# Patient Record
Sex: MALE | Race: WHITE | Employment: OTHER | URBAN - METROPOLITAN AREA
[De-identification: names, ages, dates, MRNs, and addresses within clinical notes are randomized per-mention and may not be internally consistent; named-entity substitution may affect disease eponyms.]

---

## 2017-02-16 ENCOUNTER — OFFICE VISIT (OUTPATIENT)
Dept: INTERNAL MEDICINE CLINIC | Age: 72
End: 2017-02-16

## 2017-02-16 VITALS
DIASTOLIC BLOOD PRESSURE: 70 MMHG | SYSTOLIC BLOOD PRESSURE: 124 MMHG | OXYGEN SATURATION: 98 % | HEART RATE: 70 BPM | BODY MASS INDEX: 42.45 KG/M2 | WEIGHT: 286.6 LBS | HEIGHT: 69 IN | TEMPERATURE: 97.7 F | RESPIRATION RATE: 16 BRPM

## 2017-02-16 DIAGNOSIS — H26.9 CATARACT: Primary | ICD-10-CM

## 2017-02-16 DIAGNOSIS — I48.0 PAROXYSMAL ATRIAL FIBRILLATION (HCC): ICD-10-CM

## 2017-02-16 DIAGNOSIS — Z01.818 PRE-OP EXAM: ICD-10-CM

## 2017-02-16 DIAGNOSIS — I10 ESSENTIAL HYPERTENSION, BENIGN: ICD-10-CM

## 2017-02-16 PROCEDURE — 99214 OFFICE O/P EST MOD 30 MIN: CPT | Performed by: NURSE PRACTITIONER

## 2017-02-16 ASSESSMENT — ENCOUNTER SYMPTOMS
ABDOMINAL PAIN: 0
NAUSEA: 0
BLOOD IN STOOL: 0
CONSTIPATION: 0
WHEEZING: 0
VOMITING: 0
COUGH: 0
DIARRHEA: 1
SHORTNESS OF BREATH: 0

## 2017-03-09 ENCOUNTER — TELEPHONE (OUTPATIENT)
Dept: INTERNAL MEDICINE CLINIC | Age: 72
End: 2017-03-09

## 2017-03-09 RX ORDER — AZITHROMYCIN 250 MG/1
TABLET, FILM COATED ORAL
Qty: 1 PACKET | Refills: 0 | OUTPATIENT
Start: 2017-03-09 | End: 2017-03-19

## 2017-03-09 RX ORDER — PROMETHAZINE HYDROCHLORIDE AND CODEINE PHOSPHATE 6.25; 1 MG/5ML; MG/5ML
5 SYRUP ORAL EVERY 4 HOURS PRN
Qty: 120 ML | Refills: 1 | OUTPATIENT
Start: 2017-03-09 | End: 2017-03-16

## 2017-03-15 ASSESSMENT — ENCOUNTER SYMPTOMS
SHORTNESS OF BREATH: 0
ABDOMINAL PAIN: 0

## 2017-03-21 ENCOUNTER — OFFICE VISIT (OUTPATIENT)
Dept: INTERNAL MEDICINE CLINIC | Age: 72
End: 2017-03-21

## 2017-03-21 VITALS
DIASTOLIC BLOOD PRESSURE: 70 MMHG | WEIGHT: 284 LBS | RESPIRATION RATE: 16 BRPM | HEIGHT: 69 IN | HEART RATE: 76 BPM | SYSTOLIC BLOOD PRESSURE: 110 MMHG | BODY MASS INDEX: 42.06 KG/M2

## 2017-03-21 DIAGNOSIS — N40.0 BENIGN NON-NODULAR PROSTATIC HYPERPLASIA WITHOUT LOWER URINARY TRACT SYMPTOMS: ICD-10-CM

## 2017-03-21 DIAGNOSIS — I48.0 PAROXYSMAL ATRIAL FIBRILLATION (HCC): ICD-10-CM

## 2017-03-21 DIAGNOSIS — I10 ESSENTIAL HYPERTENSION, BENIGN: Primary | ICD-10-CM

## 2017-03-21 DIAGNOSIS — E78.00 PURE HYPERCHOLESTEROLEMIA: ICD-10-CM

## 2017-03-21 DIAGNOSIS — Z23 NEED FOR PNEUMOCOCCAL VACCINATION: ICD-10-CM

## 2017-03-21 PROCEDURE — G0009 ADMIN PNEUMOCOCCAL VACCINE: HCPCS | Performed by: INTERNAL MEDICINE

## 2017-03-21 PROCEDURE — 99214 OFFICE O/P EST MOD 30 MIN: CPT | Performed by: INTERNAL MEDICINE

## 2017-03-21 PROCEDURE — 90670 PCV13 VACCINE IM: CPT | Performed by: INTERNAL MEDICINE

## 2017-06-16 ASSESSMENT — ENCOUNTER SYMPTOMS
ABDOMINAL PAIN: 0
SHORTNESS OF BREATH: 0

## 2017-06-19 ENCOUNTER — OFFICE VISIT (OUTPATIENT)
Dept: INTERNAL MEDICINE CLINIC | Age: 72
End: 2017-06-19

## 2017-06-19 VITALS
WEIGHT: 287 LBS | BODY MASS INDEX: 42.51 KG/M2 | HEART RATE: 84 BPM | SYSTOLIC BLOOD PRESSURE: 122 MMHG | RESPIRATION RATE: 16 BRPM | HEIGHT: 69 IN | DIASTOLIC BLOOD PRESSURE: 60 MMHG

## 2017-06-19 DIAGNOSIS — M79.609 PAIN IN EXTREMITY, UNSPECIFIED EXTREMITY: Primary | ICD-10-CM

## 2017-06-19 DIAGNOSIS — M12.9 ARTHROPATHY: ICD-10-CM

## 2017-06-19 DIAGNOSIS — I10 ESSENTIAL HYPERTENSION, BENIGN: ICD-10-CM

## 2017-06-19 PROCEDURE — 99214 OFFICE O/P EST MOD 30 MIN: CPT | Performed by: INTERNAL MEDICINE

## 2017-06-19 RX ORDER — TADALAFIL 5 MG/1
5 TABLET ORAL PRN
Qty: 30 TABLET | Refills: 0 | COMMUNITY
Start: 2017-06-19 | End: 2019-10-21

## 2017-06-19 RX ORDER — HYDROCODONE BITARTRATE AND ACETAMINOPHEN 5; 325 MG/1; MG/1
1 TABLET ORAL EVERY 6 HOURS PRN
Qty: 20 TABLET | Refills: 0 | Status: SHIPPED | OUTPATIENT
Start: 2017-06-19 | End: 2017-06-26

## 2017-06-19 RX ORDER — METHYLPREDNISOLONE 4 MG/1
TABLET ORAL
Qty: 1 KIT | Refills: 1 | Status: SHIPPED | OUTPATIENT
Start: 2017-06-19 | End: 2017-06-25

## 2017-06-19 RX ORDER — SILDENAFIL 100 MG/1
100 TABLET, FILM COATED ORAL PRN
Qty: 2 TABLET | Refills: 0 | COMMUNITY
Start: 2017-06-19 | End: 2019-10-29

## 2017-11-14 ENCOUNTER — NURSE ONLY (OUTPATIENT)
Dept: INTERNAL MEDICINE CLINIC | Age: 72
End: 2017-11-14

## 2017-11-14 DIAGNOSIS — Z23 NEED FOR INFLUENZA VACCINATION: Primary | ICD-10-CM

## 2017-11-14 PROCEDURE — 90662 IIV NO PRSV INCREASED AG IM: CPT | Performed by: INTERNAL MEDICINE

## 2017-11-14 PROCEDURE — G0008 ADMIN INFLUENZA VIRUS VAC: HCPCS | Performed by: INTERNAL MEDICINE

## 2017-12-29 ASSESSMENT — ENCOUNTER SYMPTOMS
SHORTNESS OF BREATH: 0
ABDOMINAL PAIN: 0

## 2018-01-05 ENCOUNTER — OFFICE VISIT (OUTPATIENT)
Dept: INTERNAL MEDICINE CLINIC | Age: 73
End: 2018-01-05

## 2018-01-05 VITALS
SYSTOLIC BLOOD PRESSURE: 136 MMHG | HEIGHT: 69 IN | WEIGHT: 296 LBS | BODY MASS INDEX: 43.84 KG/M2 | HEART RATE: 76 BPM | RESPIRATION RATE: 16 BRPM | DIASTOLIC BLOOD PRESSURE: 64 MMHG

## 2018-01-05 DIAGNOSIS — E78.00 PURE HYPERCHOLESTEROLEMIA: ICD-10-CM

## 2018-01-05 DIAGNOSIS — N39.43 BENIGN PROSTATIC HYPERPLASIA WITH POST-VOID DRIBBLING: ICD-10-CM

## 2018-01-05 DIAGNOSIS — R07.2 PRECORDIAL PAIN: ICD-10-CM

## 2018-01-05 DIAGNOSIS — R60.9 EDEMA, UNSPECIFIED TYPE: ICD-10-CM

## 2018-01-05 DIAGNOSIS — I10 ESSENTIAL HYPERTENSION, BENIGN: Primary | ICD-10-CM

## 2018-01-05 DIAGNOSIS — N40.1 BENIGN PROSTATIC HYPERPLASIA WITH POST-VOID DRIBBLING: ICD-10-CM

## 2018-01-05 DIAGNOSIS — I48.0 PAROXYSMAL ATRIAL FIBRILLATION (HCC): ICD-10-CM

## 2018-01-05 DIAGNOSIS — F32.5 MAJOR DEPRESSION, SINGLE EPISODE, IN COMPLETE REMISSION (HCC): ICD-10-CM

## 2018-01-05 DIAGNOSIS — E66.01 MORBID OBESITY WITH BMI OF 40.0-44.9, ADULT (HCC): ICD-10-CM

## 2018-01-05 LAB
A/G RATIO: 1.7 (ref 1.1–2.2)
ALBUMIN SERPL-MCNC: 4.3 G/DL (ref 3.4–5)
ALP BLD-CCNC: 79 U/L (ref 40–129)
ALT SERPL-CCNC: 17 U/L (ref 10–40)
ANION GAP SERPL CALCULATED.3IONS-SCNC: 15 MMOL/L (ref 3–16)
AST SERPL-CCNC: 18 U/L (ref 15–37)
BILIRUB SERPL-MCNC: 0.4 MG/DL (ref 0–1)
BUN BLDV-MCNC: 21 MG/DL (ref 7–20)
CALCIUM SERPL-MCNC: 8.9 MG/DL (ref 8.3–10.6)
CHLORIDE BLD-SCNC: 101 MMOL/L (ref 99–110)
CHOLESTEROL, TOTAL: 218 MG/DL (ref 0–199)
CO2: 25 MMOL/L (ref 21–32)
CREAT SERPL-MCNC: 0.7 MG/DL (ref 0.8–1.3)
GFR AFRICAN AMERICAN: >60
GFR NON-AFRICAN AMERICAN: >60
GLOBULIN: 2.5 G/DL
GLUCOSE BLD-MCNC: 94 MG/DL (ref 70–99)
HDLC SERPL-MCNC: 48 MG/DL (ref 40–60)
LDL CHOLESTEROL CALCULATED: 136 MG/DL
POTASSIUM SERPL-SCNC: 4.2 MMOL/L (ref 3.5–5.1)
PROSTATE SPECIFIC ANTIGEN: 0.3 NG/ML (ref 0–4)
SODIUM BLD-SCNC: 141 MMOL/L (ref 136–145)
TOTAL PROTEIN: 6.8 G/DL (ref 6.4–8.2)
TRIGL SERPL-MCNC: 172 MG/DL (ref 0–150)
VLDLC SERPL CALC-MCNC: 34 MG/DL

## 2018-01-05 PROCEDURE — 99214 OFFICE O/P EST MOD 30 MIN: CPT | Performed by: INTERNAL MEDICINE

## 2018-01-05 PROCEDURE — 93000 ELECTROCARDIOGRAM COMPLETE: CPT | Performed by: INTERNAL MEDICINE

## 2018-01-05 RX ORDER — TAMSULOSIN HYDROCHLORIDE 0.4 MG/1
0.4 CAPSULE ORAL DAILY
Qty: 30 CAPSULE | Refills: 5 | Status: SHIPPED | OUTPATIENT
Start: 2018-01-05 | End: 2018-03-28 | Stop reason: SDUPTHER

## 2018-01-05 ASSESSMENT — ENCOUNTER SYMPTOMS
RESPIRATORY NEGATIVE: 1
GASTROINTESTINAL NEGATIVE: 1
EYES NEGATIVE: 1

## 2018-01-05 NOTE — PROGRESS NOTES
Patient advised
exhibits edema. decr flex-ext rt wrist with assoc pain    Neurological: He is alert and oriented to person, place, and time. He has normal reflexes. Skin: Skin is warm and dry. Psychiatric: He has a normal mood and affect. Assessment:      1. Essential hypertension, benign     2. Pure hypercholesterolemia     3. Edema, unspecified type     4. Precordial pain --ck ekg--doubt cardiac--at rst--lack of assoc sx     5.  Benign prostatic hyperplasia with post-void dribbling --ck psa --add flomax     rto 6mos --ext ov         Plan:

## 2018-03-27 PROBLEM — M79.674 PAIN OF TOE OF RIGHT FOOT: Status: ACTIVE | Noted: 2018-03-27

## 2018-03-27 ASSESSMENT — ENCOUNTER SYMPTOMS
SHORTNESS OF BREATH: 0
ABDOMINAL PAIN: 0

## 2018-03-27 NOTE — PROGRESS NOTES
Subjective:      Patient ID: Xavi Billings is a 67 y.o. male. HPI  1. Essential hypertension, benign --Stable--no new issues      2. Pain of toe of right foot --acute --?? Gout ==gr toe--no trauma has lost toenail--years ago--     3. Pure hypercholesterolemia --Stable--no new issues----lab noted and reviewed      On flomax-- it works --will rf--     Review of Systems   Respiratory: Negative for shortness of breath. Cardiovascular: Negative for chest pain. Gastrointestinal: Negative for abdominal pain. Objective:   Physical Exam   Eyes: Pupils are equal, round, and reactive to light. Neck: Normal range of motion. Cardiovascular: Normal rate and normal heart sounds. Pulmonary/Chest: Effort normal.   Abdominal: Soft. Obese    Musculoskeletal: He exhibits edema. Rt gr toe--tender at tip--part of nail regrown--?? Irritated  MTP jt--min tender --not red    Neurological: He is alert. Skin: Skin is warm. Assessment:      1. Essential hypertension, benign --Continue current therapy      2. Pain of toe of right foot --?? Gout vs nail issue--ck uric acd--rx meaybe--if now--see pod to remove irritting nail  URIC ACID   3.  Pure hypercholesterolemia --Continue current therapy      BPH--rf flomax   rto 4mos --reg visit         Plan:

## 2018-03-28 ENCOUNTER — OFFICE VISIT (OUTPATIENT)
Dept: INTERNAL MEDICINE CLINIC | Age: 73
End: 2018-03-28

## 2018-03-28 VITALS
BODY MASS INDEX: 43.1 KG/M2 | DIASTOLIC BLOOD PRESSURE: 64 MMHG | SYSTOLIC BLOOD PRESSURE: 138 MMHG | RESPIRATION RATE: 16 BRPM | HEIGHT: 69 IN | WEIGHT: 291 LBS | HEART RATE: 88 BPM

## 2018-03-28 DIAGNOSIS — I10 ESSENTIAL HYPERTENSION, BENIGN: Primary | ICD-10-CM

## 2018-03-28 DIAGNOSIS — M79.674 PAIN OF TOE OF RIGHT FOOT: ICD-10-CM

## 2018-03-28 DIAGNOSIS — E78.00 PURE HYPERCHOLESTEROLEMIA: ICD-10-CM

## 2018-03-28 LAB — URIC ACID, SERUM: 8.9 MG/DL (ref 3.5–7.2)

## 2018-03-28 PROCEDURE — 99213 OFFICE O/P EST LOW 20 MIN: CPT | Performed by: INTERNAL MEDICINE

## 2018-03-28 RX ORDER — TAMSULOSIN HYDROCHLORIDE 0.4 MG/1
0.4 CAPSULE ORAL DAILY
Qty: 90 CAPSULE | Refills: 3 | Status: SHIPPED | OUTPATIENT
Start: 2018-03-28 | End: 2018-12-11

## 2018-03-29 DIAGNOSIS — M79.674 PAIN OF TOE OF RIGHT FOOT: Primary | ICD-10-CM

## 2018-03-29 RX ORDER — COLCHICINE 0.6 MG/1
0.6 TABLET ORAL 2 TIMES DAILY PRN
Qty: 30 TABLET | Refills: 0 | Status: SHIPPED | OUTPATIENT
Start: 2018-03-29 | End: 2019-10-21

## 2018-03-29 RX ORDER — ALLOPURINOL 100 MG/1
100 TABLET ORAL DAILY
Qty: 30 TABLET | Refills: 5 | Status: SHIPPED | OUTPATIENT
Start: 2018-03-29 | End: 2019-02-03 | Stop reason: SDUPTHER

## 2018-03-29 NOTE — TELEPHONE ENCOUNTER
When I try to sign RX's it comes up with drug warning between Colchicine and Lovastatin. If okay please sign?

## 2018-07-16 ASSESSMENT — ENCOUNTER SYMPTOMS
SHORTNESS OF BREATH: 0
ABDOMINAL PAIN: 0

## 2018-07-16 NOTE — PROGRESS NOTES
Subjective:      Patient ID: Juan Boudreaux is a 67 y.o. male. HPI   Diagnosis Orders   1. Essential hypertension, benign --Stable--no new issues      2. Pure hypercholesterolemia --Stable--no new issues----lab noted and reviewed      3. Primary localized osteoarthrosis of lower leg, unspecified laterality --Stable--no new issues      No med changes --go to [facility] 3 x a week   Skin cancer---dr pozo   Will ret for labs  Info on shgrx --    Uses flomax--has stopped--recc resume--     Review of Systems   Respiratory: Negative for shortness of breath. Cardiovascular: Negative for chest pain. Gastrointestinal: Negative for abdominal pain. Objective:   Physical Exam   HENT:   Head: Normocephalic. Eyes: Pupils are equal, round, and reactive to light. Neck: Normal range of motion. Cardiovascular: Normal rate and normal heart sounds. Pulmonary/Chest: Effort normal.   Abdominal: Soft. Musculoskeletal: He exhibits edema. decr rom l/s spine    Neurological: He is alert. Skin: Skin is warm. Assessment:       Diagnosis Orders   1. Essential hypertension, benign --Continue current therapy      2. Pure hypercholesterolemia --Continue current therapy      3.  Primary localized osteoarthrosis of lower leg, unspecified laterality --Continue current therapy      Ret for labs--  rto 6mos ext ov          Plan:

## 2018-07-18 ENCOUNTER — OFFICE VISIT (OUTPATIENT)
Dept: INTERNAL MEDICINE CLINIC | Age: 73
End: 2018-07-18

## 2018-07-18 VITALS
OXYGEN SATURATION: 97 % | HEART RATE: 64 BPM | RESPIRATION RATE: 16 BRPM | HEIGHT: 69 IN | WEIGHT: 295 LBS | BODY MASS INDEX: 43.69 KG/M2 | DIASTOLIC BLOOD PRESSURE: 78 MMHG | SYSTOLIC BLOOD PRESSURE: 118 MMHG | TEMPERATURE: 97.5 F

## 2018-07-18 DIAGNOSIS — I10 ESSENTIAL HYPERTENSION, BENIGN: Primary | ICD-10-CM

## 2018-07-18 DIAGNOSIS — E78.00 PURE HYPERCHOLESTEROLEMIA: ICD-10-CM

## 2018-07-18 DIAGNOSIS — M17.10 PRIMARY LOCALIZED OSTEOARTHROSIS OF LOWER LEG, UNSPECIFIED LATERALITY: ICD-10-CM

## 2018-07-18 DIAGNOSIS — N40.0 BENIGN PROSTATIC HYPERPLASIA WITHOUT LOWER URINARY TRACT SYMPTOMS: ICD-10-CM

## 2018-07-18 PROCEDURE — 3288F FALL RISK ASSESSMENT DOCD: CPT | Performed by: INTERNAL MEDICINE

## 2018-07-18 PROCEDURE — 99214 OFFICE O/P EST MOD 30 MIN: CPT | Performed by: INTERNAL MEDICINE

## 2018-08-30 ENCOUNTER — TELEPHONE (OUTPATIENT)
Dept: INTERNAL MEDICINE CLINIC | Age: 73
End: 2018-08-30

## 2018-08-31 DIAGNOSIS — M79.674 PAIN OF TOE OF RIGHT FOOT: ICD-10-CM

## 2018-08-31 LAB — URIC ACID, SERUM: 9.1 MG/DL (ref 3.5–7.2)

## 2018-09-04 ENCOUNTER — TELEPHONE (OUTPATIENT)
Dept: INTERNAL MEDICINE CLINIC | Age: 73
End: 2018-09-04

## 2018-09-04 RX ORDER — ALLOPURINOL 300 MG/1
300 TABLET ORAL DAILY
Qty: 30 TABLET | Refills: 3 | Status: SHIPPED | OUTPATIENT
Start: 2018-09-04 | End: 2018-10-02 | Stop reason: SDUPTHER

## 2019-02-04 RX ORDER — ALLOPURINOL 100 MG/1
TABLET ORAL
Qty: 90 TABLET | Refills: 4 | Status: SHIPPED | OUTPATIENT
Start: 2019-02-04 | End: 2020-02-27

## 2019-07-31 PROBLEM — J06.9 URTI (ACUTE UPPER RESPIRATORY INFECTION): Status: ACTIVE | Noted: 2019-07-31

## 2019-08-19 ENCOUNTER — OFFICE VISIT (OUTPATIENT)
Dept: SLEEP MEDICINE | Age: 74
End: 2019-08-19
Payer: MEDICARE

## 2019-08-19 VITALS
SYSTOLIC BLOOD PRESSURE: 130 MMHG | BODY MASS INDEX: 44.14 KG/M2 | RESPIRATION RATE: 16 BRPM | TEMPERATURE: 97.8 F | DIASTOLIC BLOOD PRESSURE: 84 MMHG | HEART RATE: 64 BPM | WEIGHT: 298 LBS | HEIGHT: 69 IN | OXYGEN SATURATION: 97 %

## 2019-08-19 DIAGNOSIS — I48.0 PAROXYSMAL ATRIAL FIBRILLATION (HCC): ICD-10-CM

## 2019-08-19 DIAGNOSIS — G47.33 OSA TREATED WITH BIPAP: Primary | ICD-10-CM

## 2019-08-19 DIAGNOSIS — Z99.89 DEPENDENCE ON OTHER ENABLING MACHINES AND DEVICES: ICD-10-CM

## 2019-08-19 DIAGNOSIS — I10 ESSENTIAL HYPERTENSION, BENIGN: ICD-10-CM

## 2019-08-19 PROCEDURE — 99203 OFFICE O/P NEW LOW 30 MIN: CPT | Performed by: PSYCHIATRY & NEUROLOGY

## 2019-08-19 ASSESSMENT — SLEEP AND FATIGUE QUESTIONNAIRES
HOW LIKELY ARE YOU TO NOD OFF OR FALL ASLEEP IN A CAR, WHILE STOPPED FOR A FEW MINUTES IN TRAFFIC: 0
NECK CIRCUMFERENCE (INCHES): 19
ESS TOTAL SCORE: 1
HOW LIKELY ARE YOU TO NOD OFF OR FALL ASLEEP WHILE WATCHING TV: 1
HOW LIKELY ARE YOU TO NOD OFF OR FALL ASLEEP WHILE SITTING AND TALKING TO SOMEONE: 0
HOW LIKELY ARE YOU TO NOD OFF OR FALL ASLEEP WHILE SITTING INACTIVE IN A PUBLIC PLACE: 0
HOW LIKELY ARE YOU TO NOD OFF OR FALL ASLEEP WHILE SITTING QUIETLY AFTER LUNCH WITHOUT ALCOHOL: 0
HOW LIKELY ARE YOU TO NOD OFF OR FALL ASLEEP WHILE SITTING AND READING: 0
HOW LIKELY ARE YOU TO NOD OFF OR FALL ASLEEP WHILE LYING DOWN TO REST IN THE AFTERNOON WHEN CIRCUMSTANCES PERMIT: 0
HOW LIKELY ARE YOU TO NOD OFF OR FALL ASLEEP WHEN YOU ARE A PASSENGER IN A CAR FOR AN HOUR WITHOUT A BREAK: 0

## 2019-08-19 ASSESSMENT — ENCOUNTER SYMPTOMS
GASTROINTESTINAL NEGATIVE: 1
CHOKING: 0
EYES NEGATIVE: 1
ALLERGIC/IMMUNOLOGIC NEGATIVE: 1
APNEA: 0

## 2019-08-19 NOTE — PROGRESS NOTES
MD ROSIE England Board Certified in Sleep Medicine  Certified [facility]  Board Certified in Neurology [address]  [address]  [address] [place] [address] [place] [address], [address]           [address]  [address] [facility] [id number] [phone]    Subjective:     Patient ID: Rox Toth is a 75 y.o. male. Chief Complaint   Patient presents with    Sleep Apnea     follow up        HPI:        Rox Toth is a 75 y.o. male referred by Dr Angel Luciano for RENETTA management. Patient  was diagnosed with moderate obstructive sleep apnea about 4 years ago, currently of PAP machine at 17/13 CMWP, last sleep study was 4 years ago, last PAP titration about 4 years ago. Patient is using the PAP machine  in total average of 5;37 hours a night, more than 4 hours a night about 73 % in the last 90 days data . This patient has not gained since last PAP titration. Uses comfort Blue gel mask, but does not seal very well, still has EDS since he could not use the BiPAP longer time. SLEEP SCHEDULE: Goes to bed around 11 PM in theweekdays and 11 PM in the weekends. It usually takes the patient 10-30 minutes to fall asleep. The patient gets up 2-3 per night to go to the bathroom. The Patient finally gets up at 6:30 AM during the weekdays and 6:30 AM in the weekends. patient wakes up with dry mouth. The Patient scored Total score: 1 on [place] Sleepiness Scale ( more than 10 is indicative of daytime sleepiness)and 35 in fatigue scale ( more than 36 is indicativeof daytime fatigue). The patient takes dailynap for 30-60 minutes and usually is not refreshing nap. Previous evaluation and treatment has included- PSG with C PAP titration.  BiPAP titration 11/09/2015 at 17/13 cm, I do not have the PSG report, but in the Arthropathy    Pain in joint, lower leg    Otalgia    Abdominal pain    Hemorrhage of rectum and anus    Essential and other specified forms of tremor    Pain in limb    Edema    Psychosexual dysfunction with inhibited sexual excitement    Pure hypercholesterolemia    Depressive disorder, not elsewhere classified    Obesity    Essential hypertension, benign    Osteoarthritis    Status post total knee replacement    Low back pain    Chest pain    Otalgia    Paroxysmal atrial fibrillation (HCC)    Pain of toe of right foot    URTI (acute upper respiratory infection)       Past Medical History:   Diagnosis Date    Arthritis     CAD (coronary artery disease)     CPAP (continuous positive airway pressure) dependence     High blood pressure     Hyperlipidemia     Sleep apnea        Past Surgical History:   Procedure Laterality Date    COLONOSCOPY  02/12/2014    sm sessile polyp  hemorrhoids--kassi 2019---dr mora     HERNIA REPAIR      JOINT REPLACEMENT Bilateral     knees    KNEE ARTHROSCOPY  10.1994    LUMBAR DISC SURGERY      LUMBAR LAMINECTOMY  10/28/2016    dr dalal==[facility]     TOTAL KNEE ARTHROPLASTY Right 8/23/11    TKR on right knee    TOTAL KNEE ARTHROPLASTY Left 2/21/12    Lt TKR---dr Cherelle Aguillon        Family History   Problem Relation Age of Onset    Heart Disease Other     High Blood Pressure Other        Review of Systems   Constitutional: Positive for fatigue. HENT: Negative. Eyes: Negative. Respiratory: Negative for apnea and choking. Cardiovascular: Negative for leg swelling. Gastrointestinal: Negative. Genitourinary: Positive for frequency (2-3 times). Musculoskeletal: Positive for arthralgias and myalgias. Skin: Negative. Allergic/Immunologic: Negative. Neurological: Negative. Negative for headaches. Hematological: Negative. Psychiatric/Behavioral: Negative. All other systems reviewed and are negative.       Objective:     Vitals:  Weight BMI

## 2019-10-21 PROBLEM — K62.5 RECTAL BLEEDING: Status: ACTIVE | Noted: 2019-10-21

## 2019-10-24 ENCOUNTER — ANESTHESIA EVENT (OUTPATIENT)
Dept: ENDOSCOPY | Age: 74
End: 2019-10-24
Payer: MEDICARE

## 2019-10-25 ENCOUNTER — HOSPITAL ENCOUNTER (OUTPATIENT)
Age: 74
Setting detail: OUTPATIENT SURGERY
Discharge: HOME | End: 2019-10-25
Attending: INTERNAL MEDICINE | Admitting: INTERNAL MEDICINE
Payer: MEDICARE

## 2019-10-25 ENCOUNTER — ANESTHESIA (OUTPATIENT)
Dept: ENDOSCOPY | Age: 74
End: 2019-10-25
Payer: MEDICARE

## 2019-10-25 VITALS
RESPIRATION RATE: 16 BRPM | TEMPERATURE: 97.1 F | HEART RATE: 59 BPM | SYSTOLIC BLOOD PRESSURE: 141 MMHG | OXYGEN SATURATION: 96 % | DIASTOLIC BLOOD PRESSURE: 61 MMHG

## 2019-10-25 VITALS
SYSTOLIC BLOOD PRESSURE: 111 MMHG | RESPIRATION RATE: 14 BRPM | DIASTOLIC BLOOD PRESSURE: 56 MMHG | OXYGEN SATURATION: 97 %

## 2019-10-25 DIAGNOSIS — K62.5 RECTAL BLEEDING: ICD-10-CM

## 2019-10-25 PROCEDURE — 6360000002 HC RX W HCPCS

## 2019-10-25 PROCEDURE — 2580000003 HC RX 258: Performed by: ANESTHESIOLOGY

## 2019-10-25 PROCEDURE — 7100000011 HC PHASE II RECOVERY - ADDTL 15 MIN: Performed by: INTERNAL MEDICINE

## 2019-10-25 PROCEDURE — 88305 TISSUE EXAM BY PATHOLOGIST: CPT

## 2019-10-25 PROCEDURE — 2500000003 HC RX 250 WO HCPCS

## 2019-10-25 PROCEDURE — 3700000000 HC ANESTHESIA ATTENDED CARE: Performed by: INTERNAL MEDICINE

## 2019-10-25 PROCEDURE — 3609010300 HC COLONOSCOPY W/BIOPSY SINGLE/MULTIPLE: Performed by: INTERNAL MEDICINE

## 2019-10-25 PROCEDURE — 3700000001 HC ADD 15 MINUTES (ANESTHESIA): Performed by: INTERNAL MEDICINE

## 2019-10-25 PROCEDURE — 7100000010 HC PHASE II RECOVERY - FIRST 15 MIN: Performed by: INTERNAL MEDICINE

## 2019-10-25 PROCEDURE — 2709999900 HC NON-CHARGEABLE SUPPLY: Performed by: INTERNAL MEDICINE

## 2019-10-25 RX ORDER — ONDANSETRON 2 MG/ML
4 INJECTION INTRAMUSCULAR; INTRAVENOUS
Status: DISCONTINUED | OUTPATIENT
Start: 2019-10-25 | End: 2019-10-25 | Stop reason: HOSPADM

## 2019-10-25 RX ORDER — SODIUM CHLORIDE 0.9 % (FLUSH) 0.9 %
10 SYRINGE (ML) INJECTION PRN
Status: DISCONTINUED | OUTPATIENT
Start: 2019-10-25 | End: 2019-10-25 | Stop reason: HOSPADM

## 2019-10-25 RX ORDER — PROPOFOL 10 MG/ML
INJECTION, EMULSION INTRAVENOUS PRN
Status: DISCONTINUED | OUTPATIENT
Start: 2019-10-25 | End: 2019-10-25 | Stop reason: SDUPTHER

## 2019-10-25 RX ORDER — SODIUM CHLORIDE 9 MG/ML
INJECTION, SOLUTION INTRAVENOUS CONTINUOUS
Status: DISCONTINUED | OUTPATIENT
Start: 2019-10-25 | End: 2019-10-25 | Stop reason: HOSPADM

## 2019-10-25 RX ORDER — LIDOCAINE HYDROCHLORIDE 20 MG/ML
INJECTION, SOLUTION EPIDURAL; INFILTRATION; INTRACAUDAL; PERINEURAL PRN
Status: DISCONTINUED | OUTPATIENT
Start: 2019-10-25 | End: 2019-10-25 | Stop reason: SDUPTHER

## 2019-10-25 RX ORDER — SODIUM CHLORIDE 0.9 % (FLUSH) 0.9 %
10 SYRINGE (ML) INJECTION EVERY 12 HOURS SCHEDULED
Status: DISCONTINUED | OUTPATIENT
Start: 2019-10-25 | End: 2019-10-25 | Stop reason: HOSPADM

## 2019-10-25 RX ADMIN — SODIUM CHLORIDE: 0.9 INJECTION, SOLUTION INTRAVENOUS at 10:29

## 2019-10-25 RX ADMIN — PROPOFOL 30 MG: 10 INJECTION, EMULSION INTRAVENOUS at 10:39

## 2019-10-25 RX ADMIN — PROPOFOL 20 MG: 10 INJECTION, EMULSION INTRAVENOUS at 10:37

## 2019-10-25 RX ADMIN — PROPOFOL 20 MG: 10 INJECTION, EMULSION INTRAVENOUS at 10:43

## 2019-10-25 RX ADMIN — LIDOCAINE HYDROCHLORIDE 60 MG: 20 INJECTION, SOLUTION EPIDURAL; INFILTRATION; INTRACAUDAL; PERINEURAL at 10:35

## 2019-10-25 RX ADMIN — PROPOFOL 80 MG: 10 INJECTION, EMULSION INTRAVENOUS at 10:35

## 2019-10-25 RX ADMIN — PROPOFOL 30 MG: 10 INJECTION, EMULSION INTRAVENOUS at 10:41

## 2019-10-25 RX ADMIN — PROPOFOL 10 MG: 10 INJECTION, EMULSION INTRAVENOUS at 10:47

## 2019-10-25 RX ADMIN — PROPOFOL 30 MG: 10 INJECTION, EMULSION INTRAVENOUS at 10:45

## 2019-10-25 ASSESSMENT — PAIN - FUNCTIONAL ASSESSMENT: PAIN_FUNCTIONAL_ASSESSMENT: 0-10

## 2019-10-25 ASSESSMENT — PAIN SCALES - GENERAL
PAINLEVEL_OUTOF10: 0

## 2019-10-29 ENCOUNTER — PREP FOR PROCEDURE (OUTPATIENT)
Dept: SURGERY | Age: 74
End: 2019-10-29

## 2019-10-29 ENCOUNTER — OFFICE VISIT (OUTPATIENT)
Dept: SURGERY | Age: 74
End: 2019-10-29
Payer: MEDICARE

## 2019-10-29 VITALS
BODY MASS INDEX: 43.55 KG/M2 | HEIGHT: 69 IN | SYSTOLIC BLOOD PRESSURE: 146 MMHG | HEART RATE: 79 BPM | DIASTOLIC BLOOD PRESSURE: 66 MMHG | WEIGHT: 294 LBS

## 2019-10-29 DIAGNOSIS — E66.01 CLASS 3 SEVERE OBESITY DUE TO EXCESS CALORIES WITHOUT SERIOUS COMORBIDITY WITH BODY MASS INDEX (BMI) OF 40.0 TO 44.9 IN ADULT (HCC): ICD-10-CM

## 2019-10-29 DIAGNOSIS — I48.0 PAROXYSMAL ATRIAL FIBRILLATION (HCC): ICD-10-CM

## 2019-10-29 DIAGNOSIS — C18.2 MALIGNANT NEOPLASM OF ASCENDING COLON (HCC): ICD-10-CM

## 2019-10-29 DIAGNOSIS — K62.5 RECTAL BLEEDING: ICD-10-CM

## 2019-10-29 DIAGNOSIS — C18.2 MALIGNANT NEOPLASM OF ASCENDING COLON (HCC): Primary | ICD-10-CM

## 2019-10-29 LAB — CEA: 25.6 NG/ML (ref 0–5)

## 2019-10-29 PROCEDURE — 99205 OFFICE O/P NEW HI 60 MIN: CPT | Performed by: SURGERY

## 2019-10-29 RX ORDER — SODIUM CHLORIDE 0.9 % (FLUSH) 0.9 %
10 SYRINGE (ML) INJECTION PRN
Status: CANCELLED | OUTPATIENT
Start: 2019-10-29

## 2019-10-29 RX ORDER — CIPROFLOXACIN 2 MG/ML
400 INJECTION, SOLUTION INTRAVENOUS
Status: CANCELLED | OUTPATIENT
Start: 2019-10-29 | End: 2019-10-29

## 2019-10-29 RX ORDER — SODIUM CHLORIDE 0.9 % (FLUSH) 0.9 %
10 SYRINGE (ML) INJECTION EVERY 12 HOURS SCHEDULED
Status: CANCELLED | OUTPATIENT
Start: 2019-10-29

## 2019-11-04 ENCOUNTER — HOSPITAL ENCOUNTER (OUTPATIENT)
Dept: CT IMAGING | Age: 74
Discharge: HOME | End: 2019-11-04
Payer: MEDICARE

## 2019-11-04 DIAGNOSIS — C18.2 MALIGNANT NEOPLASM OF ASCENDING COLON (HCC): ICD-10-CM

## 2019-11-04 PROCEDURE — 6360000004 HC RX CONTRAST MEDICATION: Performed by: SURGERY

## 2019-11-04 PROCEDURE — 71260 CT THORAX DX C+: CPT

## 2019-11-04 RX ADMIN — IOHEXOL 50 ML: 240 INJECTION, SOLUTION INTRATHECAL; INTRAVASCULAR; INTRAVENOUS; ORAL at 12:07

## 2019-11-04 RX ADMIN — IOPAMIDOL 80 ML: 755 INJECTION, SOLUTION INTRAVENOUS at 12:08

## 2019-11-06 ENCOUNTER — TELEPHONE (OUTPATIENT)
Dept: SURGERY | Age: 74
End: 2019-11-06

## 2019-11-15 ENCOUNTER — OFFICE VISIT (OUTPATIENT)
Dept: SURGERY | Age: 74
End: 2019-11-15
Payer: MEDICARE

## 2019-11-15 VITALS
DIASTOLIC BLOOD PRESSURE: 62 MMHG | SYSTOLIC BLOOD PRESSURE: 124 MMHG | WEIGHT: 294 LBS | TEMPERATURE: 98.1 F | OXYGEN SATURATION: 97 % | HEART RATE: 71 BPM | BODY MASS INDEX: 43.55 KG/M2 | HEIGHT: 69 IN

## 2019-11-15 DIAGNOSIS — R16.0 LIVER MASS: ICD-10-CM

## 2019-11-15 DIAGNOSIS — C18.2 MALIGNANT NEOPLASM OF ASCENDING COLON (HCC): Primary | ICD-10-CM

## 2019-11-15 PROCEDURE — 99205 OFFICE O/P NEW HI 60 MIN: CPT | Performed by: SURGERY

## 2019-11-22 ENCOUNTER — TELEPHONE (OUTPATIENT)
Dept: SURGERY | Age: 74
End: 2019-11-22

## 2019-12-02 ENCOUNTER — TELEPHONE (OUTPATIENT)
Dept: SURGERY | Age: 74
End: 2019-12-02

## 2019-12-02 DIAGNOSIS — K76.9 LIVER LESION: ICD-10-CM

## 2019-12-02 DIAGNOSIS — C18.2 MALIGNANT NEOPLASM OF ASCENDING COLON (HCC): Primary | ICD-10-CM

## 2019-12-02 RX ORDER — NEOMYCIN SULFATE 500 MG/1
TABLET ORAL
Qty: 6 TABLET | Refills: 0 | Status: ON HOLD | OUTPATIENT
Start: 2019-12-02 | End: 2019-12-15 | Stop reason: HOSPADM

## 2019-12-02 RX ORDER — METRONIDAZOLE 500 MG/1
TABLET ORAL
Qty: 3 TABLET | Refills: 0 | Status: ON HOLD | OUTPATIENT
Start: 2019-12-02 | End: 2019-12-15 | Stop reason: HOSPADM

## 2019-12-03 ENCOUNTER — TELEPHONE (OUTPATIENT)
Dept: SURGERY | Age: 74
End: 2019-12-03

## 2019-12-05 PROBLEM — C18.2 MALIGNANT NEOPLASM OF ASCENDING COLON (HCC): Status: ACTIVE | Noted: 2019-12-05

## 2019-12-06 ENCOUNTER — HOSPITAL ENCOUNTER (OUTPATIENT)
Dept: MRI IMAGING | Age: 74
Discharge: HOME | End: 2019-12-06
Payer: MEDICARE

## 2019-12-06 DIAGNOSIS — C18.2 MALIGNANT NEOPLASM OF ASCENDING COLON (HCC): ICD-10-CM

## 2019-12-06 DIAGNOSIS — K76.9 LIVER LESION: ICD-10-CM

## 2019-12-06 PROCEDURE — A9581 GADOXETATE DISODIUM INJ: HCPCS | Performed by: SURGERY

## 2019-12-06 PROCEDURE — 74183 MRI ABD W/O CNTR FLWD CNTR: CPT

## 2019-12-06 PROCEDURE — 6360000004 HC RX CONTRAST MEDICATION: Performed by: SURGERY

## 2019-12-06 RX ADMIN — GADOXETATE DISODIUM 10 ML: 181.43 INJECTION, SOLUTION INTRAVENOUS at 09:05

## 2019-12-12 ENCOUNTER — HOSPITAL ENCOUNTER (OUTPATIENT)
Dept: PREADMISSION TESTING | Age: 74
Discharge: HOME | DRG: 330 | End: 2019-12-16
Payer: MEDICARE

## 2019-12-12 ENCOUNTER — ANESTHESIA EVENT (OUTPATIENT)
Dept: OPERATING ROOM | Age: 74
DRG: 330 | End: 2019-12-12
Payer: MEDICARE

## 2019-12-12 VITALS
SYSTOLIC BLOOD PRESSURE: 141 MMHG | HEIGHT: 69 IN | WEIGHT: 288 LBS | DIASTOLIC BLOOD PRESSURE: 65 MMHG | HEART RATE: 65 BPM | RESPIRATION RATE: 16 BRPM | BODY MASS INDEX: 42.65 KG/M2 | TEMPERATURE: 97 F | OXYGEN SATURATION: 96 %

## 2019-12-12 LAB
ABO/RH: NORMAL
ANION GAP SERPL CALCULATED.3IONS-SCNC: 13 MMOL/L (ref 3–16)
ANTIBODY SCREEN: NORMAL
ANTIBODY SCREEN: NORMAL
APTT: 26.8 SEC (ref 24.2–36.2)
BUN BLDV-MCNC: 20 MG/DL (ref 7–20)
CALCIUM SERPL-MCNC: 9.2 MG/DL (ref 8.3–10.6)
CHLORIDE BLD-SCNC: 103 MMOL/L (ref 99–110)
CO2: 25 MMOL/L (ref 21–32)
CREAT SERPL-MCNC: 0.8 MG/DL (ref 0.8–1.3)
GFR AFRICAN AMERICAN: >60
GFR NON-AFRICAN AMERICAN: >60
GLUCOSE BLD-MCNC: 95 MG/DL (ref 70–99)
HCT VFR BLD CALC: 37.1 % (ref 40.5–52.5)
HEMOGLOBIN: 11.6 G/DL (ref 13.5–17.5)
INR BLD: 1.09 (ref 0.86–1.14)
MCH RBC QN AUTO: 22 PG (ref 26–34)
MCHC RBC AUTO-ENTMCNC: 31.3 G/DL (ref 31–36)
MCV RBC AUTO: 70.2 FL (ref 80–100)
PDW BLD-RTO: 18.8 % (ref 12.4–15.4)
PLATELET # BLD: 355 K/UL (ref 135–450)
PMV BLD AUTO: 7.3 FL (ref 5–10.5)
POTASSIUM REFLEX MAGNESIUM: 4.2 MMOL/L (ref 3.5–5.1)
PROTHROMBIN TIME: 12.6 SEC (ref 10–13.2)
RBC # BLD: 5.28 M/UL (ref 4.2–5.9)
SODIUM BLD-SCNC: 141 MMOL/L (ref 136–145)
WBC # BLD: 9 K/UL (ref 4–11)

## 2019-12-12 PROCEDURE — 85027 COMPLETE CBC AUTOMATED: CPT

## 2019-12-12 PROCEDURE — 86850 RBC ANTIBODY SCREEN: CPT

## 2019-12-12 PROCEDURE — 86900 BLOOD TYPING SEROLOGIC ABO: CPT

## 2019-12-12 PROCEDURE — 85730 THROMBOPLASTIN TIME PARTIAL: CPT

## 2019-12-12 PROCEDURE — 80048 BASIC METABOLIC PNL TOTAL CA: CPT

## 2019-12-12 PROCEDURE — 85610 PROTHROMBIN TIME: CPT

## 2019-12-12 PROCEDURE — 86901 BLOOD TYPING SEROLOGIC RH(D): CPT

## 2019-12-13 ENCOUNTER — ANESTHESIA (OUTPATIENT)
Dept: OPERATING ROOM | Age: 74
DRG: 330 | End: 2019-12-13
Payer: MEDICARE

## 2019-12-13 ENCOUNTER — HOSPITAL ENCOUNTER (INPATIENT)
Age: 74
LOS: 2 days | Discharge: HOME | DRG: 330 | End: 2019-12-15
Attending: SURGERY | Admitting: SURGERY
Payer: MEDICARE

## 2019-12-13 VITALS — SYSTOLIC BLOOD PRESSURE: 147 MMHG | OXYGEN SATURATION: 97 % | TEMPERATURE: 98.4 F | DIASTOLIC BLOOD PRESSURE: 67 MMHG

## 2019-12-13 DIAGNOSIS — C18.2 CANCER OF ASCENDING COLON (HCC): Primary | ICD-10-CM

## 2019-12-13 PROBLEM — C18.9 COLON CANCER (HCC): Status: ACTIVE | Noted: 2019-12-13

## 2019-12-13 LAB
ABO/RH: NORMAL
ANTIBODY SCREEN: NORMAL

## 2019-12-13 PROCEDURE — 6360000002 HC RX W HCPCS: Performed by: SURGERY

## 2019-12-13 PROCEDURE — 2580000003 HC RX 258: Performed by: ANESTHESIOLOGY

## 2019-12-13 PROCEDURE — 2700000000 HC OXYGEN THERAPY PER DAY

## 2019-12-13 PROCEDURE — 7100000001 HC PACU RECOVERY - ADDTL 15 MIN: Performed by: SURGERY

## 2019-12-13 PROCEDURE — 2720000010 HC SURG SUPPLY STERILE: Performed by: SURGERY

## 2019-12-13 PROCEDURE — 6360000002 HC RX W HCPCS: Performed by: NURSE ANESTHETIST, CERTIFIED REGISTERED

## 2019-12-13 PROCEDURE — S2900 ROBOTIC SURGICAL SYSTEM: HCPCS | Performed by: SURGERY

## 2019-12-13 PROCEDURE — 2500000003 HC RX 250 WO HCPCS: Performed by: SURGERY

## 2019-12-13 PROCEDURE — 86900 BLOOD TYPING SEROLOGIC ABO: CPT

## 2019-12-13 PROCEDURE — 2780000010 HC IMPLANT OTHER: Performed by: SURGERY

## 2019-12-13 PROCEDURE — 0FJ04ZZ INSPECTION OF LIVER, PERCUTANEOUS ENDOSCOPIC APPROACH: ICD-10-PCS | Performed by: SURGERY

## 2019-12-13 PROCEDURE — 2500000003 HC RX 250 WO HCPCS: Performed by: NURSE ANESTHETIST, CERTIFIED REGISTERED

## 2019-12-13 PROCEDURE — 94761 N-INVAS EAR/PLS OXIMETRY MLT: CPT

## 2019-12-13 PROCEDURE — 94150 VITAL CAPACITY TEST: CPT

## 2019-12-13 PROCEDURE — 3700000000 HC ANESTHESIA ATTENDED CARE: Performed by: SURGERY

## 2019-12-13 PROCEDURE — 49320 DIAG LAPARO SEPARATE PROC: CPT | Performed by: SURGERY

## 2019-12-13 PROCEDURE — 44204 LAPARO PARTIAL COLECTOMY: CPT | Performed by: SURGERY

## 2019-12-13 PROCEDURE — 3600000009 HC SURGERY ROBOT BASE: Performed by: SURGERY

## 2019-12-13 PROCEDURE — 2580000003 HC RX 258: Performed by: SURGERY

## 2019-12-13 PROCEDURE — 0DTF4ZZ RESECTION OF RIGHT LARGE INTESTINE, PERCUTANEOUS ENDOSCOPIC APPROACH: ICD-10-PCS | Performed by: SURGERY

## 2019-12-13 PROCEDURE — 88309 TISSUE EXAM BY PATHOLOGIST: CPT

## 2019-12-13 PROCEDURE — 3700000001 HC ADD 15 MINUTES (ANESTHESIA): Performed by: SURGERY

## 2019-12-13 PROCEDURE — 6360000002 HC RX W HCPCS: Performed by: ANESTHESIOLOGY

## 2019-12-13 PROCEDURE — 7100000000 HC PACU RECOVERY - FIRST 15 MIN: Performed by: SURGERY

## 2019-12-13 PROCEDURE — 86850 RBC ANTIBODY SCREEN: CPT

## 2019-12-13 PROCEDURE — 76998 US GUIDE INTRAOP: CPT | Performed by: SURGERY

## 2019-12-13 PROCEDURE — 2580000003 HC RX 258: Performed by: STUDENT IN AN ORGANIZED HEALTH CARE EDUCATION/TRAINING PROGRAM

## 2019-12-13 PROCEDURE — 6370000000 HC RX 637 (ALT 250 FOR IP): Performed by: STUDENT IN AN ORGANIZED HEALTH CARE EDUCATION/TRAINING PROGRAM

## 2019-12-13 PROCEDURE — 2709999900 HC NON-CHARGEABLE SUPPLY: Performed by: SURGERY

## 2019-12-13 PROCEDURE — 1200000000 HC SEMI PRIVATE

## 2019-12-13 PROCEDURE — 86901 BLOOD TYPING SEROLOGIC RH(D): CPT

## 2019-12-13 PROCEDURE — 3600000019 HC SURGERY ROBOT ADDTL 15MIN: Performed by: SURGERY

## 2019-12-13 PROCEDURE — 2580000003 HC RX 258: Performed by: NURSE ANESTHETIST, CERTIFIED REGISTERED

## 2019-12-13 RX ORDER — ONDANSETRON 2 MG/ML
INJECTION INTRAMUSCULAR; INTRAVENOUS PRN
Status: DISCONTINUED | OUTPATIENT
Start: 2019-12-13 | End: 2019-12-13 | Stop reason: SDUPTHER

## 2019-12-13 RX ORDER — SODIUM CHLORIDE 0.9 % (FLUSH) 0.9 %
10 SYRINGE (ML) INJECTION PRN
Status: DISCONTINUED | OUTPATIENT
Start: 2019-12-13 | End: 2019-12-13 | Stop reason: HOSPADM

## 2019-12-13 RX ORDER — IBUPROFEN 400 MG/1
400 TABLET ORAL EVERY 6 HOURS
Status: DISCONTINUED | OUTPATIENT
Start: 2019-12-13 | End: 2019-12-15 | Stop reason: HOSPADM

## 2019-12-13 RX ORDER — GLYCOPYRROLATE 0.2 MG/ML
INJECTION INTRAMUSCULAR; INTRAVENOUS PRN
Status: DISCONTINUED | OUTPATIENT
Start: 2019-12-13 | End: 2019-12-13 | Stop reason: SDUPTHER

## 2019-12-13 RX ORDER — SODIUM CHLORIDE 0.9 % (FLUSH) 0.9 %
10 SYRINGE (ML) INJECTION EVERY 12 HOURS SCHEDULED
Status: DISCONTINUED | OUTPATIENT
Start: 2019-12-13 | End: 2019-12-13 | Stop reason: SDUPTHER

## 2019-12-13 RX ORDER — ONDANSETRON 2 MG/ML
4 INJECTION INTRAMUSCULAR; INTRAVENOUS
Status: DISCONTINUED | OUTPATIENT
Start: 2019-12-13 | End: 2019-12-13 | Stop reason: HOSPADM

## 2019-12-13 RX ORDER — HYDROMORPHONE HCL 110MG/55ML
PATIENT CONTROLLED ANALGESIA SYRINGE INTRAVENOUS PRN
Status: DISCONTINUED | OUTPATIENT
Start: 2019-12-13 | End: 2019-12-13 | Stop reason: SDUPTHER

## 2019-12-13 RX ORDER — FENTANYL CITRATE 50 UG/ML
25 INJECTION, SOLUTION INTRAMUSCULAR; INTRAVENOUS EVERY 5 MIN PRN
Status: COMPLETED | OUTPATIENT
Start: 2019-12-13 | End: 2019-12-13

## 2019-12-13 RX ORDER — PROMETHAZINE HYDROCHLORIDE 25 MG/ML
6.25 INJECTION, SOLUTION INTRAMUSCULAR; INTRAVENOUS
Status: DISCONTINUED | OUTPATIENT
Start: 2019-12-13 | End: 2019-12-13 | Stop reason: HOSPADM

## 2019-12-13 RX ORDER — SUCCINYLCHOLINE/SOD CL,ISO/PF 200MG/10ML
SYRINGE (ML) INTRAVENOUS PRN
Status: DISCONTINUED | OUTPATIENT
Start: 2019-12-13 | End: 2019-12-13 | Stop reason: SDUPTHER

## 2019-12-13 RX ORDER — ONDANSETRON 2 MG/ML
4 INJECTION INTRAMUSCULAR; INTRAVENOUS EVERY 6 HOURS PRN
Status: DISCONTINUED | OUTPATIENT
Start: 2019-12-13 | End: 2019-12-15 | Stop reason: HOSPADM

## 2019-12-13 RX ORDER — LIDOCAINE HYDROCHLORIDE 20 MG/ML
INJECTION, SOLUTION INTRAVENOUS PRN
Status: DISCONTINUED | OUTPATIENT
Start: 2019-12-13 | End: 2019-12-13 | Stop reason: SDUPTHER

## 2019-12-13 RX ORDER — FENTANYL CITRATE 50 UG/ML
INJECTION, SOLUTION INTRAMUSCULAR; INTRAVENOUS PRN
Status: DISCONTINUED | OUTPATIENT
Start: 2019-12-13 | End: 2019-12-13 | Stop reason: SDUPTHER

## 2019-12-13 RX ORDER — OXYCODONE HYDROCHLORIDE 5 MG/1
5 TABLET ORAL EVERY 4 HOURS PRN
Status: DISCONTINUED | OUTPATIENT
Start: 2019-12-13 | End: 2019-12-15 | Stop reason: HOSPADM

## 2019-12-13 RX ORDER — MAGNESIUM HYDROXIDE 1200 MG/15ML
LIQUID ORAL CONTINUOUS PRN
Status: COMPLETED | OUTPATIENT
Start: 2019-12-13 | End: 2019-12-13

## 2019-12-13 RX ORDER — CIPROFLOXACIN 2 MG/ML
400 INJECTION, SOLUTION INTRAVENOUS
Status: COMPLETED | OUTPATIENT
Start: 2019-12-13 | End: 2019-12-13

## 2019-12-13 RX ORDER — SODIUM CHLORIDE 0.9 % (FLUSH) 0.9 %
10 SYRINGE (ML) INJECTION EVERY 12 HOURS SCHEDULED
Status: DISCONTINUED | OUTPATIENT
Start: 2019-12-13 | End: 2019-12-15 | Stop reason: HOSPADM

## 2019-12-13 RX ORDER — EPHEDRINE SULFATE 50 MG/ML
INJECTION, SOLUTION INTRAVENOUS PRN
Status: DISCONTINUED | OUTPATIENT
Start: 2019-12-13 | End: 2019-12-13 | Stop reason: SDUPTHER

## 2019-12-13 RX ORDER — SODIUM CHLORIDE, SODIUM LACTATE, POTASSIUM CHLORIDE, CALCIUM CHLORIDE 600; 310; 30; 20 MG/100ML; MG/100ML; MG/100ML; MG/100ML
INJECTION, SOLUTION INTRAVENOUS CONTINUOUS
Status: DISCONTINUED | OUTPATIENT
Start: 2019-12-13 | End: 2019-12-14

## 2019-12-13 RX ORDER — HYDRALAZINE HYDROCHLORIDE 20 MG/ML
5 INJECTION INTRAMUSCULAR; INTRAVENOUS EVERY 10 MIN PRN
Status: DISCONTINUED | OUTPATIENT
Start: 2019-12-13 | End: 2019-12-13 | Stop reason: HOSPADM

## 2019-12-13 RX ORDER — NEOSTIGMINE METHYLSULFATE 5 MG/5 ML
SYRINGE (ML) INTRAVENOUS PRN
Status: DISCONTINUED | OUTPATIENT
Start: 2019-12-13 | End: 2019-12-13 | Stop reason: SDUPTHER

## 2019-12-13 RX ORDER — OXYCODONE HYDROCHLORIDE AND ACETAMINOPHEN 5; 325 MG/1; MG/1
1 TABLET ORAL
Status: DISCONTINUED | OUTPATIENT
Start: 2019-12-13 | End: 2019-12-13 | Stop reason: HOSPADM

## 2019-12-13 RX ORDER — LABETALOL 20 MG/4 ML (5 MG/ML) INTRAVENOUS SYRINGE
5 EVERY 10 MIN PRN
Status: DISCONTINUED | OUTPATIENT
Start: 2019-12-13 | End: 2019-12-13 | Stop reason: HOSPADM

## 2019-12-13 RX ORDER — ACETAMINOPHEN 500 MG
1000 TABLET ORAL EVERY 6 HOURS
Status: DISCONTINUED | OUTPATIENT
Start: 2019-12-13 | End: 2019-12-15 | Stop reason: HOSPADM

## 2019-12-13 RX ORDER — PROPOFOL 10 MG/ML
INJECTION, EMULSION INTRAVENOUS PRN
Status: DISCONTINUED | OUTPATIENT
Start: 2019-12-13 | End: 2019-12-13 | Stop reason: SDUPTHER

## 2019-12-13 RX ORDER — SODIUM CHLORIDE 0.9 % (FLUSH) 0.9 %
10 SYRINGE (ML) INJECTION PRN
Status: DISCONTINUED | OUTPATIENT
Start: 2019-12-13 | End: 2019-12-15 | Stop reason: HOSPADM

## 2019-12-13 RX ORDER — SODIUM CHLORIDE 9 MG/ML
INJECTION, SOLUTION INTRAVENOUS CONTINUOUS
Status: DISCONTINUED | OUTPATIENT
Start: 2019-12-13 | End: 2019-12-13

## 2019-12-13 RX ORDER — SODIUM CHLORIDE, SODIUM LACTATE, POTASSIUM CHLORIDE, CALCIUM CHLORIDE 600; 310; 30; 20 MG/100ML; MG/100ML; MG/100ML; MG/100ML
INJECTION, SOLUTION INTRAVENOUS CONTINUOUS
Status: DISCONTINUED | OUTPATIENT
Start: 2019-12-13 | End: 2019-12-13

## 2019-12-13 RX ORDER — SODIUM CHLORIDE 0.9 % (FLUSH) 0.9 %
10 SYRINGE (ML) INJECTION EVERY 12 HOURS SCHEDULED
Status: DISCONTINUED | OUTPATIENT
Start: 2019-12-13 | End: 2019-12-13 | Stop reason: HOSPADM

## 2019-12-13 RX ORDER — FENTANYL CITRATE 50 UG/ML
50 INJECTION, SOLUTION INTRAMUSCULAR; INTRAVENOUS EVERY 5 MIN PRN
Status: DISCONTINUED | OUTPATIENT
Start: 2019-12-13 | End: 2019-12-13 | Stop reason: HOSPADM

## 2019-12-13 RX ORDER — SODIUM CHLORIDE 9 MG/ML
INJECTION, SOLUTION INTRAVENOUS CONTINUOUS PRN
Status: DISCONTINUED | OUTPATIENT
Start: 2019-12-13 | End: 2019-12-13 | Stop reason: SDUPTHER

## 2019-12-13 RX ORDER — SODIUM CHLORIDE 0.9 % (FLUSH) 0.9 %
10 SYRINGE (ML) INJECTION PRN
Status: DISCONTINUED | OUTPATIENT
Start: 2019-12-13 | End: 2019-12-13 | Stop reason: SDUPTHER

## 2019-12-13 RX ORDER — ROCURONIUM BROMIDE 10 MG/ML
INJECTION, SOLUTION INTRAVENOUS PRN
Status: DISCONTINUED | OUTPATIENT
Start: 2019-12-13 | End: 2019-12-13 | Stop reason: SDUPTHER

## 2019-12-13 RX ADMIN — FENTANYL CITRATE 25 MCG: 50 INJECTION, SOLUTION INTRAMUSCULAR; INTRAVENOUS at 19:25

## 2019-12-13 RX ADMIN — HYDROMORPHONE HYDROCHLORIDE 0.5 MG: 2 INJECTION, SOLUTION INTRAMUSCULAR; INTRAVENOUS; SUBCUTANEOUS at 17:31

## 2019-12-13 RX ADMIN — METRONIDAZOLE 500 MG: 500 SOLUTION INTRAVENOUS at 14:55

## 2019-12-13 RX ADMIN — CIPROFLOXACIN 400 MG: 2 INJECTION, SOLUTION INTRAVENOUS at 14:55

## 2019-12-13 RX ADMIN — ENOXAPARIN SODIUM 40 MG: 40 INJECTION SUBCUTANEOUS at 11:25

## 2019-12-13 RX ADMIN — ROCURONIUM BROMIDE 10 MG: 10 INJECTION, SOLUTION INTRAVENOUS at 16:30

## 2019-12-13 RX ADMIN — HYDROMORPHONE HYDROCHLORIDE 0.5 MG: 2 INJECTION, SOLUTION INTRAMUSCULAR; INTRAVENOUS; SUBCUTANEOUS at 18:44

## 2019-12-13 RX ADMIN — FENTANYL CITRATE 50 MCG: 50 INJECTION INTRAMUSCULAR; INTRAVENOUS at 14:48

## 2019-12-13 RX ADMIN — ROCURONIUM BROMIDE 5 MG: 10 INJECTION, SOLUTION INTRAVENOUS at 14:51

## 2019-12-13 RX ADMIN — LIDOCAINE HYDROCHLORIDE 100 MG: 20 INJECTION, SOLUTION INTRAVENOUS at 14:51

## 2019-12-13 RX ADMIN — FENTANYL CITRATE 25 MCG: 50 INJECTION, SOLUTION INTRAMUSCULAR; INTRAVENOUS at 19:16

## 2019-12-13 RX ADMIN — Medication 160 MG: at 14:51

## 2019-12-13 RX ADMIN — PROPOFOL 200 MG: 10 INJECTION, EMULSION INTRAVENOUS at 14:51

## 2019-12-13 RX ADMIN — HYDROMORPHONE HYDROCHLORIDE 0.5 MG: 2 INJECTION, SOLUTION INTRAMUSCULAR; INTRAVENOUS; SUBCUTANEOUS at 15:40

## 2019-12-13 RX ADMIN — ACETAMINOPHEN 1000 MG: 500 TABLET ORAL at 21:31

## 2019-12-13 RX ADMIN — FENTANYL CITRATE 25 MCG: 50 INJECTION, SOLUTION INTRAMUSCULAR; INTRAVENOUS at 19:30

## 2019-12-13 RX ADMIN — FENTANYL CITRATE 25 MCG: 50 INJECTION, SOLUTION INTRAMUSCULAR; INTRAVENOUS at 19:50

## 2019-12-13 RX ADMIN — SODIUM CHLORIDE, POTASSIUM CHLORIDE, SODIUM LACTATE AND CALCIUM CHLORIDE 850 ML: 600; 310; 30; 20 INJECTION, SOLUTION INTRAVENOUS at 19:18

## 2019-12-13 RX ADMIN — ROCURONIUM BROMIDE 45 MG: 10 INJECTION, SOLUTION INTRAVENOUS at 15:02

## 2019-12-13 RX ADMIN — GLYCOPYRROLATE 0.8 MG: 0.2 INJECTION INTRAMUSCULAR; INTRAVENOUS at 18:27

## 2019-12-13 RX ADMIN — SODIUM CHLORIDE, SODIUM LACTATE, POTASSIUM CHLORIDE, AND CALCIUM CHLORIDE: 600; 310; 30; 20 INJECTION, SOLUTION INTRAVENOUS at 11:25

## 2019-12-13 RX ADMIN — SODIUM CHLORIDE, SODIUM LACTATE, POTASSIUM CHLORIDE, AND CALCIUM CHLORIDE: 600; 310; 30; 20 INJECTION, SOLUTION INTRAVENOUS at 14:45

## 2019-12-13 RX ADMIN — EPHEDRINE SULFATE 5 MG: 50 INJECTION, SOLUTION INTRAMUSCULAR; INTRAVENOUS; SUBCUTANEOUS at 15:26

## 2019-12-13 RX ADMIN — ROCURONIUM BROMIDE 20 MG: 10 INJECTION, SOLUTION INTRAVENOUS at 17:31

## 2019-12-13 RX ADMIN — FENTANYL CITRATE 50 MCG: 50 INJECTION INTRAMUSCULAR; INTRAVENOUS at 15:16

## 2019-12-13 RX ADMIN — GLYCOPYRROLATE 0.2 MG: 0.2 INJECTION INTRAMUSCULAR; INTRAVENOUS at 15:14

## 2019-12-13 RX ADMIN — ONDANSETRON 4 MG: 2 INJECTION INTRAMUSCULAR; INTRAVENOUS at 14:51

## 2019-12-13 RX ADMIN — SODIUM CHLORIDE, SODIUM LACTATE, POTASSIUM CHLORIDE, AND CALCIUM CHLORIDE: 600; 310; 30; 20 INJECTION, SOLUTION INTRAVENOUS at 18:34

## 2019-12-13 RX ADMIN — Medication 5 MG: at 18:27

## 2019-12-13 RX ADMIN — SODIUM CHLORIDE: 9 INJECTION, SOLUTION INTRAVENOUS at 14:55

## 2019-12-13 ASSESSMENT — PULMONARY FUNCTION TESTS
PIF_VALUE: 32
PIF_VALUE: 32
PIF_VALUE: 24
PIF_VALUE: 32
PIF_VALUE: 32
PIF_VALUE: 23
PIF_VALUE: 31
PIF_VALUE: 32
PIF_VALUE: 32
PIF_VALUE: 18
PIF_VALUE: 32
PIF_VALUE: 26
PIF_VALUE: 26
PIF_VALUE: 32
PIF_VALUE: 23
PIF_VALUE: 29
PIF_VALUE: 23
PIF_VALUE: 32
PIF_VALUE: 25
PIF_VALUE: 32
PIF_VALUE: 26
PIF_VALUE: 9
PIF_VALUE: 26
PIF_VALUE: 32
PIF_VALUE: 32
PIF_VALUE: 26
PIF_VALUE: 32
PIF_VALUE: 23
PIF_VALUE: 32
PIF_VALUE: 25
PIF_VALUE: 1
PIF_VALUE: 1
PIF_VALUE: 32
PIF_VALUE: 9
PIF_VALUE: 32
PIF_VALUE: 26
PIF_VALUE: 3
PIF_VALUE: 32
PIF_VALUE: 32
PIF_VALUE: 26
PIF_VALUE: 32
PIF_VALUE: 0
PIF_VALUE: 23
PIF_VALUE: 32
PIF_VALUE: 24
PIF_VALUE: 32
PIF_VALUE: 27
PIF_VALUE: 32
PIF_VALUE: 32
PIF_VALUE: 26
PIF_VALUE: 1
PIF_VALUE: 32
PIF_VALUE: 32
PIF_VALUE: 30
PIF_VALUE: 32
PIF_VALUE: 23
PIF_VALUE: 25
PIF_VALUE: 32
PIF_VALUE: 10
PIF_VALUE: 32
PIF_VALUE: 0
PIF_VALUE: 32
PIF_VALUE: 32
PIF_VALUE: 18
PIF_VALUE: 1
PIF_VALUE: 32
PIF_VALUE: 25
PIF_VALUE: 25
PIF_VALUE: 1
PIF_VALUE: 32
PIF_VALUE: 23
PIF_VALUE: 32
PIF_VALUE: 27
PIF_VALUE: 32
PIF_VALUE: 26
PIF_VALUE: 32
PIF_VALUE: 32
PIF_VALUE: 26
PIF_VALUE: 23
PIF_VALUE: 32
PIF_VALUE: 32
PIF_VALUE: 26
PIF_VALUE: 23
PIF_VALUE: 32
PIF_VALUE: 7
PIF_VALUE: 32
PIF_VALUE: 26
PIF_VALUE: 26
PIF_VALUE: 32
PIF_VALUE: 26
PIF_VALUE: 26
PIF_VALUE: 32
PIF_VALUE: 23
PIF_VALUE: 32
PIF_VALUE: 30
PIF_VALUE: 32
PIF_VALUE: 32
PIF_VALUE: 26
PIF_VALUE: 29
PIF_VALUE: 32
PIF_VALUE: 26
PIF_VALUE: 32
PIF_VALUE: 25
PIF_VALUE: 32
PIF_VALUE: 30
PIF_VALUE: 29
PIF_VALUE: 23
PIF_VALUE: 0
PIF_VALUE: 32
PIF_VALUE: 26
PIF_VALUE: 31
PIF_VALUE: 31
PIF_VALUE: 26
PIF_VALUE: 26
PIF_VALUE: 32
PIF_VALUE: 23
PIF_VALUE: 32
PIF_VALUE: 23
PIF_VALUE: 26
PIF_VALUE: 23
PIF_VALUE: 8
PIF_VALUE: 26
PIF_VALUE: 32
PIF_VALUE: 32
PIF_VALUE: 19
PIF_VALUE: 7
PIF_VALUE: 26
PIF_VALUE: 32
PIF_VALUE: 26
PIF_VALUE: 26
PIF_VALUE: 23
PIF_VALUE: 34
PIF_VALUE: 32
PIF_VALUE: 31
PIF_VALUE: 26
PIF_VALUE: 27
PIF_VALUE: 35
PIF_VALUE: 32
PIF_VALUE: 18
PIF_VALUE: 32
PIF_VALUE: 23
PIF_VALUE: 28
PIF_VALUE: 26
PIF_VALUE: 32
PIF_VALUE: 26
PIF_VALUE: 27
PIF_VALUE: 19
PIF_VALUE: 1
PIF_VALUE: 26
PIF_VALUE: 28
PIF_VALUE: 32
PIF_VALUE: 32
PIF_VALUE: 25
PIF_VALUE: 23
PIF_VALUE: 32
PIF_VALUE: 28
PIF_VALUE: 32
PIF_VALUE: 31
PIF_VALUE: 32
PIF_VALUE: 32
PIF_VALUE: 23
PIF_VALUE: 26
PIF_VALUE: 9
PIF_VALUE: 26
PIF_VALUE: 31
PIF_VALUE: 32
PIF_VALUE: 26
PIF_VALUE: 32
PIF_VALUE: 32
PIF_VALUE: 23
PIF_VALUE: 23
PIF_VALUE: 32
PIF_VALUE: 26
PIF_VALUE: 32
PIF_VALUE: 29
PIF_VALUE: 18
PIF_VALUE: 26
PIF_VALUE: 14
PIF_VALUE: 26
PIF_VALUE: 32
PIF_VALUE: 1
PIF_VALUE: 32
PIF_VALUE: 25
PIF_VALUE: 32
PIF_VALUE: 29
PIF_VALUE: 26
PIF_VALUE: 26
PIF_VALUE: 30
PIF_VALUE: 32
PIF_VALUE: 26
PIF_VALUE: 26
PIF_VALUE: 32
PIF_VALUE: 23
PIF_VALUE: 23
PIF_VALUE: 32
PIF_VALUE: 32
PIF_VALUE: 23
PIF_VALUE: 32
PIF_VALUE: 25
PIF_VALUE: 32
PIF_VALUE: 30
PIF_VALUE: 15
PIF_VALUE: 32
PIF_VALUE: 35

## 2019-12-13 ASSESSMENT — PAIN SCALES - GENERAL
PAINLEVEL_OUTOF10: 0
PAINLEVEL_OUTOF10: 5
PAINLEVEL_OUTOF10: 5
PAINLEVEL_OUTOF10: 0
PAINLEVEL_OUTOF10: 6
PAINLEVEL_OUTOF10: 4
PAINLEVEL_OUTOF10: 5
PAINLEVEL_OUTOF10: 1
PAINLEVEL_OUTOF10: 5
PAINLEVEL_OUTOF10: 4
PAINLEVEL_OUTOF10: 0
PAINLEVEL_OUTOF10: 6

## 2019-12-13 ASSESSMENT — PAIN DESCRIPTION - FREQUENCY
FREQUENCY: CONTINUOUS

## 2019-12-13 ASSESSMENT — PAIN DESCRIPTION - LOCATION
LOCATION: ABDOMEN

## 2019-12-13 ASSESSMENT — PAIN DESCRIPTION - PAIN TYPE
TYPE: SURGICAL PAIN

## 2019-12-13 ASSESSMENT — PAIN - FUNCTIONAL ASSESSMENT: PAIN_FUNCTIONAL_ASSESSMENT: 0-10

## 2019-12-13 ASSESSMENT — PAIN DESCRIPTION - DESCRIPTORS
DESCRIPTORS: SORE
DESCRIPTORS: ACHING
DESCRIPTORS: SORE

## 2019-12-13 ASSESSMENT — PAIN DESCRIPTION - ORIENTATION: ORIENTATION: MID

## 2019-12-14 LAB
ANION GAP SERPL CALCULATED.3IONS-SCNC: 13 MMOL/L (ref 3–16)
BUN BLDV-MCNC: 20 MG/DL (ref 7–20)
CALCIUM SERPL-MCNC: 8.6 MG/DL (ref 8.3–10.6)
CHLORIDE BLD-SCNC: 104 MMOL/L (ref 99–110)
CO2: 23 MMOL/L (ref 21–32)
CREAT SERPL-MCNC: 0.9 MG/DL (ref 0.8–1.3)
GFR AFRICAN AMERICAN: >60
GFR NON-AFRICAN AMERICAN: >60
GLUCOSE BLD-MCNC: 116 MG/DL (ref 70–99)
HCT VFR BLD CALC: 32.7 % (ref 40.5–52.5)
HEMOGLOBIN: 10.1 G/DL (ref 13.5–17.5)
MAGNESIUM: 2.1 MG/DL (ref 1.8–2.4)
MCH RBC QN AUTO: 21.6 PG (ref 26–34)
MCHC RBC AUTO-ENTMCNC: 31 G/DL (ref 31–36)
MCV RBC AUTO: 69.7 FL (ref 80–100)
PDW BLD-RTO: 19.1 % (ref 12.4–15.4)
PHOSPHORUS: 3.2 MG/DL (ref 2.5–4.9)
PLATELET # BLD: 279 K/UL (ref 135–450)
PMV BLD AUTO: 7.2 FL (ref 5–10.5)
POTASSIUM REFLEX MAGNESIUM: 4.3 MMOL/L (ref 3.5–5.1)
RBC # BLD: 4.69 M/UL (ref 4.2–5.9)
SODIUM BLD-SCNC: 140 MMOL/L (ref 136–145)
WBC # BLD: 8.6 K/UL (ref 4–11)

## 2019-12-14 PROCEDURE — 94761 N-INVAS EAR/PLS OXIMETRY MLT: CPT

## 2019-12-14 PROCEDURE — 84100 ASSAY OF PHOSPHORUS: CPT

## 2019-12-14 PROCEDURE — 94660 CPAP INITIATION&MGMT: CPT

## 2019-12-14 PROCEDURE — 80048 BASIC METABOLIC PNL TOTAL CA: CPT

## 2019-12-14 PROCEDURE — 2700000000 HC OXYGEN THERAPY PER DAY

## 2019-12-14 PROCEDURE — 6370000000 HC RX 637 (ALT 250 FOR IP): Performed by: STUDENT IN AN ORGANIZED HEALTH CARE EDUCATION/TRAINING PROGRAM

## 2019-12-14 PROCEDURE — 83735 ASSAY OF MAGNESIUM: CPT

## 2019-12-14 PROCEDURE — 2580000003 HC RX 258: Performed by: STUDENT IN AN ORGANIZED HEALTH CARE EDUCATION/TRAINING PROGRAM

## 2019-12-14 PROCEDURE — 85027 COMPLETE CBC AUTOMATED: CPT

## 2019-12-14 PROCEDURE — 1200000000 HC SEMI PRIVATE

## 2019-12-14 RX ORDER — FLECAINIDE ACETATE 100 MG/1
100 TABLET ORAL DAILY
Status: DISCONTINUED | OUTPATIENT
Start: 2019-12-14 | End: 2019-12-15 | Stop reason: HOSPADM

## 2019-12-14 RX ORDER — AMLODIPINE BESYLATE 5 MG/1
5 TABLET ORAL DAILY
Status: DISCONTINUED | OUTPATIENT
Start: 2019-12-14 | End: 2019-12-15 | Stop reason: HOSPADM

## 2019-12-14 RX ORDER — FUROSEMIDE 20 MG/1
20 TABLET ORAL 2 TIMES DAILY
Status: DISCONTINUED | OUTPATIENT
Start: 2019-12-14 | End: 2019-12-15 | Stop reason: HOSPADM

## 2019-12-14 RX ADMIN — OXYCODONE HYDROCHLORIDE 5 MG: 5 TABLET ORAL at 11:54

## 2019-12-14 RX ADMIN — ACETAMINOPHEN 1000 MG: 500 TABLET ORAL at 09:21

## 2019-12-14 RX ADMIN — ACETAMINOPHEN 1000 MG: 500 TABLET ORAL at 15:15

## 2019-12-14 RX ADMIN — ACETAMINOPHEN 1000 MG: 500 TABLET ORAL at 03:50

## 2019-12-14 RX ADMIN — IBUPROFEN 400 MG: 400 TABLET ORAL at 02:16

## 2019-12-14 RX ADMIN — AMLODIPINE BESYLATE 5 MG: 5 TABLET ORAL at 15:14

## 2019-12-14 RX ADMIN — FUROSEMIDE 20 MG: 20 TABLET ORAL at 15:15

## 2019-12-14 RX ADMIN — IBUPROFEN 400 MG: 400 TABLET ORAL at 15:15

## 2019-12-14 RX ADMIN — FLECAINIDE ACETATE 100 MG: 100 TABLET ORAL at 15:14

## 2019-12-14 RX ADMIN — ACETAMINOPHEN 1000 MG: 500 TABLET ORAL at 18:39

## 2019-12-14 RX ADMIN — IBUPROFEN 400 MG: 400 TABLET ORAL at 09:21

## 2019-12-14 RX ADMIN — IBUPROFEN 400 MG: 400 TABLET ORAL at 18:39

## 2019-12-14 RX ADMIN — Medication 10 ML: at 09:22

## 2019-12-14 RX ADMIN — OXYCODONE HYDROCHLORIDE 5 MG: 5 TABLET ORAL at 22:12

## 2019-12-14 RX ADMIN — Medication 10 ML: at 22:13

## 2019-12-14 RX ADMIN — SODIUM CHLORIDE, POTASSIUM CHLORIDE, SODIUM LACTATE AND CALCIUM CHLORIDE: 600; 310; 30; 20 INJECTION, SOLUTION INTRAVENOUS at 02:16

## 2019-12-14 ASSESSMENT — PAIN DESCRIPTION - PAIN TYPE
TYPE: SURGICAL PAIN

## 2019-12-14 ASSESSMENT — PAIN DESCRIPTION - LOCATION
LOCATION: ABDOMEN

## 2019-12-14 ASSESSMENT — PAIN SCALES - GENERAL
PAINLEVEL_OUTOF10: 8
PAINLEVEL_OUTOF10: 2
PAINLEVEL_OUTOF10: 5
PAINLEVEL_OUTOF10: 4
PAINLEVEL_OUTOF10: 2
PAINLEVEL_OUTOF10: 4
PAINLEVEL_OUTOF10: 0
PAINLEVEL_OUTOF10: 2
PAINLEVEL_OUTOF10: 2
PAINLEVEL_OUTOF10: 5
PAINLEVEL_OUTOF10: 8
PAINLEVEL_OUTOF10: 4
PAINLEVEL_OUTOF10: 4

## 2019-12-14 ASSESSMENT — PAIN DESCRIPTION - ORIENTATION
ORIENTATION: MID

## 2019-12-14 ASSESSMENT — PAIN DESCRIPTION - FREQUENCY
FREQUENCY: INTERMITTENT

## 2019-12-14 ASSESSMENT — PAIN DESCRIPTION - DESCRIPTORS
DESCRIPTORS: ACHING

## 2019-12-14 ASSESSMENT — PAIN DESCRIPTION - ONSET
ONSET: GRADUAL
ONSET: GRADUAL

## 2019-12-15 VITALS
HEART RATE: 67 BPM | SYSTOLIC BLOOD PRESSURE: 100 MMHG | RESPIRATION RATE: 18 BRPM | HEIGHT: 69 IN | TEMPERATURE: 98.8 F | BODY MASS INDEX: 42.65 KG/M2 | WEIGHT: 288 LBS | DIASTOLIC BLOOD PRESSURE: 49 MMHG | OXYGEN SATURATION: 93 %

## 2019-12-15 LAB
ALBUMIN SERPL-MCNC: 3.2 G/DL (ref 3.4–5)
ANION GAP SERPL CALCULATED.3IONS-SCNC: 12 MMOL/L (ref 3–16)
BASOPHILS ABSOLUTE: 0.1 K/UL (ref 0–0.2)
BASOPHILS RELATIVE PERCENT: 0.6 %
BUN BLDV-MCNC: 15 MG/DL (ref 7–20)
CALCIUM SERPL-MCNC: 8.3 MG/DL (ref 8.3–10.6)
CHLORIDE BLD-SCNC: 106 MMOL/L (ref 99–110)
CO2: 22 MMOL/L (ref 21–32)
CREAT SERPL-MCNC: 0.8 MG/DL (ref 0.8–1.3)
EOSINOPHILS ABSOLUTE: 0.2 K/UL (ref 0–0.6)
EOSINOPHILS RELATIVE PERCENT: 1.8 %
GFR AFRICAN AMERICAN: >60
GFR NON-AFRICAN AMERICAN: >60
GLUCOSE BLD-MCNC: 115 MG/DL (ref 70–99)
HCT VFR BLD CALC: 31.8 % (ref 40.5–52.5)
HEMOGLOBIN: 9.5 G/DL (ref 13.5–17.5)
LYMPHOCYTES ABSOLUTE: 1.4 K/UL (ref 1–5.1)
LYMPHOCYTES RELATIVE PERCENT: 12.3 %
MAGNESIUM: 2.1 MG/DL (ref 1.8–2.4)
MCH RBC QN AUTO: 21.1 PG (ref 26–34)
MCHC RBC AUTO-ENTMCNC: 29.9 G/DL (ref 31–36)
MCV RBC AUTO: 70.7 FL (ref 80–100)
MONOCYTES ABSOLUTE: 1.2 K/UL (ref 0–1.3)
MONOCYTES RELATIVE PERCENT: 10 %
NEUTROPHILS ABSOLUTE: 8.8 K/UL (ref 1.7–7.7)
NEUTROPHILS RELATIVE PERCENT: 75.3 %
PDW BLD-RTO: 18.9 % (ref 12.4–15.4)
PHOSPHORUS: 2.5 MG/DL (ref 2.5–4.9)
PLATELET # BLD: 272 K/UL (ref 135–450)
PMV BLD AUTO: 7.4 FL (ref 5–10.5)
POTASSIUM SERPL-SCNC: 3.8 MMOL/L (ref 3.5–5.1)
RBC # BLD: 4.5 M/UL (ref 4.2–5.9)
SODIUM BLD-SCNC: 140 MMOL/L (ref 136–145)
WBC # BLD: 11.7 K/UL (ref 4–11)

## 2019-12-15 PROCEDURE — 2580000003 HC RX 258: Performed by: STUDENT IN AN ORGANIZED HEALTH CARE EDUCATION/TRAINING PROGRAM

## 2019-12-15 PROCEDURE — 6370000000 HC RX 637 (ALT 250 FOR IP): Performed by: STUDENT IN AN ORGANIZED HEALTH CARE EDUCATION/TRAINING PROGRAM

## 2019-12-15 PROCEDURE — 85025 COMPLETE CBC W/AUTO DIFF WBC: CPT

## 2019-12-15 PROCEDURE — 6370000000 HC RX 637 (ALT 250 FOR IP)

## 2019-12-15 PROCEDURE — 36415 COLL VENOUS BLD VENIPUNCTURE: CPT

## 2019-12-15 PROCEDURE — 83735 ASSAY OF MAGNESIUM: CPT

## 2019-12-15 PROCEDURE — 6360000002 HC RX W HCPCS: Performed by: STUDENT IN AN ORGANIZED HEALTH CARE EDUCATION/TRAINING PROGRAM

## 2019-12-15 PROCEDURE — 80069 RENAL FUNCTION PANEL: CPT

## 2019-12-15 RX ORDER — POTASSIUM CHLORIDE 20 MEQ/1
TABLET, EXTENDED RELEASE ORAL
Status: COMPLETED
Start: 2019-12-15 | End: 2019-12-15

## 2019-12-15 RX ORDER — DOCUSATE SODIUM 100 MG/1
100 CAPSULE, LIQUID FILLED ORAL 2 TIMES DAILY
Qty: 60 CAPSULE | Refills: 0 | Status: ON HOLD | OUTPATIENT
Start: 2019-12-15 | End: 2019-12-24

## 2019-12-15 RX ORDER — OXYCODONE HYDROCHLORIDE 5 MG/1
5 TABLET ORAL EVERY 6 HOURS PRN
Qty: 28 TABLET | Refills: 0 | Status: ON HOLD | OUTPATIENT
Start: 2019-12-15 | End: 2019-12-24 | Stop reason: HOSPADM

## 2019-12-15 RX ORDER — PSEUDOEPHEDRINE HCL 30 MG
100 TABLET ORAL 2 TIMES DAILY
Qty: 60 CAPSULE | Refills: 0 | Status: SHIPPED | OUTPATIENT
Start: 2019-12-15 | End: 2019-12-15 | Stop reason: HOSPADM

## 2019-12-15 RX ORDER — OXYCODONE HYDROCHLORIDE 5 MG/1
5 TABLET ORAL EVERY 6 HOURS PRN
Qty: 28 TABLET | Refills: 0 | Status: SHIPPED | OUTPATIENT
Start: 2019-12-15 | End: 2019-12-15 | Stop reason: HOSPADM

## 2019-12-15 RX ORDER — DOCUSATE SODIUM 100 MG/1
100 CAPSULE, LIQUID FILLED ORAL 2 TIMES DAILY
Status: DISCONTINUED | OUTPATIENT
Start: 2019-12-15 | End: 2019-12-15 | Stop reason: HOSPADM

## 2019-12-15 RX ORDER — POTASSIUM CHLORIDE 20 MEQ/1
20 TABLET, EXTENDED RELEASE ORAL ONCE
Status: COMPLETED | OUTPATIENT
Start: 2019-12-15 | End: 2019-12-15

## 2019-12-15 RX ORDER — DOCUSATE SODIUM 100 MG/1
CAPSULE, LIQUID FILLED ORAL
Status: COMPLETED
Start: 2019-12-15 | End: 2019-12-15

## 2019-12-15 RX ADMIN — AMLODIPINE BESYLATE 5 MG: 5 TABLET ORAL at 08:15

## 2019-12-15 RX ADMIN — IBUPROFEN 400 MG: 400 TABLET ORAL at 13:32

## 2019-12-15 RX ADMIN — ACETAMINOPHEN 1000 MG: 500 TABLET ORAL at 13:32

## 2019-12-15 RX ADMIN — POTASSIUM CHLORIDE: 20 TABLET, EXTENDED RELEASE ORAL at 10:51

## 2019-12-15 RX ADMIN — POTASSIUM CHLORIDE 20 MEQ: 20 TABLET, EXTENDED RELEASE ORAL at 10:53

## 2019-12-15 RX ADMIN — FLECAINIDE ACETATE 100 MG: 100 TABLET ORAL at 08:00

## 2019-12-15 RX ADMIN — Medication 10 ML: at 10:30

## 2019-12-15 RX ADMIN — DOCUSATE SODIUM 100 MG: 100 CAPSULE, LIQUID FILLED ORAL at 10:51

## 2019-12-15 RX ADMIN — DOCUSATE SODIUM: 100 CAPSULE, LIQUID FILLED ORAL at 10:52

## 2019-12-15 RX ADMIN — ENOXAPARIN SODIUM 40 MG: 40 INJECTION SUBCUTANEOUS at 08:01

## 2019-12-15 RX ADMIN — IBUPROFEN 400 MG: 400 TABLET ORAL at 10:53

## 2019-12-15 RX ADMIN — ACETAMINOPHEN 1000 MG: 500 TABLET ORAL at 08:01

## 2019-12-15 RX ADMIN — DIBASIC SODIUM PHOSPHATE, MONOBASIC POTASSIUM PHOSPHATE AND MONOBASIC SODIUM PHOSPHATE 2 TABLET: 852; 155; 130 TABLET ORAL at 10:51

## 2019-12-15 RX ADMIN — FUROSEMIDE 20 MG: 20 TABLET ORAL at 08:01

## 2019-12-15 ASSESSMENT — PAIN DESCRIPTION - PAIN TYPE: TYPE: SURGICAL PAIN

## 2019-12-15 ASSESSMENT — PAIN SCALES - GENERAL
PAINLEVEL_OUTOF10: 5
PAINLEVEL_OUTOF10: 2
PAINLEVEL_OUTOF10: 5
PAINLEVEL_OUTOF10: 1
PAINLEVEL_OUTOF10: 5

## 2019-12-15 ASSESSMENT — PAIN DESCRIPTION - ONSET: ONSET: GRADUAL

## 2019-12-15 ASSESSMENT — PAIN DESCRIPTION - LOCATION: LOCATION: ABDOMEN

## 2019-12-15 ASSESSMENT — PAIN DESCRIPTION - FREQUENCY: FREQUENCY: INTERMITTENT

## 2019-12-15 ASSESSMENT — PAIN DESCRIPTION - ORIENTATION: ORIENTATION: MID

## 2019-12-15 ASSESSMENT — PAIN SCALES - WONG BAKER
WONGBAKER_NUMERICALRESPONSE: 0

## 2019-12-15 ASSESSMENT — PAIN DESCRIPTION - DESCRIPTORS: DESCRIPTORS: ACHING

## 2019-12-16 ENCOUNTER — TELEPHONE (OUTPATIENT)
Dept: PHARMACY | Facility: CLINIC | Age: 74
End: 2019-12-16

## 2019-12-16 DIAGNOSIS — C18.2 CANCER OF ASCENDING COLON (HCC): Primary | ICD-10-CM

## 2019-12-16 PROCEDURE — 1111F DSCHRG MED/CURRENT MED MERGE: CPT | Performed by: PHARMACIST

## 2019-12-18 ENCOUNTER — TELEPHONE (OUTPATIENT)
Dept: SURGERY | Age: 74
End: 2019-12-18

## 2019-12-18 DIAGNOSIS — C18.2 MALIGNANT NEOPLASM OF ASCENDING COLON (HCC): Primary | ICD-10-CM

## 2019-12-18 NOTE — TELEPHONE ENCOUNTER
Patient is returning Dr Charmain Babinski call regarding results of surgery. Please advise. Thank you!

## 2019-12-20 ENCOUNTER — APPOINTMENT (OUTPATIENT)
Dept: GENERAL RADIOLOGY | Age: 74
DRG: 919 | End: 2019-12-20
Payer: MEDICARE

## 2019-12-20 ENCOUNTER — TELEPHONE (OUTPATIENT)
Dept: SURGERY | Age: 74
End: 2019-12-20

## 2019-12-20 ENCOUNTER — HOSPITAL ENCOUNTER (INPATIENT)
Age: 74
LOS: 4 days | Discharge: HOME | DRG: 919 | End: 2019-12-24
Attending: EMERGENCY MEDICINE | Admitting: SURGERY
Payer: MEDICARE

## 2019-12-20 ENCOUNTER — APPOINTMENT (OUTPATIENT)
Dept: CT IMAGING | Age: 74
DRG: 919 | End: 2019-12-20
Payer: MEDICARE

## 2019-12-20 DIAGNOSIS — C18.2 CANCER OF ASCENDING COLON (HCC): ICD-10-CM

## 2019-12-20 DIAGNOSIS — T81.43XA INTRA-ABDOMINAL ABSCESS POST-PROCEDURE: Primary | ICD-10-CM

## 2019-12-20 PROBLEM — Z90.49 S/P PARTIAL COLECTOMY: Status: ACTIVE | Noted: 2019-12-20

## 2019-12-20 PROBLEM — A41.9 SEPSIS (HCC): Status: ACTIVE | Noted: 2019-12-20

## 2019-12-20 LAB
A/G RATIO: 0.9 (ref 1.1–2.2)
ALBUMIN SERPL-MCNC: 3.3 G/DL (ref 3.4–5)
ALP BLD-CCNC: 60 U/L (ref 40–129)
ALT SERPL-CCNC: 9 U/L (ref 10–40)
ANION GAP SERPL CALCULATED.3IONS-SCNC: 16 MMOL/L (ref 3–16)
AST SERPL-CCNC: 14 U/L (ref 15–37)
BASOPHILS ABSOLUTE: 0 K/UL (ref 0–0.2)
BASOPHILS RELATIVE PERCENT: 0.4 %
BILIRUB SERPL-MCNC: 0.4 MG/DL (ref 0–1)
BILIRUBIN URINE: ABNORMAL
BLOOD, URINE: NEGATIVE
BUN BLDV-MCNC: 13 MG/DL (ref 7–20)
CALCIUM SERPL-MCNC: 8.7 MG/DL (ref 8.3–10.6)
CHLORIDE BLD-SCNC: 102 MMOL/L (ref 99–110)
CLARITY: CLEAR
CO2: 23 MMOL/L (ref 21–32)
COLOR: ABNORMAL
CREAT SERPL-MCNC: 0.8 MG/DL (ref 0.8–1.3)
EOSINOPHILS ABSOLUTE: 0.1 K/UL (ref 0–0.6)
EOSINOPHILS RELATIVE PERCENT: 0.5 %
EPITHELIAL CELLS, UA: 3 /HPF (ref 0–5)
GFR AFRICAN AMERICAN: >60
GFR NON-AFRICAN AMERICAN: >60
GLOBULIN: 3.7 G/DL
GLUCOSE BLD-MCNC: 107 MG/DL (ref 70–99)
GLUCOSE URINE: NEGATIVE MG/DL
HCT VFR BLD CALC: 31.3 % (ref 40.5–52.5)
HEMATOLOGY PATH CONSULT: YES
HEMOGLOBIN: 9.9 G/DL (ref 13.5–17.5)
KETONES, URINE: ABNORMAL MG/DL
LACTIC ACID, SEPSIS: 1 MMOL/L (ref 0.4–1.9)
LEUKOCYTE ESTERASE, URINE: ABNORMAL
LIPASE: 43 U/L (ref 13–60)
LYMPHOCYTES ABSOLUTE: 1.4 K/UL (ref 1–5.1)
LYMPHOCYTES RELATIVE PERCENT: 11.2 %
MCH RBC QN AUTO: 21.3 PG (ref 26–34)
MCHC RBC AUTO-ENTMCNC: 31.5 G/DL (ref 31–36)
MCV RBC AUTO: 67.6 FL (ref 80–100)
MICROSCOPIC EXAMINATION: YES
MONOCYTES ABSOLUTE: 0.9 K/UL (ref 0–1.3)
MONOCYTES RELATIVE PERCENT: 7.6 %
MUCUS: ABNORMAL /LPF
NEUTROPHILS ABSOLUTE: 9.7 K/UL (ref 1.7–7.7)
NEUTROPHILS RELATIVE PERCENT: 80.3 %
NITRITE, URINE: NEGATIVE
PDW BLD-RTO: 18.9 % (ref 12.4–15.4)
PH UA: 6.5 (ref 5–8)
PLATELET # BLD: 390 K/UL (ref 135–450)
PMV BLD AUTO: 7.4 FL (ref 5–10.5)
POTASSIUM REFLEX MAGNESIUM: 3.9 MMOL/L (ref 3.5–5.1)
PROTEIN UA: 30 MG/DL
RBC # BLD: 4.63 M/UL (ref 4.2–5.9)
RBC UA: 3 /HPF (ref 0–4)
SODIUM BLD-SCNC: 141 MMOL/L (ref 136–145)
SPECIFIC GRAVITY UA: >1.03 (ref 1–1.03)
TOTAL PROTEIN: 7 G/DL (ref 6.4–8.2)
URINE REFLEX TO CULTURE: YES
URINE TYPE: ABNORMAL
UROBILINOGEN, URINE: 1 E.U./DL
WBC # BLD: 12.1 K/UL (ref 4–11)
WBC UA: 5 /HPF (ref 0–5)

## 2019-12-20 PROCEDURE — 6370000000 HC RX 637 (ALT 250 FOR IP): Performed by: SURGERY

## 2019-12-20 PROCEDURE — 83690 ASSAY OF LIPASE: CPT

## 2019-12-20 PROCEDURE — 96361 HYDRATE IV INFUSION ADD-ON: CPT

## 2019-12-20 PROCEDURE — 2580000003 HC RX 258: Performed by: EMERGENCY MEDICINE

## 2019-12-20 PROCEDURE — 1200000000 HC SEMI PRIVATE

## 2019-12-20 PROCEDURE — 96365 THER/PROPH/DIAG IV INF INIT: CPT

## 2019-12-20 PROCEDURE — 74177 CT ABD & PELVIS W/CONTRAST: CPT

## 2019-12-20 PROCEDURE — 6360000004 HC RX CONTRAST MEDICATION: Performed by: EMERGENCY MEDICINE

## 2019-12-20 PROCEDURE — 87086 URINE CULTURE/COLONY COUNT: CPT

## 2019-12-20 PROCEDURE — 6370000000 HC RX 637 (ALT 250 FOR IP): Performed by: EMERGENCY MEDICINE

## 2019-12-20 PROCEDURE — 2580000003 HC RX 258: Performed by: SURGERY

## 2019-12-20 PROCEDURE — 6360000002 HC RX W HCPCS: Performed by: SURGERY

## 2019-12-20 PROCEDURE — 2500000003 HC RX 250 WO HCPCS: Performed by: SURGERY

## 2019-12-20 PROCEDURE — 96376 TX/PRO/DX INJ SAME DRUG ADON: CPT

## 2019-12-20 PROCEDURE — 87040 BLOOD CULTURE FOR BACTERIA: CPT

## 2019-12-20 PROCEDURE — 85025 COMPLETE CBC W/AUTO DIFF WBC: CPT

## 2019-12-20 PROCEDURE — 99285 EMERGENCY DEPT VISIT HI MDM: CPT

## 2019-12-20 PROCEDURE — 6360000002 HC RX W HCPCS: Performed by: EMERGENCY MEDICINE

## 2019-12-20 PROCEDURE — 80053 COMPREHEN METABOLIC PANEL: CPT

## 2019-12-20 PROCEDURE — 96375 TX/PRO/DX INJ NEW DRUG ADDON: CPT

## 2019-12-20 PROCEDURE — 81001 URINALYSIS AUTO W/SCOPE: CPT

## 2019-12-20 PROCEDURE — 71046 X-RAY EXAM CHEST 2 VIEWS: CPT

## 2019-12-20 PROCEDURE — 83605 ASSAY OF LACTIC ACID: CPT

## 2019-12-20 RX ORDER — DOCUSATE SODIUM 100 MG/1
100 CAPSULE, LIQUID FILLED ORAL 2 TIMES DAILY
Status: DISCONTINUED | OUTPATIENT
Start: 2019-12-20 | End: 2019-12-24 | Stop reason: HOSPADM

## 2019-12-20 RX ORDER — SODIUM CHLORIDE 9 MG/ML
INJECTION, SOLUTION INTRAVENOUS CONTINUOUS
Status: DISCONTINUED | OUTPATIENT
Start: 2019-12-20 | End: 2019-12-24 | Stop reason: HOSPADM

## 2019-12-20 RX ORDER — OXYCODONE HYDROCHLORIDE 5 MG/1
5 TABLET ORAL EVERY 4 HOURS PRN
Status: DISCONTINUED | OUTPATIENT
Start: 2019-12-20 | End: 2019-12-24 | Stop reason: HOSPADM

## 2019-12-20 RX ORDER — AMLODIPINE BESYLATE 5 MG/1
5 TABLET ORAL DAILY
Status: DISCONTINUED | OUTPATIENT
Start: 2019-12-20 | End: 2019-12-21

## 2019-12-20 RX ORDER — 0.9 % SODIUM CHLORIDE 0.9 %
1000 INTRAVENOUS SOLUTION INTRAVENOUS ONCE
Status: COMPLETED | OUTPATIENT
Start: 2019-12-20 | End: 2019-12-20

## 2019-12-20 RX ORDER — ACETAMINOPHEN 500 MG
1000 TABLET ORAL EVERY 6 HOURS PRN
Status: ON HOLD | COMMUNITY
Start: 2019-12-18 | End: 2019-12-24

## 2019-12-20 RX ORDER — ONDANSETRON 2 MG/ML
4 INJECTION INTRAMUSCULAR; INTRAVENOUS EVERY 6 HOURS PRN
Status: DISCONTINUED | OUTPATIENT
Start: 2019-12-20 | End: 2019-12-24 | Stop reason: HOSPADM

## 2019-12-20 RX ORDER — FUROSEMIDE 20 MG/1
20 TABLET ORAL 2 TIMES DAILY
Status: DISCONTINUED | OUTPATIENT
Start: 2019-12-20 | End: 2019-12-21

## 2019-12-20 RX ORDER — FLECAINIDE ACETATE 100 MG/1
100 TABLET ORAL 2 TIMES DAILY
Status: DISCONTINUED | OUTPATIENT
Start: 2019-12-20 | End: 2019-12-24 | Stop reason: HOSPADM

## 2019-12-20 RX ORDER — SODIUM CHLORIDE 0.9 % (FLUSH) 0.9 %
10 SYRINGE (ML) INJECTION PRN
Status: DISCONTINUED | OUTPATIENT
Start: 2019-12-20 | End: 2019-12-24 | Stop reason: HOSPADM

## 2019-12-20 RX ORDER — ALLOPURINOL 100 MG/1
100 TABLET ORAL DAILY
Status: DISCONTINUED | OUTPATIENT
Start: 2019-12-20 | End: 2019-12-24 | Stop reason: HOSPADM

## 2019-12-20 RX ORDER — SODIUM CHLORIDE 0.9 % (FLUSH) 0.9 %
10 SYRINGE (ML) INJECTION EVERY 12 HOURS SCHEDULED
Status: DISCONTINUED | OUTPATIENT
Start: 2019-12-20 | End: 2019-12-24 | Stop reason: HOSPADM

## 2019-12-20 RX ORDER — OXYCODONE HYDROCHLORIDE 5 MG/1
10 TABLET ORAL EVERY 4 HOURS PRN
Status: DISCONTINUED | OUTPATIENT
Start: 2019-12-20 | End: 2019-12-24 | Stop reason: HOSPADM

## 2019-12-20 RX ORDER — ONDANSETRON 2 MG/ML
4 INJECTION INTRAMUSCULAR; INTRAVENOUS
Status: DISCONTINUED | OUTPATIENT
Start: 2019-12-20 | End: 2019-12-20

## 2019-12-20 RX ORDER — LISINOPRIL 10 MG/1
40 TABLET ORAL DAILY
Status: DISCONTINUED | OUTPATIENT
Start: 2019-12-20 | End: 2019-12-21

## 2019-12-20 RX ORDER — MORPHINE SULFATE 4 MG/ML
4 INJECTION, SOLUTION INTRAMUSCULAR; INTRAVENOUS
Status: COMPLETED | OUTPATIENT
Start: 2019-12-20 | End: 2019-12-20

## 2019-12-20 RX ORDER — CIPROFLOXACIN 2 MG/ML
400 INJECTION, SOLUTION INTRAVENOUS EVERY 12 HOURS
Status: DISCONTINUED | OUTPATIENT
Start: 2019-12-20 | End: 2019-12-21

## 2019-12-20 RX ORDER — MORPHINE SULFATE 2 MG/ML
2 INJECTION, SOLUTION INTRAMUSCULAR; INTRAVENOUS EVERY 4 HOURS PRN
Status: DISCONTINUED | OUTPATIENT
Start: 2019-12-20 | End: 2019-12-24 | Stop reason: HOSPADM

## 2019-12-20 RX ORDER — ACETAMINOPHEN 500 MG
1000 TABLET ORAL ONCE
Status: COMPLETED | OUTPATIENT
Start: 2019-12-20 | End: 2019-12-20

## 2019-12-20 RX ADMIN — LISINOPRIL 40 MG: 10 TABLET ORAL at 23:17

## 2019-12-20 RX ADMIN — ALLOPURINOL 100 MG: 100 TABLET ORAL at 23:18

## 2019-12-20 RX ADMIN — MORPHINE SULFATE 4 MG: 4 INJECTION, SOLUTION INTRAMUSCULAR; INTRAVENOUS at 18:16

## 2019-12-20 RX ADMIN — MORPHINE SULFATE 4 MG: 4 INJECTION, SOLUTION INTRAMUSCULAR; INTRAVENOUS at 19:19

## 2019-12-20 RX ADMIN — SODIUM CHLORIDE: 9 INJECTION, SOLUTION INTRAVENOUS at 23:19

## 2019-12-20 RX ADMIN — SODIUM CHLORIDE, PRESERVATIVE FREE 10 ML: 5 INJECTION INTRAVENOUS at 23:18

## 2019-12-20 RX ADMIN — PIPERACILLIN AND TAZOBACTAM 4.5 G: 4; .5 INJECTION, POWDER, LYOPHILIZED, FOR SOLUTION INTRAVENOUS at 17:12

## 2019-12-20 RX ADMIN — AMLODIPINE BESYLATE 5 MG: 5 TABLET ORAL at 23:17

## 2019-12-20 RX ADMIN — FLECAINIDE ACETATE 100 MG: 100 TABLET ORAL at 23:18

## 2019-12-20 RX ADMIN — MORPHINE SULFATE 2 MG: 2 INJECTION, SOLUTION INTRAMUSCULAR; INTRAVENOUS at 23:17

## 2019-12-20 RX ADMIN — DOCUSATE SODIUM 100 MG: 100 CAPSULE, LIQUID FILLED ORAL at 23:17

## 2019-12-20 RX ADMIN — FUROSEMIDE 20 MG: 20 TABLET ORAL at 23:18

## 2019-12-20 RX ADMIN — IOPAMIDOL 75 ML: 755 INJECTION, SOLUTION INTRAVENOUS at 17:24

## 2019-12-20 RX ADMIN — METRONIDAZOLE 500 MG: 500 INJECTION, SOLUTION INTRAVENOUS at 23:19

## 2019-12-20 RX ADMIN — MORPHINE SULFATE 4 MG: 4 INJECTION, SOLUTION INTRAMUSCULAR; INTRAVENOUS at 16:37

## 2019-12-20 RX ADMIN — ONDANSETRON 4 MG: 2 INJECTION INTRAMUSCULAR; INTRAVENOUS at 16:37

## 2019-12-20 RX ADMIN — SODIUM CHLORIDE 1000 ML: 9 INJECTION, SOLUTION INTRAVENOUS at 16:38

## 2019-12-20 RX ADMIN — ACETAMINOPHEN 1000 MG: 500 TABLET ORAL at 19:18

## 2019-12-20 ASSESSMENT — PAIN DESCRIPTION - PAIN TYPE
TYPE: ACUTE PAIN

## 2019-12-20 ASSESSMENT — PAIN DESCRIPTION - FREQUENCY
FREQUENCY: CONTINUOUS

## 2019-12-20 ASSESSMENT — PAIN DESCRIPTION - ORIENTATION
ORIENTATION: LOWER;RIGHT
ORIENTATION: RIGHT;LOWER
ORIENTATION: LOWER;RIGHT

## 2019-12-20 ASSESSMENT — PAIN - FUNCTIONAL ASSESSMENT
PAIN_FUNCTIONAL_ASSESSMENT: PREVENTS OR INTERFERES SOME ACTIVE ACTIVITIES AND ADLS

## 2019-12-20 ASSESSMENT — PAIN DESCRIPTION - LOCATION
LOCATION: ABDOMEN

## 2019-12-20 ASSESSMENT — PAIN DESCRIPTION - DESCRIPTORS
DESCRIPTORS: ACHING;SORE;NAGGING
DESCRIPTORS: ACHING;NAGGING;SORE
DESCRIPTORS: CRAMPING

## 2019-12-20 ASSESSMENT — PAIN SCALES - GENERAL
PAINLEVEL_OUTOF10: 5
PAINLEVEL_OUTOF10: 4
PAINLEVEL_OUTOF10: 5
PAINLEVEL_OUTOF10: 6
PAINLEVEL_OUTOF10: 5
PAINLEVEL_OUTOF10: 7

## 2019-12-20 ASSESSMENT — PAIN DESCRIPTION - ONSET: ONSET: ON-GOING

## 2019-12-20 ASSESSMENT — PAIN DESCRIPTION - PROGRESSION
CLINICAL_PROGRESSION: NOT CHANGED
CLINICAL_PROGRESSION: GRADUALLY IMPROVING

## 2019-12-21 LAB
ANION GAP SERPL CALCULATED.3IONS-SCNC: 12 MMOL/L (ref 3–16)
BASOPHILS ABSOLUTE: 0.1 K/UL (ref 0–0.2)
BASOPHILS RELATIVE PERCENT: 0.4 %
BUN BLDV-MCNC: 15 MG/DL (ref 7–20)
CALCIUM SERPL-MCNC: 8.4 MG/DL (ref 8.3–10.6)
CHLORIDE BLD-SCNC: 105 MMOL/L (ref 99–110)
CO2: 23 MMOL/L (ref 21–32)
CREAT SERPL-MCNC: 0.8 MG/DL (ref 0.8–1.3)
EOSINOPHILS ABSOLUTE: 0.2 K/UL (ref 0–0.6)
EOSINOPHILS RELATIVE PERCENT: 1.5 %
GFR AFRICAN AMERICAN: >60
GFR NON-AFRICAN AMERICAN: >60
GLUCOSE BLD-MCNC: 109 MG/DL (ref 70–99)
HCT VFR BLD CALC: 28.3 % (ref 40.5–52.5)
HEMATOLOGY PATH CONSULT: NO
HEMOGLOBIN: 8.8 G/DL (ref 13.5–17.5)
INR BLD: 1.53 (ref 0.86–1.14)
LYMPHOCYTES ABSOLUTE: 1.6 K/UL (ref 1–5.1)
LYMPHOCYTES RELATIVE PERCENT: 12.7 %
MCH RBC QN AUTO: 21.1 PG (ref 26–34)
MCHC RBC AUTO-ENTMCNC: 31 G/DL (ref 31–36)
MCV RBC AUTO: 68.2 FL (ref 80–100)
MONOCYTES ABSOLUTE: 1.4 K/UL (ref 0–1.3)
MONOCYTES RELATIVE PERCENT: 11 %
NEUTROPHILS ABSOLUTE: 9.6 K/UL (ref 1.7–7.7)
NEUTROPHILS RELATIVE PERCENT: 74.4 %
PDW BLD-RTO: 19 % (ref 12.4–15.4)
PLATELET # BLD: 357 K/UL (ref 135–450)
PMV BLD AUTO: 7.3 FL (ref 5–10.5)
POTASSIUM REFLEX MAGNESIUM: 3.7 MMOL/L (ref 3.5–5.1)
PROTHROMBIN TIME: 17.8 SEC (ref 10–13.2)
RBC # BLD: 4.15 M/UL (ref 4.2–5.9)
SODIUM BLD-SCNC: 140 MMOL/L (ref 136–145)
URINE CULTURE, ROUTINE: NORMAL
WBC # BLD: 12.9 K/UL (ref 4–11)

## 2019-12-21 PROCEDURE — 6370000000 HC RX 637 (ALT 250 FOR IP): Performed by: SURGERY

## 2019-12-21 PROCEDURE — 2580000003 HC RX 258: Performed by: SURGERY

## 2019-12-21 PROCEDURE — APPSS180 APP SPLIT SHARED TIME > 60 MINUTES: Performed by: PHYSICIAN ASSISTANT

## 2019-12-21 PROCEDURE — 99024 POSTOP FOLLOW-UP VISIT: CPT | Performed by: SURGERY

## 2019-12-21 PROCEDURE — 85025 COMPLETE CBC W/AUTO DIFF WBC: CPT

## 2019-12-21 PROCEDURE — 6360000002 HC RX W HCPCS: Performed by: SURGERY

## 2019-12-21 PROCEDURE — 85610 PROTHROMBIN TIME: CPT

## 2019-12-21 PROCEDURE — 94760 N-INVAS EAR/PLS OXIMETRY 1: CPT

## 2019-12-21 PROCEDURE — 1200000000 HC SEMI PRIVATE

## 2019-12-21 PROCEDURE — 99223 1ST HOSP IP/OBS HIGH 75: CPT | Performed by: INTERNAL MEDICINE

## 2019-12-21 PROCEDURE — 36415 COLL VENOUS BLD VENIPUNCTURE: CPT

## 2019-12-21 PROCEDURE — 80048 BASIC METABOLIC PNL TOTAL CA: CPT

## 2019-12-21 PROCEDURE — APPNB180 APP NON BILLABLE TIME > 60 MINS: Performed by: PHYSICIAN ASSISTANT

## 2019-12-21 PROCEDURE — 2500000003 HC RX 250 WO HCPCS: Performed by: SURGERY

## 2019-12-21 PROCEDURE — 94660 CPAP INITIATION&MGMT: CPT

## 2019-12-21 PROCEDURE — 6370000000 HC RX 637 (ALT 250 FOR IP): Performed by: INTERNAL MEDICINE

## 2019-12-21 RX ORDER — ACETAMINOPHEN 325 MG/1
650 TABLET ORAL EVERY 4 HOURS PRN
Status: DISCONTINUED | OUTPATIENT
Start: 2019-12-21 | End: 2019-12-24 | Stop reason: HOSPADM

## 2019-12-21 RX ORDER — LISINOPRIL 20 MG/1
20 TABLET ORAL DAILY
Status: DISCONTINUED | OUTPATIENT
Start: 2019-12-22 | End: 2019-12-24 | Stop reason: HOSPADM

## 2019-12-21 RX ADMIN — METRONIDAZOLE 500 MG: 500 INJECTION, SOLUTION INTRAVENOUS at 06:29

## 2019-12-21 RX ADMIN — OXYCODONE 10 MG: 5 TABLET ORAL at 06:31

## 2019-12-21 RX ADMIN — DOCUSATE SODIUM 100 MG: 100 CAPSULE, LIQUID FILLED ORAL at 20:13

## 2019-12-21 RX ADMIN — SODIUM CHLORIDE: 9 INJECTION, SOLUTION INTRAVENOUS at 20:11

## 2019-12-21 RX ADMIN — LISINOPRIL 40 MG: 10 TABLET ORAL at 07:58

## 2019-12-21 RX ADMIN — ALLOPURINOL 100 MG: 100 TABLET ORAL at 07:58

## 2019-12-21 RX ADMIN — ENOXAPARIN SODIUM 40 MG: 40 INJECTION SUBCUTANEOUS at 12:17

## 2019-12-21 RX ADMIN — FUROSEMIDE 20 MG: 20 TABLET ORAL at 07:58

## 2019-12-21 RX ADMIN — FLECAINIDE ACETATE 100 MG: 100 TABLET ORAL at 20:13

## 2019-12-21 RX ADMIN — DOCUSATE SODIUM 100 MG: 100 CAPSULE, LIQUID FILLED ORAL at 07:58

## 2019-12-21 RX ADMIN — FLECAINIDE ACETATE 100 MG: 100 TABLET ORAL at 07:58

## 2019-12-21 RX ADMIN — MORPHINE SULFATE 2 MG: 2 INJECTION, SOLUTION INTRAMUSCULAR; INTRAVENOUS at 03:17

## 2019-12-21 RX ADMIN — AMLODIPINE BESYLATE 5 MG: 5 TABLET ORAL at 07:58

## 2019-12-21 RX ADMIN — PIPERACILLIN AND TAZOBACTAM 3.38 G: 3; .375 INJECTION, POWDER, LYOPHILIZED, FOR SOLUTION INTRAVENOUS at 20:12

## 2019-12-21 RX ADMIN — CIPROFLOXACIN 400 MG: 2 INJECTION, SOLUTION INTRAVENOUS at 00:30

## 2019-12-21 RX ADMIN — PIPERACILLIN AND TAZOBACTAM 3.38 G: 3; .375 INJECTION, POWDER, LYOPHILIZED, FOR SOLUTION INTRAVENOUS at 12:17

## 2019-12-21 RX ADMIN — MORPHINE SULFATE 2 MG: 2 INJECTION, SOLUTION INTRAMUSCULAR; INTRAVENOUS at 07:59

## 2019-12-21 RX ADMIN — ACETAMINOPHEN 650 MG: 325 TABLET ORAL at 14:38

## 2019-12-21 ASSESSMENT — PAIN DESCRIPTION - DESCRIPTORS
DESCRIPTORS: ACHING

## 2019-12-21 ASSESSMENT — PAIN DESCRIPTION - ORIENTATION
ORIENTATION: RIGHT;LOWER

## 2019-12-21 ASSESSMENT — PAIN DESCRIPTION - PAIN TYPE
TYPE: ACUTE PAIN

## 2019-12-21 ASSESSMENT — PAIN DESCRIPTION - LOCATION
LOCATION: ABDOMEN

## 2019-12-21 ASSESSMENT — PAIN DESCRIPTION - ONSET
ONSET: ON-GOING

## 2019-12-21 ASSESSMENT — PAIN SCALES - GENERAL
PAINLEVEL_OUTOF10: 4
PAINLEVEL_OUTOF10: 8
PAINLEVEL_OUTOF10: 8
PAINLEVEL_OUTOF10: 5
PAINLEVEL_OUTOF10: 8
PAINLEVEL_OUTOF10: 0
PAINLEVEL_OUTOF10: 7

## 2019-12-21 ASSESSMENT — PAIN - FUNCTIONAL ASSESSMENT
PAIN_FUNCTIONAL_ASSESSMENT: PREVENTS OR INTERFERES SOME ACTIVE ACTIVITIES AND ADLS

## 2019-12-21 ASSESSMENT — PAIN DESCRIPTION - PROGRESSION
CLINICAL_PROGRESSION: NOT CHANGED

## 2019-12-21 ASSESSMENT — PAIN DESCRIPTION - FREQUENCY
FREQUENCY: CONTINUOUS

## 2019-12-22 LAB
ANION GAP SERPL CALCULATED.3IONS-SCNC: 15 MMOL/L (ref 3–16)
BUN BLDV-MCNC: 15 MG/DL (ref 7–20)
CALCIUM SERPL-MCNC: 8.5 MG/DL (ref 8.3–10.6)
CHLORIDE BLD-SCNC: 104 MMOL/L (ref 99–110)
CO2: 23 MMOL/L (ref 21–32)
CREAT SERPL-MCNC: 0.8 MG/DL (ref 0.8–1.3)
GFR AFRICAN AMERICAN: >60
GFR NON-AFRICAN AMERICAN: >60
GLUCOSE BLD-MCNC: 116 MG/DL (ref 70–99)
HCT VFR BLD CALC: 30.2 % (ref 40.5–52.5)
HEMATOLOGY PATH CONSULT: NO
HEMOGLOBIN: 9.3 G/DL (ref 13.5–17.5)
INR BLD: 1.71 (ref 0.86–1.14)
MCH RBC QN AUTO: 21.2 PG (ref 26–34)
MCHC RBC AUTO-ENTMCNC: 30.9 G/DL (ref 31–36)
MCV RBC AUTO: 68.5 FL (ref 80–100)
PDW BLD-RTO: 18.9 % (ref 12.4–15.4)
PLATELET # BLD: 409 K/UL (ref 135–450)
PMV BLD AUTO: 7.4 FL (ref 5–10.5)
POTASSIUM SERPL-SCNC: 3.6 MMOL/L (ref 3.5–5.1)
PROTHROMBIN TIME: 19.9 SEC (ref 10–13.2)
RBC # BLD: 4.41 M/UL (ref 4.2–5.9)
SODIUM BLD-SCNC: 142 MMOL/L (ref 136–145)
WBC # BLD: 15.9 K/UL (ref 4–11)

## 2019-12-22 PROCEDURE — 6370000000 HC RX 637 (ALT 250 FOR IP): Performed by: INTERNAL MEDICINE

## 2019-12-22 PROCEDURE — 85027 COMPLETE CBC AUTOMATED: CPT

## 2019-12-22 PROCEDURE — 80048 BASIC METABOLIC PNL TOTAL CA: CPT

## 2019-12-22 PROCEDURE — 94760 N-INVAS EAR/PLS OXIMETRY 1: CPT

## 2019-12-22 PROCEDURE — 6370000000 HC RX 637 (ALT 250 FOR IP): Performed by: SURGERY

## 2019-12-22 PROCEDURE — 99024 POSTOP FOLLOW-UP VISIT: CPT | Performed by: SURGERY

## 2019-12-22 PROCEDURE — 99232 SBSQ HOSP IP/OBS MODERATE 35: CPT | Performed by: INTERNAL MEDICINE

## 2019-12-22 PROCEDURE — 1200000000 HC SEMI PRIVATE

## 2019-12-22 PROCEDURE — 2580000003 HC RX 258: Performed by: SURGERY

## 2019-12-22 PROCEDURE — 6360000002 HC RX W HCPCS: Performed by: SURGERY

## 2019-12-22 PROCEDURE — 36415 COLL VENOUS BLD VENIPUNCTURE: CPT

## 2019-12-22 PROCEDURE — 85610 PROTHROMBIN TIME: CPT

## 2019-12-22 RX ORDER — ZOLPIDEM TARTRATE 5 MG/1
5 TABLET ORAL NIGHTLY PRN
Status: DISCONTINUED | OUTPATIENT
Start: 2019-12-22 | End: 2019-12-24 | Stop reason: HOSPADM

## 2019-12-22 RX ORDER — PHYTONADIONE 5 MG/1
5 TABLET ORAL ONCE
Status: COMPLETED | OUTPATIENT
Start: 2019-12-22 | End: 2019-12-22

## 2019-12-22 RX ADMIN — ZOLPIDEM TARTRATE 5 MG: 5 TABLET ORAL at 21:20

## 2019-12-22 RX ADMIN — PIPERACILLIN AND TAZOBACTAM 3.38 G: 3; .375 INJECTION, POWDER, LYOPHILIZED, FOR SOLUTION INTRAVENOUS at 11:35

## 2019-12-22 RX ADMIN — ACETAMINOPHEN 650 MG: 325 TABLET ORAL at 04:20

## 2019-12-22 RX ADMIN — PIPERACILLIN AND TAZOBACTAM 3.38 G: 3; .375 INJECTION, POWDER, LYOPHILIZED, FOR SOLUTION INTRAVENOUS at 04:18

## 2019-12-22 RX ADMIN — ENOXAPARIN SODIUM 40 MG: 40 INJECTION SUBCUTANEOUS at 07:44

## 2019-12-22 RX ADMIN — ACETAMINOPHEN 650 MG: 325 TABLET ORAL at 18:25

## 2019-12-22 RX ADMIN — SODIUM CHLORIDE: 9 INJECTION, SOLUTION INTRAVENOUS at 21:05

## 2019-12-22 RX ADMIN — DOCUSATE SODIUM 100 MG: 100 CAPSULE, LIQUID FILLED ORAL at 07:44

## 2019-12-22 RX ADMIN — DOCUSATE SODIUM 100 MG: 100 CAPSULE, LIQUID FILLED ORAL at 21:20

## 2019-12-22 RX ADMIN — PIPERACILLIN AND TAZOBACTAM 3.38 G: 3; .375 INJECTION, POWDER, LYOPHILIZED, FOR SOLUTION INTRAVENOUS at 21:00

## 2019-12-22 RX ADMIN — LISINOPRIL 20 MG: 20 TABLET ORAL at 07:44

## 2019-12-22 RX ADMIN — FLECAINIDE ACETATE 100 MG: 100 TABLET ORAL at 07:44

## 2019-12-22 RX ADMIN — PHYTONADIONE 5 MG: 5 TABLET ORAL at 11:35

## 2019-12-22 RX ADMIN — FLECAINIDE ACETATE 100 MG: 100 TABLET ORAL at 21:20

## 2019-12-22 RX ADMIN — ALLOPURINOL 100 MG: 100 TABLET ORAL at 07:44

## 2019-12-22 ASSESSMENT — PAIN DESCRIPTION - PROGRESSION
CLINICAL_PROGRESSION: NOT CHANGED
CLINICAL_PROGRESSION: NOT CHANGED
CLINICAL_PROGRESSION: GRADUALLY IMPROVING

## 2019-12-22 ASSESSMENT — PAIN DESCRIPTION - PAIN TYPE
TYPE: ACUTE PAIN
TYPE: ACUTE PAIN

## 2019-12-22 ASSESSMENT — PAIN SCALES - GENERAL
PAINLEVEL_OUTOF10: 0
PAINLEVEL_OUTOF10: 4
PAINLEVEL_OUTOF10: 0
PAINLEVEL_OUTOF10: 4

## 2019-12-22 ASSESSMENT — PAIN DESCRIPTION - ONSET
ONSET: ON-GOING
ONSET: ON-GOING

## 2019-12-22 ASSESSMENT — PAIN DESCRIPTION - FREQUENCY
FREQUENCY: INTERMITTENT
FREQUENCY: CONTINUOUS

## 2019-12-22 ASSESSMENT — PAIN DESCRIPTION - LOCATION
LOCATION: ABDOMEN
LOCATION: ABDOMEN

## 2019-12-22 ASSESSMENT — PAIN - FUNCTIONAL ASSESSMENT
PAIN_FUNCTIONAL_ASSESSMENT: PREVENTS OR INTERFERES SOME ACTIVE ACTIVITIES AND ADLS
PAIN_FUNCTIONAL_ASSESSMENT: PREVENTS OR INTERFERES SOME ACTIVE ACTIVITIES AND ADLS

## 2019-12-22 ASSESSMENT — PAIN DESCRIPTION - ORIENTATION
ORIENTATION: RIGHT;LOWER
ORIENTATION: LOWER;RIGHT

## 2019-12-22 ASSESSMENT — PAIN DESCRIPTION - DESCRIPTORS
DESCRIPTORS: ACHING
DESCRIPTORS: ACHING

## 2019-12-23 ENCOUNTER — APPOINTMENT (OUTPATIENT)
Dept: CT IMAGING | Age: 74
DRG: 919 | End: 2019-12-23
Payer: MEDICARE

## 2019-12-23 LAB
ANION GAP SERPL CALCULATED.3IONS-SCNC: 15 MMOL/L (ref 3–16)
BUN BLDV-MCNC: 13 MG/DL (ref 7–20)
CALCIUM SERPL-MCNC: 8.3 MG/DL (ref 8.3–10.6)
CHLORIDE BLD-SCNC: 104 MMOL/L (ref 99–110)
CO2: 21 MMOL/L (ref 21–32)
CREAT SERPL-MCNC: 0.8 MG/DL (ref 0.8–1.3)
GFR AFRICAN AMERICAN: >60
GFR NON-AFRICAN AMERICAN: >60
GLUCOSE BLD-MCNC: 106 MG/DL (ref 70–99)
GLUCOSE BLD-MCNC: 111 MG/DL (ref 70–99)
HCT VFR BLD CALC: 28.5 % (ref 40.5–52.5)
HEMATOLOGY PATH CONSULT: NO
HEMATOLOGY PATH CONSULT: NORMAL
HEMOGLOBIN: 8.9 G/DL (ref 13.5–17.5)
INR BLD: 1.71 (ref 0.86–1.14)
MCH RBC QN AUTO: 21.2 PG (ref 26–34)
MCHC RBC AUTO-ENTMCNC: 31.4 G/DL (ref 31–36)
MCV RBC AUTO: 67.6 FL (ref 80–100)
PDW BLD-RTO: 19.1 % (ref 12.4–15.4)
PERFORMED ON: ABNORMAL
PLATELET # BLD: 431 K/UL (ref 135–450)
PMV BLD AUTO: 7.6 FL (ref 5–10.5)
POTASSIUM SERPL-SCNC: 3.6 MMOL/L (ref 3.5–5.1)
PROTHROMBIN TIME: 20 SEC (ref 10–13.2)
RBC # BLD: 4.22 M/UL (ref 4.2–5.9)
SODIUM BLD-SCNC: 140 MMOL/L (ref 136–145)
WBC # BLD: 11.8 K/UL (ref 4–11)

## 2019-12-23 PROCEDURE — 36415 COLL VENOUS BLD VENIPUNCTURE: CPT

## 2019-12-23 PROCEDURE — 85610 PROTHROMBIN TIME: CPT

## 2019-12-23 PROCEDURE — 77012 CT SCAN FOR NEEDLE BIOPSY: CPT

## 2019-12-23 PROCEDURE — 94760 N-INVAS EAR/PLS OXIMETRY 1: CPT

## 2019-12-23 PROCEDURE — 99024 POSTOP FOLLOW-UP VISIT: CPT | Performed by: SURGERY

## 2019-12-23 PROCEDURE — 6370000000 HC RX 637 (ALT 250 FOR IP): Performed by: SURGERY

## 2019-12-23 PROCEDURE — 85027 COMPLETE CBC AUTOMATED: CPT

## 2019-12-23 PROCEDURE — 87077 CULTURE AEROBIC IDENTIFY: CPT

## 2019-12-23 PROCEDURE — 0W9G3ZX DRAINAGE OF PERITONEAL CAVITY, PERCUTANEOUS APPROACH, DIAGNOSTIC: ICD-10-PCS | Performed by: RADIOLOGY

## 2019-12-23 PROCEDURE — 6370000000 HC RX 637 (ALT 250 FOR IP): Performed by: INTERNAL MEDICINE

## 2019-12-23 PROCEDURE — 87070 CULTURE OTHR SPECIMN AEROBIC: CPT

## 2019-12-23 PROCEDURE — APPNB30 APP NON BILLABLE TIME 0-30 MINS: Performed by: PHYSICIAN ASSISTANT

## 2019-12-23 PROCEDURE — 1200000000 HC SEMI PRIVATE

## 2019-12-23 PROCEDURE — 87205 SMEAR GRAM STAIN: CPT

## 2019-12-23 PROCEDURE — APPSS15 APP SPLIT SHARED TIME 0-15 MINUTES: Performed by: PHYSICIAN ASSISTANT

## 2019-12-23 PROCEDURE — 6360000002 HC RX W HCPCS: Performed by: RADIOLOGY

## 2019-12-23 PROCEDURE — 99233 SBSQ HOSP IP/OBS HIGH 50: CPT | Performed by: INTERNAL MEDICINE

## 2019-12-23 PROCEDURE — 80048 BASIC METABOLIC PNL TOTAL CA: CPT

## 2019-12-23 PROCEDURE — 87075 CULTR BACTERIA EXCEPT BLOOD: CPT

## 2019-12-23 PROCEDURE — 2580000003 HC RX 258: Performed by: SURGERY

## 2019-12-23 PROCEDURE — 2709999900 CT GUIDED FNA

## 2019-12-23 PROCEDURE — 6360000002 HC RX W HCPCS: Performed by: SURGERY

## 2019-12-23 RX ORDER — MIDAZOLAM HYDROCHLORIDE 1 MG/ML
INJECTION INTRAMUSCULAR; INTRAVENOUS DAILY PRN
Status: COMPLETED | OUTPATIENT
Start: 2019-12-23 | End: 2019-12-23

## 2019-12-23 RX ORDER — PHYTONADIONE 5 MG/1
10 TABLET ORAL ONCE
Status: COMPLETED | OUTPATIENT
Start: 2019-12-23 | End: 2019-12-23

## 2019-12-23 RX ORDER — FENTANYL CITRATE 50 UG/ML
INJECTION, SOLUTION INTRAMUSCULAR; INTRAVENOUS DAILY PRN
Status: COMPLETED | OUTPATIENT
Start: 2019-12-23 | End: 2019-12-23

## 2019-12-23 RX ADMIN — DOCUSATE SODIUM 100 MG: 100 CAPSULE, LIQUID FILLED ORAL at 09:13

## 2019-12-23 RX ADMIN — SODIUM CHLORIDE, PRESERVATIVE FREE 10 ML: 5 INJECTION INTRAVENOUS at 10:58

## 2019-12-23 RX ADMIN — PIPERACILLIN AND TAZOBACTAM 3.38 G: 3; .375 INJECTION, POWDER, LYOPHILIZED, FOR SOLUTION INTRAVENOUS at 10:57

## 2019-12-23 RX ADMIN — ALLOPURINOL 100 MG: 100 TABLET ORAL at 09:13

## 2019-12-23 RX ADMIN — FLECAINIDE ACETATE 100 MG: 100 TABLET ORAL at 21:16

## 2019-12-23 RX ADMIN — DOCUSATE SODIUM 100 MG: 100 CAPSULE, LIQUID FILLED ORAL at 21:16

## 2019-12-23 RX ADMIN — LISINOPRIL 20 MG: 20 TABLET ORAL at 09:13

## 2019-12-23 RX ADMIN — PHYTONADIONE 10 MG: 5 TABLET ORAL at 09:28

## 2019-12-23 RX ADMIN — PIPERACILLIN AND TAZOBACTAM 3.38 G: 3; .375 INJECTION, POWDER, LYOPHILIZED, FOR SOLUTION INTRAVENOUS at 21:16

## 2019-12-23 RX ADMIN — PIPERACILLIN AND TAZOBACTAM 3.38 G: 3; .375 INJECTION, POWDER, LYOPHILIZED, FOR SOLUTION INTRAVENOUS at 04:21

## 2019-12-23 RX ADMIN — SODIUM CHLORIDE, PRESERVATIVE FREE 10 ML: 5 INJECTION INTRAVENOUS at 21:16

## 2019-12-23 RX ADMIN — FENTANYL CITRATE 50 MCG: 50 INJECTION INTRAMUSCULAR; INTRAVENOUS at 13:08

## 2019-12-23 RX ADMIN — ACETAMINOPHEN 650 MG: 325 TABLET ORAL at 09:26

## 2019-12-23 RX ADMIN — ZOLPIDEM TARTRATE 5 MG: 5 TABLET ORAL at 23:32

## 2019-12-23 RX ADMIN — FLECAINIDE ACETATE 100 MG: 100 TABLET ORAL at 09:13

## 2019-12-23 RX ADMIN — OXYCODONE HYDROCHLORIDE 5 MG: 5 TABLET ORAL at 09:26

## 2019-12-23 RX ADMIN — MIDAZOLAM 1 MG: 1 INJECTION INTRAMUSCULAR; INTRAVENOUS at 13:08

## 2019-12-23 RX ADMIN — SODIUM CHLORIDE: 9 INJECTION, SOLUTION INTRAVENOUS at 10:56

## 2019-12-23 ASSESSMENT — PAIN SCALES - GENERAL
PAINLEVEL_OUTOF10: 2
PAINLEVEL_OUTOF10: 5
PAINLEVEL_OUTOF10: 2

## 2019-12-23 ASSESSMENT — PAIN DESCRIPTION - ORIENTATION: ORIENTATION: LOWER;RIGHT

## 2019-12-23 ASSESSMENT — PAIN DESCRIPTION - PAIN TYPE: TYPE: ACUTE PAIN

## 2019-12-23 ASSESSMENT — PAIN DESCRIPTION - LOCATION: LOCATION: ABDOMEN

## 2019-12-23 ASSESSMENT — PAIN DESCRIPTION - DESCRIPTORS: DESCRIPTORS: ACHING

## 2019-12-23 ASSESSMENT — PAIN DESCRIPTION - PROGRESSION: CLINICAL_PROGRESSION: NOT CHANGED

## 2019-12-23 ASSESSMENT — PAIN - FUNCTIONAL ASSESSMENT: PAIN_FUNCTIONAL_ASSESSMENT: PREVENTS OR INTERFERES SOME ACTIVE ACTIVITIES AND ADLS

## 2019-12-23 ASSESSMENT — PAIN DESCRIPTION - ONSET: ONSET: ON-GOING

## 2019-12-23 ASSESSMENT — PAIN DESCRIPTION - FREQUENCY: FREQUENCY: INTERMITTENT

## 2019-12-24 VITALS
SYSTOLIC BLOOD PRESSURE: 132 MMHG | OXYGEN SATURATION: 95 % | HEART RATE: 67 BPM | TEMPERATURE: 99.3 F | BODY MASS INDEX: 42.61 KG/M2 | WEIGHT: 287.7 LBS | HEIGHT: 69 IN | RESPIRATION RATE: 18 BRPM | DIASTOLIC BLOOD PRESSURE: 60 MMHG

## 2019-12-24 LAB
ANION GAP SERPL CALCULATED.3IONS-SCNC: 14 MMOL/L (ref 3–16)
BLOOD CULTURE, ROUTINE: NORMAL
BUN BLDV-MCNC: 12 MG/DL (ref 7–20)
CALCIUM SERPL-MCNC: 8.5 MG/DL (ref 8.3–10.6)
CHLORIDE BLD-SCNC: 105 MMOL/L (ref 99–110)
CO2: 21 MMOL/L (ref 21–32)
CREAT SERPL-MCNC: 0.6 MG/DL (ref 0.8–1.3)
CULTURE, BLOOD 2: NORMAL
GFR AFRICAN AMERICAN: >60
GFR NON-AFRICAN AMERICAN: >60
GLUCOSE BLD-MCNC: 110 MG/DL (ref 70–99)
INR BLD: 1.37 (ref 0.86–1.14)
POTASSIUM SERPL-SCNC: 3.6 MMOL/L (ref 3.5–5.1)
PROTHROMBIN TIME: 16 SEC (ref 10–13.2)
SODIUM BLD-SCNC: 140 MMOL/L (ref 136–145)

## 2019-12-24 PROCEDURE — 99024 POSTOP FOLLOW-UP VISIT: CPT | Performed by: PHYSICIAN ASSISTANT

## 2019-12-24 PROCEDURE — 85027 COMPLETE CBC AUTOMATED: CPT

## 2019-12-24 PROCEDURE — 6370000000 HC RX 637 (ALT 250 FOR IP): Performed by: SURGERY

## 2019-12-24 PROCEDURE — 99233 SBSQ HOSP IP/OBS HIGH 50: CPT | Performed by: INTERNAL MEDICINE

## 2019-12-24 PROCEDURE — APPSS15 APP SPLIT SHARED TIME 0-15 MINUTES: Performed by: PHYSICIAN ASSISTANT

## 2019-12-24 PROCEDURE — 80048 BASIC METABOLIC PNL TOTAL CA: CPT

## 2019-12-24 PROCEDURE — APPNB30 APP NON BILLABLE TIME 0-30 MINS: Performed by: PHYSICIAN ASSISTANT

## 2019-12-24 PROCEDURE — 85610 PROTHROMBIN TIME: CPT

## 2019-12-24 PROCEDURE — 6360000002 HC RX W HCPCS: Performed by: SURGERY

## 2019-12-24 PROCEDURE — 36415 COLL VENOUS BLD VENIPUNCTURE: CPT

## 2019-12-24 PROCEDURE — 6370000000 HC RX 637 (ALT 250 FOR IP): Performed by: INTERNAL MEDICINE

## 2019-12-24 PROCEDURE — 2580000003 HC RX 258: Performed by: SURGERY

## 2019-12-24 PROCEDURE — 99024 POSTOP FOLLOW-UP VISIT: CPT | Performed by: SURGERY

## 2019-12-24 RX ORDER — AMOXICILLIN AND CLAVULANATE POTASSIUM 875; 125 MG/1; MG/1
1 TABLET, FILM COATED ORAL 2 TIMES DAILY
Qty: 14 TABLET | Refills: 0 | Status: SHIPPED | OUTPATIENT
Start: 2019-12-24 | End: 2019-12-31

## 2019-12-24 RX ADMIN — ALLOPURINOL 100 MG: 100 TABLET ORAL at 09:09

## 2019-12-24 RX ADMIN — SODIUM CHLORIDE: 9 INJECTION, SOLUTION INTRAVENOUS at 03:13

## 2019-12-24 RX ADMIN — PIPERACILLIN AND TAZOBACTAM 3.38 G: 3; .375 INJECTION, POWDER, LYOPHILIZED, FOR SOLUTION INTRAVENOUS at 04:56

## 2019-12-24 RX ADMIN — FLECAINIDE ACETATE 100 MG: 100 TABLET ORAL at 09:09

## 2019-12-24 RX ADMIN — DOCUSATE SODIUM 100 MG: 100 CAPSULE, LIQUID FILLED ORAL at 09:09

## 2019-12-24 RX ADMIN — LISINOPRIL 20 MG: 20 TABLET ORAL at 09:09

## 2019-12-24 ASSESSMENT — PAIN SCALES - GENERAL: PAINLEVEL_OUTOF10: 0

## 2019-12-26 ENCOUNTER — CARE COORDINATION (OUTPATIENT)
Dept: CASE MANAGEMENT | Age: 74
End: 2019-12-26

## 2019-12-26 ENCOUNTER — TELEPHONE (OUTPATIENT)
Dept: SURGERY | Age: 74
End: 2019-12-26

## 2019-12-26 DIAGNOSIS — R10.9 ABDOMINAL PAIN, UNSPECIFIED ABDOMINAL LOCATION: Primary | ICD-10-CM

## 2019-12-26 LAB
HCT VFR BLD CALC: 28.9 % (ref 40.5–52.5)
HEMATOLOGY PATH CONSULT: NO
HEMOGLOBIN: 8.9 G/DL (ref 13.5–17.5)
MCH RBC QN AUTO: 20.8 PG (ref 26–34)
MCHC RBC AUTO-ENTMCNC: 30.8 G/DL (ref 31–36)
MCV RBC AUTO: 67.7 FL (ref 80–100)
PDW BLD-RTO: 19.1 % (ref 12.4–15.4)
PLATELET # BLD: 481 K/UL (ref 135–450)
PMV BLD AUTO: 7.4 FL (ref 5–10.5)
RBC # BLD: 4.28 M/UL (ref 4.2–5.9)
WBC # BLD: 11 K/UL (ref 4–11)

## 2019-12-26 PROCEDURE — 1111F DSCHRG MED/CURRENT MED MERGE: CPT

## 2019-12-28 LAB
ANAEROBIC CULTURE: ABNORMAL
GRAM STAIN RESULT: ABNORMAL
ORGANISM: ABNORMAL
WOUND/ABSCESS: ABNORMAL

## 2020-01-02 ENCOUNTER — OFFICE VISIT (OUTPATIENT)
Dept: SLEEP MEDICINE | Age: 75
End: 2020-01-02
Payer: MEDICARE

## 2020-01-02 ENCOUNTER — TELEPHONE (OUTPATIENT)
Dept: SURGERY | Age: 75
End: 2020-01-02

## 2020-01-02 VITALS
HEART RATE: 69 BPM | HEIGHT: 69 IN | SYSTOLIC BLOOD PRESSURE: 130 MMHG | DIASTOLIC BLOOD PRESSURE: 82 MMHG | TEMPERATURE: 97.9 F | RESPIRATION RATE: 16 BRPM | WEIGHT: 270 LBS | OXYGEN SATURATION: 97 % | BODY MASS INDEX: 39.99 KG/M2

## 2020-01-02 PROCEDURE — 99213 OFFICE O/P EST LOW 20 MIN: CPT | Performed by: PSYCHIATRY & NEUROLOGY

## 2020-01-02 ASSESSMENT — ENCOUNTER SYMPTOMS
CHOKING: 0
APNEA: 0

## 2020-01-02 ASSESSMENT — SLEEP AND FATIGUE QUESTIONNAIRES
HOW LIKELY ARE YOU TO NOD OFF OR FALL ASLEEP WHILE SITTING AND READING: 2
HOW LIKELY ARE YOU TO NOD OFF OR FALL ASLEEP WHILE LYING DOWN TO REST IN THE AFTERNOON WHEN CIRCUMSTANCES PERMIT: 2
HOW LIKELY ARE YOU TO NOD OFF OR FALL ASLEEP WHILE SITTING QUIETLY AFTER LUNCH WITHOUT ALCOHOL: 1
HOW LIKELY ARE YOU TO NOD OFF OR FALL ASLEEP WHILE SITTING AND TALKING TO SOMEONE: 1
HOW LIKELY ARE YOU TO NOD OFF OR FALL ASLEEP WHILE SITTING INACTIVE IN A PUBLIC PLACE: 1
HOW LIKELY ARE YOU TO NOD OFF OR FALL ASLEEP WHEN YOU ARE A PASSENGER IN A CAR FOR AN HOUR WITHOUT A BREAK: 1
ESS TOTAL SCORE: 11
HOW LIKELY ARE YOU TO NOD OFF OR FALL ASLEEP WHILE WATCHING TV: 2
HOW LIKELY ARE YOU TO NOD OFF OR FALL ASLEEP IN A CAR, WHILE STOPPED FOR A FEW MINUTES IN TRAFFIC: 1

## 2020-01-02 NOTE — TELEPHONE ENCOUNTER
Remigio Walker / [facility] is requesting a return call to clarify Antibiotic order. Please advise. Thank you!

## 2020-01-02 NOTE — TELEPHONE ENCOUNTER
Spoke with Briseida Vincent. PT order for antibiotics was sent with surgery order. Briseida Vincent notified.

## 2020-01-02 NOTE — PROGRESS NOTES
Packs/day: 0.25     Years: 12.00     Pack years: 3.00     Types: Cigarettes     Start date: 1960     Last attempt to quit: 1971     Years since quittin.4    Smokeless tobacco: Never Used   Substance and Sexual Activity    Alcohol use: Yes     Comment: occ     Drug use: No    Sexual activity: Not Currently   Lifestyle    Physical activity:     Days per week: Not on file     Minutes per session: Not on file    Stress: Not on file   Relationships    Social connections:     Talks on phone: Not on file     Gets together: Not on file     Attends [employer] service: Not on file     Active member of club or organization: Not on file     Attends meetings of clubs or organizations: Not on file     Relationship status: Not on file    Intimate partner violence:     Fear of current or ex partner: Not on file     Emotionally abused: Not on file     Physically abused: Not on file     Forced sexual activity: Not on file   Other Topics Concern    Not on file   Social History Narrative    Not on file       Prior to Admission medications    Medication Sig Start Date End Date Taking? Authorizing Provider   lovastatin (MEVACOR) 20 MG tablet TAKE ONE TABLET BY MOUTH ONCE NIGHTLY 10/23/19  Yes Hossein Lopez MD   allopurinol (ZYLOPRIM) 100 MG tablet TAKE ONE TABLET BY MOUTH DAILY 19  Yes Hossein Lopez MD   warfarin (COUMADIN) 5 MG tablet Take 2.5 mg by mouth daily at 1800 Patient takes 2.5mg three times weekly on Monday, Wednesday and Friday. He takes no warfarin at all on other days of the week.    Yes Historical Provider, MD   lisinopril (PRINIVIL;ZESTRIL) 40 MG tablet daily  10/26/10  Yes Historical Provider, MD   flecainide (TAMBOCOR) 100 MG tablet Take 100 mg by mouth 2 times daily  10/26/10  Yes Historical Provider, MD   amlodipine (NORVASC) 5 MG tablet daily  11/2/10  Yes Historical Provider, MD   furosemide (LASIX) 20 MG tablet 20 mg 2 times daily 2 pill in AM, 1 pill in PM 11/9/10  Yes Historical MD Yulia       Allergies as of 01/02/2020 - Review Complete 01/02/2020   Allergen Reaction Noted    Atorvastatin  02/13/2015       Patient Active Problem List   Diagnosis    Primary localized osteoarthrosis, lower leg    Disorder of bone and cartilage    Arthropathy    Pain in joint, lower leg    Otalgia    Abdominal pain    Hemorrhage of rectum and anus    Essential and other specified forms of tremor    Pain in limb    Edema    Psychosexual dysfunction with inhibited sexual excitement    Pure hypercholesterolemia    Depressive disorder, not elsewhere classified    Obesity    Essential hypertension, benign    Osteoarthritis    Status post total knee replacement    Low back pain    Chest pain    Otalgia    Paroxysmal atrial fibrillation (HCC)    Pain of toe of right foot    URTI (acute upper respiratory infection)    Rectal bleeding    Cancer of ascending colon ([address]    Colon cancer ([address]    S/P partial colectomy    Sepsis ([address]       Past Medical History:   Diagnosis Date    Arthritis     Atrial fibrillation ([address]     CAD (coronary artery disease)     Colon cancer ([address]     CPAP (continuous positive airway pressure) dependence     Difficult intubation     many years ago, has been OK recently     Gout     High blood pressure     Hyperlipidemia     Sleep apnea        Past Surgical History:   Procedure Laterality Date    ABDOMEN SURGERY      COLECTOMY      COLONOSCOPY  02/12/2014    sm sessile polyp  hemorrhoids--kassi 2019---dr mora     COLONOSCOPY N/A 10/25/2019    COLONOSCOPY WITH BIOPSY performed by Octaviano Floyd MD at [address] Right 12/13/2019    LAPAROSCOPIC RIGHT COLECTOMY performed by Cesar Paniagua MD at [address]      JOINT REPLACEMENT Bilateral     knees    KNEE ARTHROSCOPY  10.1994    LIVER RESECTION N/A 12/13/2019    DIAGNOSTIC LAPAROSCOPY, LIVER ULTRASOUND performed by Bebeto Quintero MD at [address] [facility] LUMBAR LAMINECTOMY  10/28/2016    dr dalal==[facility]     TOTAL KNEE ARTHROPLASTY Right 8/23/11    TKR on right knee    TOTAL KNEE ARTHROPLASTY Left 2/21/12    Lt TKR---dr Yoly Wolf        Family History   Problem Relation Age of Onset    Diabetes Mother     High Blood Pressure Mother     Cancer Mother     Diabetes Father     High Blood Pressure Father     Cancer Father     Heart Disease Other     High Blood Pressure Other        Review of Systems   Constitutional: Positive for fatigue. Respiratory: Negative for apnea and choking. Cardiovascular: Negative for leg swelling. Genitourinary: Negative for frequency. Neurological: Negative for headaches. Objective:     Vitals:  Weight BMI Neck circumference    Wt Readings from Last 3 Encounters:   01/02/20 270 lb (122.5 kg)   12/30/19 275 lb (124.7 kg)   12/24/19 287 lb 11.2 oz (130.5 kg)    Body mass index is 39.87 kg/m². BP HR SaO2   BP Readings from Last 3 Encounters:   01/02/20 130/82   12/30/19 126/80   12/24/19 132/60    Pulse Readings from Last 3 Encounters:   01/02/20 69   12/30/19 72   12/24/19 67    SpO2 Readings from Last 3 Encounters:   01/02/20 97%   12/30/19 97%   12/24/19 95%          Themandibular molar Class :   [x]1 []2 []3      Mallampati I Airway Classification:   []1 []2 [x]3 []4      Physical Exam  Vitals signs and nursing note reviewed. HENT:      Head: Atraumatic. Mouth/Throat:      Mouth: Mucous membranes are moist.   Eyes:      Extraocular Movements: Extraocular movements intact. Neck:      Musculoskeletal: Normal range of motion and neck supple. Cardiovascular:      Rate and Rhythm: Normal rate and regular rhythm. Heart sounds: Normal heart sounds. Pulmonary:      Effort: Pulmonary effort is normal.      Breath sounds: Normal breath sounds. Musculoskeletal: Normal range of motion. General: No swelling. Skin:     General: Skin is warm. Neurological:      General: No focal deficit present. Psychiatric:         Mood and Affect: Mood normal.         Assessment:   Moderate Obstructive Sleep Apnea/Hypopnea Syndrome , lost weight and feels the BiPAP pressure is too high   Diagnosis Orders   1. RENETTA treated with BiPAP  Sleep Study with PAP Titration   2. Dependence on other enabling machines and devices  Sleep Study with PAP Titration     Plan:     I adjusted the PAP pressure to the BiPAP pressure of 13/9 cmwp with C-Check    I will order PAP supplies, mask, filters. ... Orders Placed This Encounter   Procedures    Sleep Study with PAP Titration       Return in about 6 months (around 7/2/2020) for Reveiwing BiPAP usage and compliance report and tro.     Fritz Fletcher MD  Medical Director - [facility]

## 2020-01-02 NOTE — PATIENT INSTRUCTIONS

## 2020-01-03 NOTE — PROGRESS NOTES
Diego Tony is here for postop evaluation. He is gradually improving since then. Minimal pain now. Tolerating diet and having normal BM's. No other complaints. O/E: Alert, oriented x 3, sitting comfortably with out any discomfort  No respiratory distress  Abdomen - soft, non-tender and no distension  Postop wound - healing well. Path - FINAL DIAGNOSIS:    Resection, portion of terminal ileum with right colon and appendix:     - Involved with invasive moderate to poorly differentiated colonic       adenocarcinoma with mass lesion 3 cm in greatest extent.     - Invasive adenocarcinoma extends through the muscle wall the specimen       into pericolonic adipose tissue and involves the sampled serosa.     - Invasive adenocarcinoma undermines/involves the adjacent terminal       ileum.     - Appendix with fibrous obliteration of the lumen of the appendix.     - Metastatic adenocarcinoma is identified in six of twelve sampled       lymph nodes ( 6/12) with 2 separate tumor deposits identified.     - See case comment and synoptic report. A/P: S/P Laparoscopic Right Colectomy 12/13/19, doing well  Liver lesions - will follow on serial imaging studies. Path as above reviewed with patient  Follow up with Dr Rey Vaughan  Follow up with Dr Dafne Reilly MD  Surgery Attending    I, Jomar Hull RN, am scribing for and in the presence of Dr Remigio Granados. Jomar Hull RN    I, Dr. Remigio Granados, personally performed the services described in this documentation as scribed by Jomar Hull RN in my presence, and it is both accurate and complete.      Remigio Granados MD  Surgery Attending

## 2020-01-06 ENCOUNTER — ANESTHESIA EVENT (OUTPATIENT)
Dept: OPERATING ROOM | Age: 75
End: 2020-01-06
Payer: MEDICARE

## 2020-01-07 ENCOUNTER — OFFICE VISIT (OUTPATIENT)
Dept: SURGERY | Age: 75
End: 2020-01-07

## 2020-01-07 VITALS
BODY MASS INDEX: 39.84 KG/M2 | HEART RATE: 72 BPM | HEIGHT: 69 IN | DIASTOLIC BLOOD PRESSURE: 55 MMHG | OXYGEN SATURATION: 98 % | TEMPERATURE: 97.4 F | WEIGHT: 269 LBS | SYSTOLIC BLOOD PRESSURE: 105 MMHG

## 2020-01-07 PROCEDURE — 99024 POSTOP FOLLOW-UP VISIT: CPT | Performed by: SURGERY

## 2020-01-08 ENCOUNTER — CARE COORDINATION (OUTPATIENT)
Dept: CASE MANAGEMENT | Age: 75
End: 2020-01-08

## 2020-01-09 ENCOUNTER — APPOINTMENT (OUTPATIENT)
Dept: GENERAL RADIOLOGY | Age: 75
End: 2020-01-09
Attending: SURGERY
Payer: MEDICARE

## 2020-01-09 ENCOUNTER — HOSPITAL ENCOUNTER (OUTPATIENT)
Age: 75
Setting detail: OUTPATIENT SURGERY
Discharge: HOME | End: 2020-01-09
Attending: SURGERY | Admitting: SURGERY
Payer: MEDICARE

## 2020-01-09 ENCOUNTER — ANESTHESIA (OUTPATIENT)
Dept: OPERATING ROOM | Age: 75
End: 2020-01-09
Payer: MEDICARE

## 2020-01-09 VITALS
OXYGEN SATURATION: 94 % | DIASTOLIC BLOOD PRESSURE: 58 MMHG | RESPIRATION RATE: 17 BRPM | SYSTOLIC BLOOD PRESSURE: 142 MMHG | TEMPERATURE: 96.8 F

## 2020-01-09 VITALS
BODY MASS INDEX: 41.18 KG/M2 | OXYGEN SATURATION: 93 % | WEIGHT: 278 LBS | HEIGHT: 69 IN | SYSTOLIC BLOOD PRESSURE: 150 MMHG | TEMPERATURE: 97.5 F | RESPIRATION RATE: 17 BRPM | HEART RATE: 79 BPM | DIASTOLIC BLOOD PRESSURE: 77 MMHG

## 2020-01-09 PROCEDURE — 77001 FLUOROGUIDE FOR VEIN DEVICE: CPT

## 2020-01-09 PROCEDURE — 7100000011 HC PHASE II RECOVERY - ADDTL 15 MIN: Performed by: SURGERY

## 2020-01-09 PROCEDURE — 3700000000 HC ANESTHESIA ATTENDED CARE: Performed by: SURGERY

## 2020-01-09 PROCEDURE — 3600000003 HC SURGERY LEVEL 3 BASE: Performed by: SURGERY

## 2020-01-09 PROCEDURE — 2580000003 HC RX 258: Performed by: NURSE ANESTHETIST, CERTIFIED REGISTERED

## 2020-01-09 PROCEDURE — 77001 FLUOROGUIDE FOR VEIN DEVICE: CPT | Performed by: SURGERY

## 2020-01-09 PROCEDURE — 6360000002 HC RX W HCPCS: Performed by: NURSE ANESTHETIST, CERTIFIED REGISTERED

## 2020-01-09 PROCEDURE — 7100000000 HC PACU RECOVERY - FIRST 15 MIN: Performed by: SURGERY

## 2020-01-09 PROCEDURE — 7100000001 HC PACU RECOVERY - ADDTL 15 MIN: Performed by: SURGERY

## 2020-01-09 PROCEDURE — 2580000003 HC RX 258: Performed by: ANESTHESIOLOGY

## 2020-01-09 PROCEDURE — 2500000003 HC RX 250 WO HCPCS: Performed by: NURSE ANESTHETIST, CERTIFIED REGISTERED

## 2020-01-09 PROCEDURE — 2709999900 HC NON-CHARGEABLE SUPPLY: Performed by: SURGERY

## 2020-01-09 PROCEDURE — C1769 GUIDE WIRE: HCPCS | Performed by: SURGERY

## 2020-01-09 PROCEDURE — 6360000002 HC RX W HCPCS: Performed by: SURGERY

## 2020-01-09 PROCEDURE — 36561 INSERT TUNNELED CV CATH: CPT | Performed by: SURGERY

## 2020-01-09 PROCEDURE — 7100000010 HC PHASE II RECOVERY - FIRST 15 MIN: Performed by: SURGERY

## 2020-01-09 PROCEDURE — 71045 X-RAY EXAM CHEST 1 VIEW: CPT

## 2020-01-09 PROCEDURE — 2500000003 HC RX 250 WO HCPCS: Performed by: SURGERY

## 2020-01-09 PROCEDURE — 76937 US GUIDE VASCULAR ACCESS: CPT | Performed by: SURGERY

## 2020-01-09 PROCEDURE — C1788 PORT, INDWELLING, IMP: HCPCS | Performed by: SURGERY

## 2020-01-09 PROCEDURE — 3700000001 HC ADD 15 MINUTES (ANESTHESIA): Performed by: SURGERY

## 2020-01-09 PROCEDURE — 3600000013 HC SURGERY LEVEL 3 ADDTL 15MIN: Performed by: SURGERY

## 2020-01-09 DEVICE — PORT INFUS SGL LUMN ATTCH POLYUR OPN END CATH 8FR POWERPRT: Type: IMPLANTABLE DEVICE | Site: SUBCLAVIAN | Status: FUNCTIONAL

## 2020-01-09 RX ORDER — MEPERIDINE HYDROCHLORIDE 25 MG/ML
12.5 INJECTION INTRAMUSCULAR; INTRAVENOUS; SUBCUTANEOUS EVERY 5 MIN PRN
Status: DISCONTINUED | OUTPATIENT
Start: 2020-01-09 | End: 2020-01-09 | Stop reason: HOSPADM

## 2020-01-09 RX ORDER — MIDAZOLAM HYDROCHLORIDE 1 MG/ML
INJECTION INTRAMUSCULAR; INTRAVENOUS PRN
Status: DISCONTINUED | OUTPATIENT
Start: 2020-01-09 | End: 2020-01-09 | Stop reason: SDUPTHER

## 2020-01-09 RX ORDER — HYDRALAZINE HYDROCHLORIDE 20 MG/ML
5 INJECTION INTRAMUSCULAR; INTRAVENOUS
Status: DISCONTINUED | OUTPATIENT
Start: 2020-01-09 | End: 2020-01-09 | Stop reason: HOSPADM

## 2020-01-09 RX ORDER — PROPOFOL 10 MG/ML
INJECTION, EMULSION INTRAVENOUS PRN
Status: DISCONTINUED | OUTPATIENT
Start: 2020-01-09 | End: 2020-01-09 | Stop reason: SDUPTHER

## 2020-01-09 RX ORDER — OXYCODONE HYDROCHLORIDE AND ACETAMINOPHEN 5; 325 MG/1; MG/1
1 TABLET ORAL PRN
Status: DISCONTINUED | OUTPATIENT
Start: 2020-01-09 | End: 2020-01-09 | Stop reason: HOSPADM

## 2020-01-09 RX ORDER — SODIUM CHLORIDE 0.9 % (FLUSH) 0.9 %
10 SYRINGE (ML) INJECTION PRN
Status: DISCONTINUED | OUTPATIENT
Start: 2020-01-09 | End: 2020-01-09 | Stop reason: HOSPADM

## 2020-01-09 RX ORDER — LIDOCAINE HYDROCHLORIDE 20 MG/ML
INJECTION, SOLUTION INFILTRATION; PERINEURAL PRN
Status: DISCONTINUED | OUTPATIENT
Start: 2020-01-09 | End: 2020-01-09 | Stop reason: SDUPTHER

## 2020-01-09 RX ORDER — MORPHINE SULFATE 2 MG/ML
1 INJECTION, SOLUTION INTRAMUSCULAR; INTRAVENOUS EVERY 5 MIN PRN
Status: DISCONTINUED | OUTPATIENT
Start: 2020-01-09 | End: 2020-01-09 | Stop reason: HOSPADM

## 2020-01-09 RX ORDER — LABETALOL HYDROCHLORIDE 5 MG/ML
5 INJECTION, SOLUTION INTRAVENOUS EVERY 10 MIN PRN
Status: DISCONTINUED | OUTPATIENT
Start: 2020-01-09 | End: 2020-01-09 | Stop reason: HOSPADM

## 2020-01-09 RX ORDER — ONDANSETRON 2 MG/ML
4 INJECTION INTRAMUSCULAR; INTRAVENOUS
Status: DISCONTINUED | OUTPATIENT
Start: 2020-01-09 | End: 2020-01-09 | Stop reason: HOSPADM

## 2020-01-09 RX ORDER — MORPHINE SULFATE 2 MG/ML
2 INJECTION, SOLUTION INTRAMUSCULAR; INTRAVENOUS EVERY 5 MIN PRN
Status: DISCONTINUED | OUTPATIENT
Start: 2020-01-09 | End: 2020-01-09 | Stop reason: HOSPADM

## 2020-01-09 RX ORDER — SODIUM CHLORIDE 9 MG/ML
INJECTION, SOLUTION INTRAVENOUS CONTINUOUS
Status: DISCONTINUED | OUTPATIENT
Start: 2020-01-09 | End: 2020-01-09 | Stop reason: HOSPADM

## 2020-01-09 RX ORDER — HEPARIN SODIUM (PORCINE) LOCK FLUSH IV SOLN 100 UNIT/ML 100 UNIT/ML
SOLUTION INTRAVENOUS
Status: COMPLETED | OUTPATIENT
Start: 2020-01-09 | End: 2020-01-09

## 2020-01-09 RX ORDER — SODIUM CHLORIDE, SODIUM LACTATE, POTASSIUM CHLORIDE, CALCIUM CHLORIDE 600; 310; 30; 20 MG/100ML; MG/100ML; MG/100ML; MG/100ML
INJECTION, SOLUTION INTRAVENOUS CONTINUOUS PRN
Status: DISCONTINUED | OUTPATIENT
Start: 2020-01-09 | End: 2020-01-09 | Stop reason: SDUPTHER

## 2020-01-09 RX ORDER — FENTANYL CITRATE 50 UG/ML
INJECTION, SOLUTION INTRAMUSCULAR; INTRAVENOUS PRN
Status: DISCONTINUED | OUTPATIENT
Start: 2020-01-09 | End: 2020-01-09 | Stop reason: SDUPTHER

## 2020-01-09 RX ORDER — OXYCODONE HYDROCHLORIDE AND ACETAMINOPHEN 5; 325 MG/1; MG/1
2 TABLET ORAL PRN
Status: DISCONTINUED | OUTPATIENT
Start: 2020-01-09 | End: 2020-01-09 | Stop reason: HOSPADM

## 2020-01-09 RX ORDER — KETAMINE HCL IN NACL, ISO-OSM 100MG/10ML
SYRINGE (ML) INJECTION PRN
Status: DISCONTINUED | OUTPATIENT
Start: 2020-01-09 | End: 2020-01-09 | Stop reason: SDUPTHER

## 2020-01-09 RX ORDER — SODIUM CHLORIDE 0.9 % (FLUSH) 0.9 %
10 SYRINGE (ML) INJECTION EVERY 12 HOURS SCHEDULED
Status: DISCONTINUED | OUTPATIENT
Start: 2020-01-09 | End: 2020-01-09 | Stop reason: HOSPADM

## 2020-01-09 RX ORDER — PROPOFOL 10 MG/ML
INJECTION, EMULSION INTRAVENOUS CONTINUOUS PRN
Status: DISCONTINUED | OUTPATIENT
Start: 2020-01-09 | End: 2020-01-09 | Stop reason: SDUPTHER

## 2020-01-09 RX ADMIN — SODIUM CHLORIDE, SODIUM LACTATE, POTASSIUM CHLORIDE, AND CALCIUM CHLORIDE: .6; .31; .03; .02 INJECTION, SOLUTION INTRAVENOUS at 16:36

## 2020-01-09 RX ADMIN — FENTANYL CITRATE 50 MCG: 50 INJECTION INTRAMUSCULAR; INTRAVENOUS at 16:23

## 2020-01-09 RX ADMIN — PROPOFOL 50 MG: 10 INJECTION, EMULSION INTRAVENOUS at 16:19

## 2020-01-09 RX ADMIN — Medication 30 MG: at 15:50

## 2020-01-09 RX ADMIN — PROPOFOL 30 MG: 10 INJECTION, EMULSION INTRAVENOUS at 15:50

## 2020-01-09 RX ADMIN — Medication 3 G: at 15:52

## 2020-01-09 RX ADMIN — PROPOFOL 140 MCG/KG/MIN: 10 INJECTION, EMULSION INTRAVENOUS at 15:50

## 2020-01-09 RX ADMIN — LIDOCAINE HYDROCHLORIDE 100 MG: 20 INJECTION, SOLUTION INFILTRATION; PERINEURAL at 15:50

## 2020-01-09 RX ADMIN — FENTANYL CITRATE 50 MCG: 50 INJECTION INTRAMUSCULAR; INTRAVENOUS at 16:45

## 2020-01-09 RX ADMIN — MIDAZOLAM 2 MG: 1 INJECTION INTRAMUSCULAR; INTRAVENOUS at 15:40

## 2020-01-09 RX ADMIN — SODIUM CHLORIDE: 9 INJECTION, SOLUTION INTRAVENOUS at 15:40

## 2020-01-09 ASSESSMENT — PULMONARY FUNCTION TESTS
PIF_VALUE: 0
PIF_VALUE: 1
PIF_VALUE: 0
PIF_VALUE: 0
PIF_VALUE: 1
PIF_VALUE: 0
PIF_VALUE: 2
PIF_VALUE: 0
PIF_VALUE: 1
PIF_VALUE: 0
PIF_VALUE: 1
PIF_VALUE: 0

## 2020-01-09 ASSESSMENT — PAIN SCALES - GENERAL
PAINLEVEL_OUTOF10: 0

## 2020-01-09 ASSESSMENT — ENCOUNTER SYMPTOMS: SHORTNESS OF BREATH: 0

## 2020-01-09 NOTE — ANESTHESIA PRE PROCEDURE
Nelida Casey MD at [facility] & [facility]  10/28/2016    dr dalal==[facility]     TOTAL KNEE ARTHROPLASTY Right 11    TKR on right knee    TOTAL KNEE ARTHROPLASTY Left 12    Lt TKR---dr [facility]        Social History:    Social History     Tobacco Use    Smoking status: Former Smoker     Packs/day: 0.25     Years: 12.00     Pack years: 3.00     Types: Cigarettes     Start date: 1960     Last attempt to quit: 1971     Years since quittin.4    Smokeless tobacco: Never Used   Substance Use Topics    Alcohol use: Yes     Comment: occ                                 Counseling given: Not Answered      Vital Signs (Current):   Vitals:    20 1129 20 1312   BP:  (!) 143/87   Pulse:  77   Resp:  14   Temp:  98.3 °F (36.8 °C)   TempSrc:  Temporal   SpO2:  94%   Weight: 278 lb (126.1 kg)    Height: 5' 9\" (1.753 m)                                               BP Readings from Last 3 Encounters:   20 (!) 143/87   20 (!) 105/55   20 130/82       NPO Status: Time of last liquid consumption: 1800                        Time of last solid consumption: 1800                        Date of last liquid consumption: 20                        Date of last solid food consumption: 20    BMI:   Wt Readings from Last 3 Encounters:   20 278 lb (126.1 kg)   20 269 lb (122 kg)   20 270 lb (122.5 kg)     Body mass index is 41.05 kg/m².     CBC:   Lab Results   Component Value Date    WBC 10.5 2019    RBC 4.88 2019    HGB 10.3 2019    HCT 33.1 2019    MCV 67.8 2019    RDW 19.4 2019     2019       CMP:   Lab Results   Component Value Date     2019    K 4.6 2019    K 3.7 2019     2019    CO2 22 2019    BUN 19 2019    CREATININE 0.8 2019    GFRAA >60 2019    GFRAA >60 2012    AGRATIO 1.0 2019    LABGLOM >60 2019    LABGLOM 88 with patient. Plan discussed with CRNA.                   Jayleen Echavarria MD   1/9/2020

## 2020-01-09 NOTE — H&P
PRE-OP/PRE-PROCEDURE H&P    Visit Date: 1/9/2020    History:     Clementine Albarado is a 75 y.o. male who presents today for procedure. See my/PCP/oncologist office notes for indications and details. Patient Active Problem List:     Primary localized osteoarthrosis, lower leg     Disorder of bone and cartilage     Arthropathy     Pain in joint, lower leg     Otalgia     Abdominal pain     Hemorrhage of rectum and anus     Essential and other specified forms of tremor     Pain in limb     Edema     Psychosexual dysfunction with inhibited sexual excitement     Pure hypercholesterolemia     Depressive disorder, not elsewhere classified     Obesity     Essential hypertension, benign     Osteoarthritis     Status post total knee replacement     Low back pain     Chest pain     Otalgia     Paroxysmal atrial fibrillation (HCC)     Pain of toe of right foot     URTI (acute upper respiratory infection)     Rectal bleeding     Cancer of ascending colon (HCC)     [facility]     S/P partial colectomy     Sepsis ([facility]         Current Facility-Administered Medications:     0.9 % sodium chloride infusion, , Intravenous, Continuous, Power Rogel MD    sodium chloride flush 0.9 % injection 10 mL, 10 mL, Intravenous, 2 times per day, Power Rogel MD    sodium chloride flush 0.9 % injection 10 mL, 10 mL, Intravenous, PRN, Power Rogel MD    ceFAZolin (ANCEF) 3 g in dextrose 5 % 100 mL IVPB, 3 g, Intravenous, Once, Peyton Pineda MD  Prior to Admission medications    Medication Sig Start Date End Date Taking? Authorizing Provider   lovastatin (MEVACOR) 20 MG tablet TAKE ONE TABLET BY MOUTH ONCE NIGHTLY 10/23/19  Yes Keri Wagner MD   allopurinol (ZYLOPRIM) 100 MG tablet TAKE ONE TABLET BY MOUTH DAILY 2/4/19  Yes Keri Wagner MD   warfarin (COUMADIN) 5 MG tablet Take 2.5 mg by mouth daily at 1800 Patient takes 2.5mg three times weekly on Monday, Wednesday and Friday.  He takes no warfarin at all on Normocephalic, atraumatic. Eyes: No scleral icterus. Vision intact grossly. ENT: Hearing grossly intact. No facial deformity. Neck: Normal range of motion. No tracheal deviation. Cardiovascular: Normal rate. No peripheral edema. Pulmonary/Chest: Effort normal. No respiratory distress. No wheezes. No use of accessory muscles. Musculoskeletal: No gross deformity. Neurological: Alert and oriented to person, place, and time. No gross deficits. Skin: Skin is dry. No rash noted. No pallor. Psychiatric: Normal mood and affect. Behavior normal. Oriented to person, place, and time. Abdomen: soft, NTTP, non distended    Recent labs and imaging reviewed as necessary. Assessment/Plan:       Proceed as planned for port placement for chemotherapy    Risks/benefits/alternatives of procedure/surgery discussed with patient and any present family members. All questions answered.     Electronically signed by Anthony Urban MD on 1/9/2020 at 2:00 PM

## 2020-01-09 NOTE — OP NOTE
OPERATIVE NOTE     Patient: Roland Velasquez  : 1945  MRN: [id number]    PREOPERATIVE DIAGNOSIS: Colon cancer metastatic to intra-abd lymph nodes     POSTOPERATIVE DIAGNOSIS: Same     PROCEDURE: Right internal jugular tunneled single lumen Port-A-Cath insertion with fluoroscopic and ultrasound guidance     SURGEON: Miguel Lewis MD    ANESTHESIA: MAC + Local     ESTIMATED BLOOD LOSS: Minimal     COMPLICATIONS: None    INDICATIONS: Port-A-Cath placement for chemotherapy as recommended by patient's oncologist.    Risks, benefits, and alternatives discussed with patient and any available family members in the preoperative area. Risks including but not limited to: bleeding, infection, hematoma, major vessel injury, malposition, need for re-operation or removal, and pneumothorax were discussed. All questions answered and patient consented to proceed. PROCEDURE DETAILS:  The patient was brought to the operating theater and placed in the supine position with arms padded and tucked at sides. A towel roll was placed between the scapulae. The patient's bilateral upper chest and neck was then prepped and draped in sterile fashion using chlorhexidine solution. A time-out was performed confirming the patient's identity and operative site. Antibiotics were confirmed to be infusing. SCDs were on and functioning. All safety points were followed per hospital protocol. Sedation was started by anesthesia provider. Patient was placed in Trendelenburg position. The left and right infraclavicular fossa was anesthetized with local anesthetic. The left and right subclavian veins attempted but unable to be cannulated due to patient body habitus. The ultrasound was prepped into the sterile field and used to examine anatomy. The right neck skin was injected with local anesthetic. The right internal jugular vein was cannulated under ultrasound guidance, with return of venous blood, and wire passed without difficulty.  Wire

## 2020-01-09 NOTE — ANESTHESIA POSTPROCEDURE EVALUATION
33.1 (L)            12/30/2019 11:12 AM        PLT                      670 (H)             12/30/2019 11:12 AM   RENAL  Lab Results       Component                Value               Date/Time                  NA                       144                 12/30/2019 11:12 AM        K                        4.6                 12/30/2019 11:12 AM        K                        3.7                 12/21/2019 07:53 AM        CL                       103                 12/30/2019 11:12 AM        CO2                      22                  12/30/2019 11:12 AM        BUN                      19                  12/30/2019 11:12 AM        CREATININE               0.8                 12/30/2019 11:12 AM        GLUCOSE                  99                  12/30/2019 11:12 AM   COAGS  Lab Results       Component                Value               Date/Time                  PROTIME                  14.9 (H)            12/30/2019 11:12 AM        INR                      1.28 (H)            12/30/2019 11:12 AM        APTT                     26.8                12/12/2019 12:21 PM     Intake & Output:  @87HMDF@    Nausea & Vomiting:  No    Level of Consciousness:  Awake    Pain Assessment:  Adequate analgesia    Anesthesia Complications:  No apparent anesthetic complications    SUMMARY      Vital signs stable  OK to discharge from Stage I post anesthesia care.   Care transferred from Anesthesiology department on discharge from perioperative area

## 2020-01-09 NOTE — PROGRESS NOTES
1649 pt arrived from OR, vitals stable on 2L NC. Pt alert, no pain reported. Chest incision clean, dry, and intact- skin glued. Lower right neck incision clean, dry, and intact- skin glued.
C-Difficile admission screening and protocol:     * Admitted with diarrhea? YES____    NO_x____     *Prior history of C-Diff. In last 3 months? YES____   NO__x___     *Antibiotic use in the past 6-8 weeks? NO______YES___x surgery/ infection___                 If yes which  ANTIBIOTIC AND REASON______     *Prior hospitalization or nursing home in the last month?  YES_x___   NO____ infection from surgery and hematoma
DC to home. Free of distress.   Electronically signed by Eamon Alba RN on 1/9/2020 at 6:52 PM
Patient alert and oriented, remains pain free, vitals stable on room air.
Postop chest xray complete.
toes      For your safety, please do not wear any jewelry or body piercing's on the day of surgery. All jewelry must be removed. If you have dentures, they will be removed before going to operating room. For your convenience, we will provide you with a container. If you wear contact lenses or glasses, they will be removed, please bring a case for them. If you have a living will and a durable power of  for healthcare, please bring in a copy. As part of our patient safety program to minimize surgical site infections, we ask you to do the following:    · Please notify your surgeon if you develop any illness between         now and the  day of your surgery. · This includes a cough, cold, fever, sore throat, nausea,         or vomiting, and diarrhea, etc.  ·  Please notify your surgeon if you experience dizziness, shortness         of breath or blurred vision between now and the time of your surgery. Do not shave your operative site 96 hours prior to surgery. For face and neck surgery, men may use an electric razor 48 hours   prior to surgery. You may shower the night before surgery or the morning of   your surgery with an antibacterial soap. You will need to bring a photo ID and insurance card    [facility] has an onsite pharmacy, would you like to utilize our pharmacy     If you will be staying overnight and use a C-pap machine, please bring   your C-pap to hospital     Our goal is to provide you with excellent care, therefore, visitors will be limited to two(2) in the room at a time so that we may focus on providing this care for you. Please contact pre-admission testing if you have any further questions. [facility] phone number:  [phone] PAT fax number:  [phone]  Please note these are generalized instructions for all surgical cases, you may be provided with more specific instructions according to your surgery.

## 2020-01-10 ENCOUNTER — TELEPHONE (OUTPATIENT)
Dept: SURGERY | Age: 75
End: 2020-01-10

## 2020-03-25 PROBLEM — H92.09 OTALGIA: Status: RESOLVED | Noted: 2020-03-25 | Resolved: 2020-03-24

## 2020-05-04 ENCOUNTER — HOSPITAL ENCOUNTER (OUTPATIENT)
Dept: CT IMAGING | Age: 75
Discharge: HOME | End: 2020-05-04
Payer: MEDICARE

## 2020-05-04 PROCEDURE — 74177 CT ABD & PELVIS W/CONTRAST: CPT

## 2020-05-04 PROCEDURE — 6360000004 HC RX CONTRAST MEDICATION: Performed by: INTERNAL MEDICINE

## 2020-05-04 RX ADMIN — IOPAMIDOL 75 ML: 755 INJECTION, SOLUTION INTRAVENOUS at 09:15

## 2020-05-04 RX ADMIN — IOHEXOL 50 ML: 240 INJECTION, SOLUTION INTRATHECAL; INTRAVASCULAR; INTRAVENOUS; ORAL at 09:15

## 2020-05-08 ENCOUNTER — HOSPITAL ENCOUNTER (OUTPATIENT)
Dept: MRI IMAGING | Age: 75
Discharge: HOME | End: 2020-05-08
Payer: MEDICARE

## 2020-05-08 PROCEDURE — A9581 GADOXETATE DISODIUM INJ: HCPCS | Performed by: INTERNAL MEDICINE

## 2020-05-08 PROCEDURE — 6360000004 HC RX CONTRAST MEDICATION: Performed by: INTERNAL MEDICINE

## 2020-05-08 PROCEDURE — 74183 MRI ABD W/O CNTR FLWD CNTR: CPT

## 2020-05-08 RX ADMIN — GADOXETATE DISODIUM 10 ML: 181.43 INJECTION, SOLUTION INTRAVENOUS at 14:35

## 2020-07-13 PROBLEM — F32.5 MAJOR DEPRESSION, SINGLE EPISODE, IN COMPLETE REMISSION (HCC): Status: ACTIVE | Noted: 2020-07-13

## 2020-08-03 ENCOUNTER — HOSPITAL ENCOUNTER (OUTPATIENT)
Dept: MRI IMAGING | Age: 75
Discharge: HOME | End: 2020-08-03
Payer: MEDICARE

## 2020-08-03 PROCEDURE — 74183 MRI ABD W/O CNTR FLWD CNTR: CPT

## 2020-08-03 PROCEDURE — 6360000004 HC RX CONTRAST MEDICATION: Performed by: INTERNAL MEDICINE

## 2020-08-03 PROCEDURE — A9581 GADOXETATE DISODIUM INJ: HCPCS | Performed by: INTERNAL MEDICINE

## 2020-08-03 RX ADMIN — GADOXETATE DISODIUM 10 ML: 181.43 INJECTION, SOLUTION INTRAVENOUS at 13:33

## 2020-08-06 ENCOUNTER — OFFICE VISIT (OUTPATIENT)
Dept: ORTHOPEDIC SURGERY | Age: 75
End: 2020-08-06
Payer: MEDICARE

## 2020-08-06 VITALS — TEMPERATURE: 98.6 F | BODY MASS INDEX: 37.47 KG/M2 | WEIGHT: 253 LBS | HEIGHT: 69 IN

## 2020-08-06 PROCEDURE — 99203 OFFICE O/P NEW LOW 30 MIN: CPT | Performed by: PHYSICIAN ASSISTANT

## 2020-08-06 RX ORDER — TIZANIDINE 4 MG/1
4 TABLET ORAL 3 TIMES DAILY PRN
Qty: 30 TABLET | Refills: 0 | Status: SHIPPED | OUTPATIENT
Start: 2020-08-06 | End: 2020-09-17 | Stop reason: SDUPTHER

## 2020-08-08 NOTE — PROGRESS NOTES
This dictation was done with [employer] dictation and may contain mechanical errors related to translation. The review of systems was currently provided by the patient and reviewed with the medical assistant at today's visit. Please see media. Subjective:  Camelia Dickerson is a 75 y.o. who is here as a new patient to [facility] orthopedics complaining of pain in both of his knees. He has had previous knee replacements dating back to 2012 and 2011 for both his left and his right knee. His left knee hurts more than his right and he states he has anywhere from a 5 out of 10 pain at rest to an 8 out of 10 pain with activity he is also complaining of pain in his left hip and his lower back but today he was sent for evaluation under x-ray of both of his knees including a standing AP lateral sunrise view of his left and his right knee.   I reviewed these x-rays with Dr. Wenceslao Abreu and they were satisfactory x-rays of the total knee replacements      Patient Active Problem List   Diagnosis    Primary localized osteoarthrosis, lower leg    Disorder of bone and cartilage    Arthropathy    Pain in joint, lower leg    Abdominal pain    Hemorrhage of rectum and anus    Essential and other specified forms of tremor    Pain in limb    Edema    Psychosexual dysfunction with inhibited sexual excitement    Pure hypercholesterolemia    Depressive disorder, not elsewhere classified    Obesity    Essential hypertension, benign    Osteoarthritis    Status post total knee replacement    Low back pain    Chest pain    Otalgia    Paroxysmal atrial fibrillation (HCC)    Pain of toe of right foot    URTI (acute upper respiratory infection)    Rectal bleeding    Cancer of ascending colon ([address]    Colon cancer ([address]    S/P partial colectomy    Sepsis ([address]    Colon cancer metastasized to intra-abdominal lymph node ([address]    Liver mass    Major depression, single episode, in complete remission ([address]    Body mass index (BMI) 40.0-44.9, adult [facility])           Current Outpatient Medications on File Prior to Visit   Medication Sig Dispense Refill    atorvastatin (LIPITOR) 40 MG tablet Take 1 tablet by mouth daily 90 tablet 4    sildenafil (VIAGRA) 50 MG tablet Take 1 tablet by mouth daily as needed for Erectile Dysfunction 10 tablet 0    lovastatin (MEVACOR) 20 MG tablet TAKE ONE TABLET BY MOUTH ONCE NIGHTLY 90 tablet 3    allopurinol (ZYLOPRIM) 100 MG tablet TAKE ONE TABLET BY MOUTH DAILY 90 tablet 3    lisinopril (PRINIVIL;ZESTRIL) 40 MG tablet Take 20 mg by mouth daily  30 tablet     warfarin (COUMADIN) 5 MG tablet Take 2.5 mg by mouth daily at 1800 Patient takes 2.5mg three times weekly on Monday, Wednesday and Friday. He takes no warfarin at all on other days of the week.  flecainide (TAMBOCOR) 100 MG tablet Take 100 mg by mouth 2 times daily        No current facility-administered medications on file prior to visit. Objective:   Temperature 98.6 °F (37 °C), height 5' 9\" (1.753 m), weight 253 lb (114.8 kg). On examination this is a pleasant 79-year-old gentleman in no acute distress he is alert and oriented x3 he has equivalent leg length with 0 to 130 degrees of motion both of his knees. He does have some mild swelling in the anterior aspect and some tightness to his hamstring. He does have a positive straight leg raise at 40 degrees on the left leg. He does have pain with internal and external rotation of the left hip that radiates into the groin area consistent with possible osteoarthritis of the hip. There are no neurological deficits in either the left of the right leg with good dorsiflexion plantarflexion strength. He has good symmetric motion through the right hip without pain. Neuro exam grossly intact both lower extremities. Intact sensation to light touch. Motor exam 4+ to 5/5 in all major motor groups. Negative Miranda's sign.     Skin is warm, dry and intact with out erythema or significant increased temperature around the knee joint(s). There are no cutaneous lesions or lymphadenopathy present. X-RAYS:  X-rays taken the office today show excellent alignment sizing and fit of both the left and the right prosthesis with no wear on the spacer appropriate patella alignment. No fractures or other bone abnormalities are noted and this was shown to the patient      Assessment:  Stable bilateral total knee replacements with some quad atrophy hamstring tightness lumbar radiculopathy and possible left hip osteoarthritis    Plan:  During today's visit, there was approximately 30 minutes of face-to-face discussion in regards to the patient's current condition and treatment options. More than 50 % of the time was counseling and coordination of care. We talked about all conditions and I believe that some of the pain in his knee may be a result of referred pain from the back of the hip.   For better evaluation willing to give 4 weeks of physical therapy to work on his quad strength and see if there is more symptoms rising from the hip and back etc. he will follow-up with us at that point and further treatment will based on how much progression he gets      PROCEDURE NOTE:   Schedule physical therapy and a muscle relaxer      They will schedule a follow up in 4 weeks

## 2020-08-10 ENCOUNTER — HOSPITAL ENCOUNTER (OUTPATIENT)
Dept: PHYSICAL THERAPY | Age: 75
Setting detail: THERAPIES SERIES
Discharge: HOME | End: 2020-08-10
Payer: MEDICARE

## 2020-08-10 PROCEDURE — 97530 THERAPEUTIC ACTIVITIES: CPT

## 2020-08-10 PROCEDURE — 97162 PT EVAL MOD COMPLEX 30 MIN: CPT

## 2020-08-10 PROCEDURE — 97110 THERAPEUTIC EXERCISES: CPT

## 2020-08-10 NOTE — PLAN OF CARE
Outpatient Physical Therapy  [x] [facility]    Phone: [phone]   Fax: [phone]   [] [facility]  Phone: [phone]              Fax: [phone]  [] [facility]   Phone: [phone]   Fax: [phone]     To: Referring Practitioner: Thang      Patient: Nelly Yusuf   : 1945   MRN: [id number]  Evaluation Date: 8/10/2020      Diagnosis Information:  · Diagnosis: bilateral knee pain  ·  Decreased functional mobility 2/2 general LE pain/LBP   ·       Physical Therapy Certification  Dear Kendall Robert  The following patient has been evaluated for physical therapy services and for therapy to continue, Medicare requires monthly physician review of the treatment plan. Please review the attached evaluation and/or summary of the patient's plan of care, and verify that you agree therapy should continue by signing the attached document and sending it back to our office. Plan of Care/Treatment to date:  [x] Therapeutic Exercise    [x] Modalities:  [x] Therapeutic Activity     [] Ultrasound  [] Electrical Stimulation  [x] Gait Training      [] Cervical Traction [] Lumbar Traction  [] Neuromuscular Re-education    [] Cold/hotpack [] Iontophoresis   [x] Instruction in HEP     Other:  [x] Manual Therapy      []             [] Aquatic Therapy      []           ? Frequency/Duration:  # Days per week: [] 1 day # Weeks: [] 1 week [] 5 weeks     [x] 2 days? [] 2 weeks [] 6 weeks     [] 3 days   [] 3 weeks [] 7 weeks     [] 4 days   [] 4 weeks [x] 8 weeks    Rehab Potential: [] Excellent [x] Good [] Fair  [] Poor       Electronically signed by:  Cristopher Navas, PT  [id number]      If you have any questions or concerns, please don't hesitate to call.   Thank you for your referral.      Physician Signature:________________________________Date:__________________  By signing above, therapists plan is approved by physician

## 2020-08-10 NOTE — FLOWSHEET NOTE
SLR Add as yun Watch left hip   Clamshell add         Balance Tandem R/L  Airex             Other Therapeutic Activities:  Pt was educated on PT POC, Diagnosis, Prognosis, pathomechanics as well as frequency and duration of scheduling future physical therapy appointments. Time was also taken on this day to answer all patient questions and participation in PT. Reviewed appointment policy in detail with patient and patient verbalized understanding. Home Exercise Program:  Patient instructed in the following for HEP as noted above. Patient verbalized/demonstrated understanding . Treatment/Activity Tolerance:  [] Patient tolerated treatment well [x] Patient limited by fatigue  [x] Patient limited by pain  [] Patient limited by other medical complications  [] Other:     Functional Progress see subj    Prognosis: [x] Good [] Fair  [] Poor    Patient Requires Follow-up: [x] Yes  [] No    Plan:   [] Continue per plan of care [] Alter current plan (see comments)  [x] Plan of care initiated [] Hold pending MD visit [] Discharge    Plan for Next Session:  Add above as stated      Goals:    Short term goals  Time Frame for Short term goals: Discharge 8 weeks  Short term goal 1: Decrease pain to minimal to allow normal ADL/gait  Short term goal 2: Normalize LE flexibiltiy for increased ease with donning shoes/dressing  Short term goal 3: Normalize LE strength to allow normal gait/steps without limitation  Short term goal 4: Maintain good core stability during light ADL  Short term goal 5: Balance/Lumbar ROM goals to be set when fully evaluated              Functional Assessment:  LEFs  39    Charges: Therapeutic Exercise:  [x] (64544) Provided verbal/tactile cueing for activities to restore or maintain strength, flexibility, endurance, ROM for improvements with self-care, mobility, lifting and ambulation.     Neuromuscular Re-Education  [] (10524) Provided verbal/tactile cueing for activities to restore or maintain

## 2020-08-10 NOTE — PROGRESS NOTES
Physical Therapy  Initial Assessment  Date: 8/10/2020  Patient Name: Luc Ruiz  MRN: [id number]  : 1945     Treatment Diagnosis: Decreased functional mobility 2/2 weakness/pain    Restrictions  Position Activity Restriction  Other position/activity restrictions: medium fall risk 2/2 LE pain/weakness, no history of falls. Subjective   General  Chart Reviewed: Yes  Additional Pertinent Hx: HTN, HBP, colon CA, CAD, arthritis, sleep apneagout, cpap, afib,  lida TKR, lumbar laminectomy  Referring Practitioner: Thang  Referral Date : 20  Diagnosis: bilateral knee pain, left hip pain, back pain  PT Visit Information  Onset Date: 20  PT Insurance Information: [employer]  Subjective  Subjective: Recent history of bilateral knee pain L>R,  5/10-8/10 with activity, left hip pain and back pain as well. Noted that when chemo was done at the end of , that he didn't have the strength in his legs that he did before. Limited to standing or walking about 5 minutes, pain in both knees, left moresoe than right, occasional warmth left knee,  effortful to move in bed, sit to stand, and walk. Vision/Hearing  Vision  Vision: Within Functional Limits  Hearing  Hearing: Within functional limits      Social/Functional History  Social/Functional History  Lives With: Spouse  Type of Home: House  Home Layout: Able to Live on Main level with bedroom/bathroom  Occupation: Retired  Leisure & Hobbies: finishing the basement    Objective  Observation/Palpation  Palpation: no specific TTP noted lumbar region/hip/knees.   Observation: effortful transitional movements    AROM RLE (degrees)  RLE AROM: WFL  AROM LLE (degrees)  LLE AROM : WFL    Strength RLE  Strength RLE: WFL  Strength LLE  Strength LLE: WFL     Additional Measures  Flexibility: Mod ltd flexibility, Right -40, left -45  Special Tests: negative crams L/R  Sensation  Overall Sensation Status: Impaired(NT lida hands post chemo)  Bed mobility  Comment: effortful, increased time to complete task  Transfers  Sit to Stand: Modified independent(uses arms of chair for leverage)  Stand to sit: Modified independent       Ambulation  Ambulation?: Yes  Ambulation 1  Gait Deviations: Increased JUAN DANIEL; Slow Pooja        Assessment   Conditions Requiring Skilled Therapeutic Intervention  Assessment: Pt with new onset weakness/fatigue post chemo. Demonstrates issues with general flexibilty/strength/endurance and will benefit from continued therapies to maximize mobility/gait and function  Treatment Diagnosis: Decreased functional mobility 2/2 weakness/pain  Prognosis: Good  Decision Making: Medium Complexity  Barriers to Learning: none noted  REQUIRES PT FOLLOW UP: Yes  Treatment Initiated : POC  Activity Tolerance  Activity Tolerance: Patient limited by endurance; Patient limited by pain         Plan   Plan  Times per week: 2x/week x 8 weeks  Current Treatment Recommendations: Strengthening, Home Exercise Program, ROM, Functional Mobility Training, Transfer Training, Gait Training, Pain Management, Safety Education & Training      OutComes Score  LEFS Total Score: 39 (08/10/20 1111)          Goals  Short term goals  Time Frame for Short term goals: Discharge 8 weeks  Short term goal 1: Decrease pain to minimal to allow normal ADL/gait  Short term goal 2: Normalize LE flexibiltiy for increased ease with donning shoes/dressing  Short term goal 3: Normalize LE strength to allow normal gait/steps without limitation  Short term goal 4: Maintain good core stability during light ADL  Short term goal 5: Balance/Lumbar ROM goals to be set when fully evaluated  Patient Goals   Patient goals : Get rid of the pain and feel better          Kevin Sommers, [address] [facility] [address]

## 2020-08-13 ENCOUNTER — HOSPITAL ENCOUNTER (OUTPATIENT)
Dept: PHYSICAL THERAPY | Age: 75
Setting detail: THERAPIES SERIES
Discharge: HOME | End: 2020-08-13
Payer: MEDICARE

## 2020-08-13 PROCEDURE — 97110 THERAPEUTIC EXERCISES: CPT

## 2020-08-13 NOTE — FLOWSHEET NOTE
Physical Therapy Daily Treatment Note  Date:  2020    Patient Name:  Mary Kay Singer    :  1945  MRN: [id number]    Restrictions/Precautions: Position Activity Restriction  Other position/activity restrictions: medium fall risk 2/2 LE pain/weakness, no history of falls. Pertinent Medical History: Additional Pertinent Hx: HTN, HBP, colon CA, CAD, arthritis, sleep apneagout, cpap, afib,  lida TKR, lumbar laminectomy    Medical/Treatment Diagnosis Information:  · Diagnosis: bilateral knee pain, left hip pain, back pain  · Treatment Diagnosis: Decreased functional mobility 2/2 weakness/pain    Insurance/Certification information:  PT Insurance Information: [employer]  Physician Information:  Referring Practitioner: [address] [place] [address] signed (Y/N):  sent on eval date    Visit# / total visits:    Pain level: 3/10      History of Injury:   Recent history of bilateral knee pain L>R,  5/10-8/10 with activity, left hip pain and back pain as well. Noted that when chemo was done at the end of , that he didn't have the strength in his legs that he did before, and had pain in both knees. Subjective: Pain in B knees today. Limited to standing or walking about 5 minutes, pain in both knees, left moreso than right, occasional warmth left knee,  effortful to move in bed, sit to stand, and walk.   Pain 5-6/10 lida knees primarily    Objective:      LE ROM  WFL,   Lumbar ROM - to be assessed  Strength - hip flexion  4-/5, knee extension 4/5, flexion  4/5, ankles WFL      Exercise/Equipment Resistance/Repetitions Other comments     Painful knees, left hip , back, decreased strength/ endurance post Chemo for colon CA   Nustep L0 x 5 min     HSS step 30 sec x 2    GSS incline  Hip flexor stretch step 2 x 30 sec   mild difficulty with position - no stretch felt          Standing abd  Standing flexion  Standing extension X 10 L/R   X 10 L/R  add    fitter add         Leg Press add    Knee ext add              SKTC 10 sec x 5    LTR 10         Glut sets 3 sec x 10    Add sets 10 x 5 sec          SLR Add as yun Watch left hip   Clamshell add         Balance Tandem R/L  Airex             Other Therapeutic Activities:  Pt was educated on PT POC, Diagnosis, Prognosis, pathomechanics as well as frequency and duration of scheduling future physical therapy appointments. Time was also taken on this day to answer all patient questions and participation in PT. Reviewed appointment policy in detail with patient and patient verbalized understanding. Home Exercise Program:  Patient instructed in the following for HEP as noted above. Patient verbalized/demonstrated understanding . Treatment/Activity Tolerance:  [] Patient tolerated treatment well [x] Patient limited by fatigue  [x] Patient limited by pain  [] Patient limited by other medical complications  [] Other:     Functional Progress see subj    Prognosis: [x] Good [] Fair  [] Poor    Patient Requires Follow-up: [x] Yes  [] No    Plan:   [] Continue per plan of care [] Alter current plan (see comments)  [x] Plan of care initiated [] Hold pending MD visit [] Discharge    Plan for Next Session:  Add above as stated      Goals:    Short term goals  Time Frame for Short term goals: Discharge 8 weeks  Short term goal 1: Decrease pain to minimal to allow normal ADL/gait  Short term goal 2: Normalize LE flexibiltiy for increased ease with donning shoes/dressing  Short term goal 3: Normalize LE strength to allow normal gait/steps without limitation  Short term goal 4: Maintain good core stability during light ADL  Short term goal 5: Balance/Lumbar ROM goals to be set when fully evaluated              Functional Assessment:  LEFs  39    Charges:   Therapeutic Exercise:  [x] (43680) Provided verbal/tactile cueing for activities to restore or maintain strength, flexibility, endurance, ROM for improvements with self-care, mobility, lifting and ambulation. Neuromuscular Re-Education  [] (07828) Provided verbal/tactile cueing for activities to restore or maintain balance, coordination, kinesthetic sense, posture, motor skill, proprioception for self-care, mobility, lifting, and ambulation. Therapeutic Activities:    [x] (24736) Provided verbal/tactile cueing to address functional limitations related to loss of mobility, strength, balance, and coordination. Gait Training:  [] (87976) Provided training and instruction to the patient for proper postural muscle recruitment and positioning with ambulation re-education     Home Exercise Program:    [x] (46868) Reviewed/Progressed HEP activities related to strengthening, flexibility, endurance, ROM for functional self-care, mobility, lifting and ambulation   [] (02818) Reviewed/Progressed HEP activities related to improving balance, coordination, kinesthetic sense, posture, motor skill, proprioception for self-care, mobility, lifting, and ambulation      Manual Treatments:  MFR / STM / Oscillations-Mobs:  G-I, II, III, IV / Manipulation / MLD  [x] (65498) Provided manual therapy to mobilize  soft tissue/joints/fluid for the purpose of modulating pain, promoting relaxation, increasing ROM, reducing/eliminating soft tissue swelling/inflammation/restriction, improving soft tissue extensibility and allowing for proper ROM for normal function with self- care, mobility, lifting and ambulation.       Timed Code Treatment Minutes: 30   Total Treatment Minutes: 30     [] EVAL (LOW) 94203   [] EVAL (MOD) 59461   [] EVAL (HIGH) 76658   [] RE-EVAL   [x] TE (21284) x 2    [] Aquatic (16415) x  [] NMR (87989)   x  [] Aquatic Group (96905) x  [] Manual (71841) x    [] Ultrasound (45816) x  [] TA (20831) x   [] Mech Traction (92217)  [] Ionto (91651)           [] ES (un) (57558):   [] Vasopump (46234) [] Other:          Electronically signed by:  Cherelle Noel, [address]

## 2020-08-17 ENCOUNTER — HOSPITAL ENCOUNTER (OUTPATIENT)
Dept: PHYSICAL THERAPY | Age: 75
Setting detail: THERAPIES SERIES
Discharge: HOME | End: 2020-08-17
Payer: MEDICARE

## 2020-08-17 PROCEDURE — 97110 THERAPEUTIC EXERCISES: CPT

## 2020-08-17 NOTE — FLOWSHEET NOTE
Physical Therapy Daily Treatment Note  Date:  2020    Patient Name:  Elizabeth Collins    :  1945  MRN: [id number]    Restrictions/Precautions: Position Activity Restriction  Other position/activity restrictions: medium fall risk 2/2 LE pain/weakness, no history of falls. Pertinent Medical History: Additional Pertinent Hx: HTN, HBP, colon CA, CAD, arthritis, sleep apneagout, cpap, afib,  lida TKR, lumbar laminectomy    Medical/Treatment Diagnosis Information:  · Diagnosis: bilateral knee pain, left hip pain, back pain  · Treatment Diagnosis: Decreased functional mobility 2/2 weakness/pain    Insurance/Certification information:  PT Insurance Information: [employer] Medicare  Physician Information:  Referring Practitioner: [address] [place] [address] signed (Y/N):  sent on eval date    Visit# / total visits: 3/12  Pain level: 3/10      History of Injury:   Recent history of bilateral knee pain L>R,  5/10-8/10 with activity, left hip pain and back pain as well. Noted that when chemo was done at the end of , that he didn't have the strength in his legs that he did before, and had pain in both knees. Subjective: Pain in B knees today. Limited to standing or walking about 5 minutes, pain in both knees, left moreso than right, occasional warmth left knee,  effortful to move in bed, sit to stand, and walk.   Pain 5-6/10 lida knees primarily    Objective:      LE ROM  WFL,   Lumbar ROM - to be assessed  Strength - hip flexion  4-/5, knee extension 4/5, flexion  4/5, ankles WFL      Exercise/Equipment Resistance/Repetitions Other comments     Painful knees, left hip , back, decreased strength/ endurance post Chemo for colon CA   Nustep L1 x 5 min   Seat 9 UE 9  6 min   HSS step 30 sec x 2    GSS incline  HR/TR   2 x 30 sec   add      Balance     Tandem 30 sec/R/L    Airex n reno  ADD                   Standing abd  Standing flexion  Standing extension  Standing knee flexion X 10 L/R   X 10 L/R  X 10 L/R  Add      fitter F/B one thin x 10 each  S/s  ADD         Leg Press 30# 2 x 10     Knee ext (green) Add               SKTC 10 sec x 5    LTR 10         Glut sets 3 sec x 10    Add sets  Tband abd hooklying 10 x 5 sec  Orange ball  Orange x 10         SLR X 10 R/L Difficulty with left   Clamshell add         MHP Seated  Large one across back 12 min   CP velcro'd to left knee 12 min                  HEP SLR, glut set, add set. Other Therapeutic Activities:  Pt was educated on PT POC, Diagnosis, Prognosis, pathomechanics as well as frequency and duration of scheduling future physical therapy appointments. Time was also taken on this day to answer all patient questions and participation in PT. Reviewed appointment policy in detail with patient and patient verbalized understanding. Home Exercise Program:  Patient instructed in the following for HEP as noted above. Patient verbalized/demonstrated understanding .     Treatment/Activity Tolerance:  [] Patient tolerated treatment well [x] Patient limited by fatigue  [x] Patient limited by pain  [] Patient limited by other medical complications  [] Other:     Functional Progress see subj    Prognosis: [x] Good [] Fair  [] Poor    Patient Requires Follow-up: [x] Yes  [] No    Plan:   [] Continue per plan of care [] Alter current plan (see comments)  [x] Plan of care initiated [] Hold pending MD visit [] Discharge    Plan for Next Session:  Add above as stated      Goals:    Short term goals  Time Frame for Short term goals: Discharge 8 weeks  Short term goal 1: Decrease pain to minimal to allow normal ADL/gait  Short term goal 2: Normalize LE flexibiltiy for increased ease with donning shoes/dressing  Short term goal 3: Normalize LE strength to allow normal gait/steps without limitation  Short term goal 4: Maintain good core stability during light ADL  Short term goal 5: Balance/Lumbar ROM goals to be set when fully evaluated              Functional Assessment: LEFs  39    Charges: Therapeutic Exercise:  [x] (25652) Provided verbal/tactile cueing for activities to restore or maintain strength, flexibility, endurance, ROM for improvements with self-care, mobility, lifting and ambulation. Neuromuscular Re-Education  [] (61839) Provided verbal/tactile cueing for activities to restore or maintain balance, coordination, kinesthetic sense, posture, motor skill, proprioception for self-care, mobility, lifting, and ambulation. Therapeutic Activities:    [x] (62290) Provided verbal/tactile cueing to address functional limitations related to loss of mobility, strength, balance, and coordination. Gait Training:  [] (76467) Provided training and instruction to the patient for proper postural muscle recruitment and positioning with ambulation re-education     Home Exercise Program:    [x] (45927) Reviewed/Progressed HEP activities related to strengthening, flexibility, endurance, ROM for functional self-care, mobility, lifting and ambulation   [] (75562) Reviewed/Progressed HEP activities related to improving balance, coordination, kinesthetic sense, posture, motor skill, proprioception for self-care, mobility, lifting, and ambulation      Manual Treatments:  MFR / STM / Oscillations-Mobs:  G-I, II, III, IV / Manipulation / MLD  [x] (19028) Provided manual therapy to mobilize  soft tissue/joints/fluid for the purpose of modulating pain, promoting relaxation, increasing ROM, reducing/eliminating soft tissue swelling/inflammation/restriction, improving soft tissue extensibility and allowing for proper ROM for normal function with self- care, mobility, lifting and ambulation.       Timed Code Treatment Minutes: 40   Total Treatment Minutes: 52     [] EVAL (LOW) 88674   [] EVAL (MOD) 99603   [] EVAL (HIGH) 73868   [] RE-EVAL   [x] TE (58070) x 3    [] Aquatic (59562) x  [] NMR (03427)   x  [] Aquatic Group (93871) x  [] Manual (73076) x    [] Ultrasound (93079) x  [] TA (32905)

## 2020-08-20 ENCOUNTER — HOSPITAL ENCOUNTER (OUTPATIENT)
Dept: PHYSICAL THERAPY | Age: 75
Setting detail: THERAPIES SERIES
Discharge: HOME | End: 2020-08-20
Payer: MEDICARE

## 2020-08-20 PROCEDURE — 97110 THERAPEUTIC EXERCISES: CPT

## 2020-08-24 ENCOUNTER — HOSPITAL ENCOUNTER (OUTPATIENT)
Dept: PHYSICAL THERAPY | Age: 75
Setting detail: THERAPIES SERIES
Discharge: HOME | End: 2020-08-24
Payer: MEDICARE

## 2020-08-24 PROCEDURE — 97110 THERAPEUTIC EXERCISES: CPT

## 2020-08-24 NOTE — FLOWSHEET NOTE
Physical Therapy Daily Treatment Note  Date:  2020    Patient Name:  Luellen Scheuermann    :  1945  MRN: [id number]    Restrictions/Precautions: Position Activity Restriction  Other position/activity restrictions: medium fall risk 2/2 LE pain/weakness, no history of falls. Pertinent Medical History: Additional Pertinent Hx: HTN, HBP, colon CA, CAD, arthritis, sleep apneagout, cpap, afib,  lida TKR, lumbar laminectomy    Medical/Treatment Diagnosis Information:  · Diagnosis: bilateral knee pain, left hip pain, back pain  · Treatment Diagnosis: Decreased functional mobility 2/2 weakness/pain    Insurance/Certification information:  PT Insurance Information: A[employer]na Medicare  Physician Information:  Referring Practitioner: [address] [place] [address] signed (Y/N):  sent on eval date    Visit# / total visits:   Pain level: 3/10      History of Injury:   Recent history of bilateral knee pain L>R,  5/10-8/10 with activity, left hip pain and back pain as well. Noted that when chemo was done at the end of , that he didn't have the strength in his legs that he did before, and had pain in both knees. Subjective: Low back is bothering him more today (3-410), and there is always some pain in his knees. Limited to standing or walking about 5 minutes, pain in both knees, left moreso than right, occasional warmth left knee,  effortful to move in bed, sit to stand, and walk.   Pain 5-6/10 lida knees primarily    Objective:      LE ROM  WFL,   Lumbar ROM - to be assessed  Strength - hip flexion  4-/5, knee extension 4/5, flexion  4/5, ankles WFL      Exercise/Equipment Resistance/Repetitions Other comments     Painful knees, left hip , back, decreased strength/ endurance post Chemo for colon CA   Nustep L1 x 6 min   Seat 9 UE 9    HSS step 30 sec x 2    GSS incline  HR/TR   2 x 30 sec   X 15 each      Balance     Tandem 30 sec/R/L    Airex n reno  X 30 sec          Standing abd  Standing flexion  Standing extension  Standing knee flexion  1#  X 10 L/R   1# X 10 L/R  1# X 10  L/R  1# X 10 L/R       fitter F/B one thin x 10 each  S/s  One thin x 10 ea    Step ups  Steps lat  Steps down     6\" x 10 R/L  Add  add    Leg Press 50# 2 x 10     Knee ext   Knee flexion 15# 2 x 10 B   add         ll bars  Tandem gait  reebok   4 laps                        SKTC 10 sec x 5    LTR 10         Glut sets 3 sec x 10    Add sets  Tband abd hooklying 10 x 5 sec  Orange ball  Orange x 10   Orange TB issued for hep 8/20        SLR X 10 R/L Difficulty with left   Clamshell add         MHP Seated  Large one across back 12 min   CP velcro'd to left knee 12 min                  HEP SLR, glut set, add set. Other Therapeutic Activities:  Pt was educated on PT POC, Diagnosis, Prognosis, pathomechanics as well as frequency and duration of scheduling future physical therapy appointments. Time was also taken on this day to answer all patient questions and participation in PT. Reviewed appointment policy in detail with patient and patient verbalized understanding. Home Exercise Program:  Patient instructed in the following for HEP as noted above. Patient verbalized/demonstrated understanding .     Treatment/Activity Tolerance:  [x] Patient tolerated treatment well [x] Patient limited by fatigue  [] Patient limited by pain  [] Patient limited by other medical complications  [] Other:     Functional Progress see subj    Prognosis: [x] Good [] Fair  [] Poor    Patient Requires Follow-up: [x] Yes  [] No    Plan:   [x] Continue per plan of care [] Alter current plan (see comments)  [] Plan of care initiated [] Hold pending MD visit [] Discharge    Plan for Next Session:  Add above as stated      Goals:    Short term goals  Time Frame for Short term goals: Discharge 8 weeks  Short term goal 1: Decrease pain to minimal to allow normal ADL/gait  Short term goal 2: Normalize LE flexibiltiy for increased ease with donning shoes/dressing  Short term goal 3: Normalize LE strength to allow normal gait/steps without limitation  Short term goal 4: Maintain good core stability during light ADL  Short term goal 5: Balance/Lumbar ROM goals to be set when fully evaluated              Functional Assessment:  LEFs  39    Charges: Therapeutic Exercise:  [x] (04378) Provided verbal/tactile cueing for activities to restore or maintain strength, flexibility, endurance, ROM for improvements with self-care, mobility, lifting and ambulation. Neuromuscular Re-Education  [] (66998) Provided verbal/tactile cueing for activities to restore or maintain balance, coordination, kinesthetic sense, posture, motor skill, proprioception for self-care, mobility, lifting, and ambulation. Therapeutic Activities:    [x] (03686) Provided verbal/tactile cueing to address functional limitations related to loss of mobility, strength, balance, and coordination. Gait Training:  [] (93110) Provided training and instruction to the patient for proper postural muscle recruitment and positioning with ambulation re-education     Home Exercise Program:    [x] (13146) Reviewed/Progressed HEP activities related to strengthening, flexibility, endurance, ROM for functional self-care, mobility, lifting and ambulation   [] (80642) Reviewed/Progressed HEP activities related to improving balance, coordination, kinesthetic sense, posture, motor skill, proprioception for self-care, mobility, lifting, and ambulation      Manual Treatments:  MFR / STM / Oscillations-Mobs:  G-I, II, III, IV / Manipulation / MLD  [x] (96696) Provided manual therapy to mobilize  soft tissue/joints/fluid for the purpose of modulating pain, promoting relaxation, increasing ROM, reducing/eliminating soft tissue swelling/inflammation/restriction, improving soft tissue extensibility and allowing for proper ROM for normal function with self- care, mobility, lifting and ambulation.       Timed Code Treatment Minutes: 40   Total Treatment Minutes: 55     [] EVAL (LOW) 35182   [] EVAL (MOD) 12716   [] EVAL (HIGH) 53204   [] RE-EVAL   [x] TE (04669) x 3    [] Aquatic (41811) x  [] NMR (71906)   x  [] Aquatic Group (61233) x  [] Manual (49849) x    [] Ultrasound (28031) x  [] TA (54680) x                      Mech Traction (83540)  [] Ionto (50436)           [] ES (un) (65652):   [] Vasopump (50628) [] Other:          Electronically signed by:  Dayana Shah, [address] [facility]

## 2020-08-28 ENCOUNTER — HOSPITAL ENCOUNTER (OUTPATIENT)
Dept: PHYSICAL THERAPY | Age: 75
Setting detail: THERAPIES SERIES
Discharge: HOME | End: 2020-08-28
Payer: MEDICARE

## 2020-08-28 PROCEDURE — 97530 THERAPEUTIC ACTIVITIES: CPT

## 2020-08-28 PROCEDURE — 97110 THERAPEUTIC EXERCISES: CPT

## 2020-08-28 NOTE — FLOWSHEET NOTE
Physical Therapy Daily Treatment Note  Date:  2020    Patient Name:  Nury Sueor    :  1945  MRN: [id number]    Restrictions/Precautions: Position Activity Restriction  Other position/activity restrictions: medium fall risk 2/2 LE pain/weakness, no history of falls. Pertinent Medical History: Additional Pertinent Hx: HTN, HBP, colon CA, CAD, arthritis, sleep apneagout, cpap, afib,  lida TKR, lumbar laminectomy    Medical/Treatment Diagnosis Information:  · Diagnosis: bilateral knee pain, left hip pain, back pain  · Treatment Diagnosis: Decreased functional mobility 2/2 weakness/pain    Insurance/Certification information:  PT Insurance Information: A[employer]na Medicare  Physician Information:  Referring Practitioner: [address] [place] [address] signed (Y/N):  sent on eval date    Visit# / total visits:   Pain level: 310 back and left knee     History of Injury:   Recent history of bilateral knee pain L>R,  5/10-8/10 with activity, left hip pain and back pain as well. Noted that when chemo was done at the end of , that he didn't have the strength in his legs that he did before, and had pain in both knees. Subjective: Low back is bothering him more today (3-4/10), and there is always some pain in his knees. Limited to standing or walking about 5 minutes, pain in both knees, left moreso than right, occasional warmth left knee,  effortful to move in bed, sit to stand, and walk.   Pain 5-6/10 lida knees primarily   -  Able to walk longer distances, a bit easier getting up from chairs, less back pain      Objective:      LE ROM  WFL,   Lumbar ROM - to be assessed  Strength - hip flexion  4-/5, knee extension 4/5, flexion  4/5, ankles WFL    TUG test  Sit to stand  30 sec no UE      Exercise/Equipment Resistance/Repetitions Other comments     Painful knees, left hip , back, decreased strength/ endurance post Chemo for colon CA   Nustep L1 x 6 min   Seat 9 UE 9    HSS step 30 sec x 2 GSS incline  HR/TR   2 x 30 sec   X 15 each      Step ups 6\"  Right x 10 no hand hold  6\" Left x 10 right Hand hold         Standing abd  Standing flexion  Standing extension  Standing knee flexion X 10 L/R   X 10 L/R  X 10  L/R  X 10 L/R       fitter F/B one thin x 10 each  S/s  ADD    ll bars  Tandem gait  reebok  airex   Side step   Fwd/back     4 laps  1 min s/s  n reno  [place] tb 2 laps  Orange tb 4 laps    Leg Press 40# 2 x 10     Knee ext grey  Knee flexion grey 22# 2 x 10 B               SKTC 10 sec x 5    LTR 10         Glut sets 3 sec x 10    Add sets  Tband abd hooklying 10 x 5 sec  Orange ball  Orange x 10   Orange TB issued for hep 8/20        SLR X 10 R/L Difficulty with left   Clamshell add         MHP Seated  Large one across back 12 min   CP   velcro'd to left knee 12 min   HEP SLR, glut set, add set. Other Therapeutic Activities:  Pt was educated on PT POC, Diagnosis, Prognosis, pathomechanics as well as frequency and duration of scheduling future physical therapy appointments. Time was also taken on this day to answer all patient questions and participation in PT. Reviewed appointment policy in detail with patient and patient verbalized understanding. Home Exercise Program:  Patient instructed in the following for HEP as noted above. Patient verbalized/demonstrated understanding .     Treatment/Activity Tolerance:  [x] Patient tolerated treatment well [x] Patient limited by fatigue  [] Patient limited by pain  [] Patient limited by other medical complications  [] Other:     Functional Progress see subj    Prognosis: [x] Good [] Fair  [] Poor    Patient Requires Follow-up: [x] Yes  [] No    Plan:   [x] Continue per plan of care [] Alter current plan (see comments)  [] Plan of care initiated [] Hold pending MD visit [] Discharge    Plan for Next Session:  Add above as stated      Goals:    Short term goals  Time Frame for Short term goals: Discharge 8 weeks  Short term goal 1: Decrease pain to minimal to allow normal ADL/gait  Short term goal 2: Normalize LE flexibiltiy for increased ease with donning shoes/dressing  Short term goal 3: Normalize LE strength to allow normal gait/steps without limitation  Short term goal 4: Maintain good core stability during light ADL  Short term goal 5: Balance/Lumbar ROM goals to be set when fully evaluated              Functional Assessment:  LEFs  39    Charges: Therapeutic Exercise:  [x] (11387) Provided verbal/tactile cueing for activities to restore or maintain strength, flexibility, endurance, ROM for improvements with self-care, mobility, lifting and ambulation. Neuromuscular Re-Education  [] (86556) Provided verbal/tactile cueing for activities to restore or maintain balance, coordination, kinesthetic sense, posture, motor skill, proprioception for self-care, mobility, lifting, and ambulation. Therapeutic Activities:    [x] (05253) Provided verbal/tactile cueing to address functional limitations related to loss of mobility, strength, balance, and coordination.      Gait Training:  [] (70107) Provided training and instruction to the patient for proper postural muscle recruitment and positioning with ambulation re-education     Home Exercise Program:    [x] (52095) Reviewed/Progressed HEP activities related to strengthening, flexibility, endurance, ROM for functional self-care, mobility, lifting and ambulation   [] (78225) Reviewed/Progressed HEP activities related to improving balance, coordination, kinesthetic sense, posture, motor skill, proprioception for self-care, mobility, lifting, and ambulation      Manual Treatments:  MFR / STM / Oscillations-Mobs:  G-I, II, III, IV / Manipulation / MLD  [x] (77793) Provided manual therapy to mobilize  soft tissue/joints/fluid for the purpose of modulating pain, promoting relaxation, increasing ROM, reducing/eliminating soft tissue swelling/inflammation/restriction, improving soft tissue extensibility and allowing for proper ROM for normal function with self- care, mobility, lifting and ambulation.       Timed Code Treatment Minutes: 55   Total Treatment Minutes: 70     [] EVAL (LOW) 31562   [] EVAL (MOD) 64838   [] EVAL (HIGH) 81086   [] RE-EVAL   [x] TE (37880) x 3    [] Aquatic (08848) x  [] NMR (97459)   x  [] Aquatic Group (58566) x  [] Manual (60944) x    [] Ultrasound (15898) x  [x] TA (88615) x  1                    Mech Traction (19055)  [] Ionto (00963)           [] ES (un) (90307):   [] Vasopump (31629) [] Other:          Electronically signed by:  SARIKA [email or web address] (DPT, MS)

## 2020-08-31 ENCOUNTER — HOSPITAL ENCOUNTER (OUTPATIENT)
Dept: PHYSICAL THERAPY | Age: 75
Setting detail: THERAPIES SERIES
Discharge: HOME | End: 2020-08-31
Payer: MEDICARE

## 2020-08-31 PROCEDURE — 97110 THERAPEUTIC EXERCISES: CPT

## 2020-08-31 NOTE — FLOWSHEET NOTE
Physical Therapy Daily Treatment Note  Date:  2020    Patient Name:  Camelia Dickerson    :  1945  MRN: [id number]    Restrictions/Precautions: Position Activity Restriction  Other position/activity restrictions: medium fall risk 2/2 LE pain/weakness, no history of falls. Pertinent Medical History: Additional Pertinent Hx: HTN, HBP, colon CA, CAD, arthritis, sleep apneagout, cpap, afib,  lida TKR, lumbar laminectomy    Medical/Treatment Diagnosis Information:  · Diagnosis: bilateral knee pain, left hip pain, back pain  · Treatment Diagnosis: Decreased functional mobility 2/2 weakness/pain    Insurance/Certification information:  PT Insurance Information: [employer] Medicare  Physician Information:  Referring Practitioner: [address] [place] [address] signed (Y/N):  sent on eval date    Visit# / total visits:  Pain level: 6/10 back and left knee Not sure why he hurts so much today. History of Injury:   Recent history of bilateral knee pain L>R,  5/10-8/10 with activity, left hip pain and back pain as well. Noted that when chemo was done at the end of , that he didn't have the strength in his legs that he did before, and had pain in both knees. Subjective: Low back is bothering him more today (3-4/10), and there is always some pain in his knees. Limited to standing or walking about 5 minutes, pain in both knees, left moreso than right, occasional warmth left knee,  effortful to move in bed, sit to stand, and walk.   Pain 5-10 lida knees primarily   -  Able to walk longer distances, a bit easier getting up from chairs, less back pain      Objective:      LE ROM  WFL,   Lumbar ROM - to be assessed  Strength - hip flexion  4-/5, knee extension 4/5, flexion  4/5, ankles WFL    TUG test  Sit to stand  30 sec no UE      Exercise/Equipment Resistance/Repetitions Other comments     Painful knees, left hip , back, decreased strength/ endurance post Chemo for colon CA   Nustep L1 x 6 min Seat 9 UE 9    HSS step 30 sec x 2    GSS incline  HR/TR   2 x 30 sec   X 15 each      Step ups 6\"  Right x 10 no hand hold  6\" Left x 10 right Hand hold         Standing abd  Standing flexion  Standing extension  Standing knee flexion X 10 L/R   X 10 L/R  X 10  L/R  X 10 L/R       fitter F/B one thin x 10 each  S/s  ADD    ll bars  Tandem gait  reebok  airex   Side step   Fwd/back     4 laps  1 min s/s  n reno  Talent tb 2 laps  Orange tb 4 laps    Leg Press 40# 2 x 10     Knee ext grey  Knee flexion grey 22# 2 x 10 B               SKTC 10 sec x 5    LTR 10         Glut sets 3 sec x 10    Add sets  Tband abd hooklying 10 x 5 sec  Orange ball  Orange x 10   Orange TB issued for hep 8/20        SLR X 10 R/L Difficulty with left   Clamshell add         MHP Seated  Large one across back 12 min   CP   velcro'd to left knee 12 min   HEP SLR, glut set, add set. Other Therapeutic Activities:  Pt was educated on PT POC, Diagnosis, Prognosis, pathomechanics as well as frequency and duration of scheduling future physical therapy appointments. Time was also taken on this day to answer all patient questions and participation in PT. Reviewed appointment policy in detail with patient and patient verbalized understanding. Home Exercise Program:  Patient instructed in the following for HEP as noted above. Patient verbalized/demonstrated understanding .     Treatment/Activity Tolerance:  [x] Patient tolerated treatment well [x] Patient limited by fatigue  [] Patient limited by pain  [] Patient limited by other medical complications  [] Other:     Functional Progress see subj    Prognosis: [x] Good [] Fair  [] Poor    Patient Requires Follow-up: [x] Yes  [] No    Plan:   [x] Continue per plan of care [] Alter current plan (see comments)  [] Plan of care initiated [] Hold pending MD visit [] Discharge    Plan for Next Session:  Add above as stated      Goals:    Short term goals  Time Frame for Short term goals: Discharge 8 weeks  Short term goal 1: Decrease pain to minimal to allow normal ADL/gait  Short term goal 2: Normalize LE flexibiltiy for increased ease with donning shoes/dressing  Short term goal 3: Normalize LE strength to allow normal gait/steps without limitation  Short term goal 4: Maintain good core stability during light ADL  Short term goal 5: Balance/Lumbar ROM goals to be set when fully evaluated        Functional Assessment:  LEFs  39    Charges: Therapeutic Exercise:  [x] (00624) Provided verbal/tactile cueing for activities to restore or maintain strength, flexibility, endurance, ROM for improvements with self-care, mobility, lifting and ambulation. Neuromuscular Re-Education  [] (69778) Provided verbal/tactile cueing for activities to restore or maintain balance, coordination, kinesthetic sense, posture, motor skill, proprioception for self-care, mobility, lifting, and ambulation. Therapeutic Activities:    [x] (43723) Provided verbal/tactile cueing to address functional limitations related to loss of mobility, strength, balance, and coordination.      Gait Training:  [] (54352) Provided training and instruction to the patient for proper postural muscle recruitment and positioning with ambulation re-education     Home Exercise Program:    [x] (33521) Reviewed/Progressed HEP activities related to strengthening, flexibility, endurance, ROM for functional self-care, mobility, lifting and ambulation   [] (13407) Reviewed/Progressed HEP activities related to improving balance, coordination, kinesthetic sense, posture, motor skill, proprioception for self-care, mobility, lifting, and ambulation      Manual Treatments:  MFR / STM / Oscillations-Mobs:  G-I, II, III, IV / Manipulation / MLD  [x] (56027) Provided manual therapy to mobilize  soft tissue/joints/fluid for the purpose of modulating pain, promoting relaxation, increasing ROM, reducing/eliminating soft tissue swelling/inflammation/restriction, improving soft tissue extensibility and allowing for proper ROM for normal function with self- care, mobility, lifting and ambulation.       Timed Code Treatment Minutes: 55   Total Treatment Minutes: 70     [] EVAL (LOW) 56382   [] EVAL (MOD) 52569   [] EVAL (HIGH) 85740   [] RE-EVAL   [x] TE (09942) x 3    [] Aquatic (71242) x  [] NMR (78526)   x  [] Aquatic Group (87015) x  [] Manual (51846) x    [] Ultrasound (31485) x  [] TA (37136) x  1                    Mech Traction (97105)  [] Ionto (76145)           [] ES (un) (40983):   [] Vasopump (77201) [] Other:          Electronically signed by:  SARIKA Lofton (DPT, MS)

## 2020-09-04 ENCOUNTER — APPOINTMENT (OUTPATIENT)
Dept: PHYSICAL THERAPY | Age: 75
End: 2020-09-04
Payer: MEDICARE

## 2020-09-08 ENCOUNTER — HOSPITAL ENCOUNTER (OUTPATIENT)
Dept: PHYSICAL THERAPY | Age: 75
Setting detail: THERAPIES SERIES
Discharge: HOME | End: 2020-09-08
Payer: MEDICARE

## 2020-09-08 PROCEDURE — 97530 THERAPEUTIC ACTIVITIES: CPT

## 2020-09-08 PROCEDURE — 97110 THERAPEUTIC EXERCISES: CPT

## 2020-09-08 NOTE — FLOWSHEET NOTE
Physical Therapy Daily Treatment Note  Date:  2020    Patient Name:  Gina Melchor    :  1945  MRN: [id number]    Restrictions/Precautions: Position Activity Restriction  Other position/activity restrictions: medium fall risk 2/2 LE pain/weakness, no history of falls. Pertinent Medical History: Additional Pertinent Hx: HTN, HBP, colon CA, CAD, arthritis, sleep apneagout, cpap, afib,  lida TKR, lumbar laminectomy    Medical/Treatment Diagnosis Information:  · Diagnosis: bilateral knee pain, left hip pain, back pain  · Treatment Diagnosis: Decreased functional mobility 2/2 weakness/pain    Insurance/Certification information:  PT Insurance Information: A[employer]na Medicare  Physician Information:  Referring Practitioner: [address] [place] [address] signed (Y/N):  sent on eval date    Visit# / total visits:  Pain level: Back 1-2/10  Knees 3-4/10 , some left groin pain noted    History of Injury:   Recent history of bilateral knee pain L>R,  5/10-8/10 with activity, left hip pain and back pain as well. Noted that when chemo was done at the end of , that he didn't have the strength in his legs that he did before, and had pain in both knees. Subjective: Low back is bothering him more today (3-4/10), and there is always some pain in his knees. Limited to standing or walking about 5 minutes, pain in both knees, left moreso than right, occasional warmth left knee,  effortful to move in bed, sit to stand, and walk.   Pain 5-6/10 lida knees primarily   -  Able to walk longer distances, a bit easier getting up from chairs, less back pain      Objective:      LE ROM  WFL,   Lumbar ROM - to be assessed  Strength - hip flexion  4-/5, knee extension 4/5, flexion  4/5, ankles WFL    TUG test  Sit to stand  30 sec no UE      Exercise/Equipment Resistance/Repetitions Other comments     Painful knees, left hip , back, decreased strength/ endurance post Chemo for colon CA   Nustep L1 x 6 min   Seat 9 UE 9    HSS step 30 sec x 2    GSS incline  HR/TR   2 x 30 sec   X 15 each      Step ups 6\"  Right x 10 no hand hold  6\" Left x 10 right Hand hold         Standing abd  Standing flexion  Standing extension  Standing knee flexion 1# X 10 L/R   1# X 10 L/R  1# X 10 L/R  1# X 10 L/R       Fitter        Sit to stand from mat without UE F/B one thin x 10 each  S/s  R/L    ll bars  Tandem gait  reebok  airex   Side step   Fwd/back       4 laps  1 min s/s  n reno  Ashley tb 2 laps  Orange tb 4 laps    Leg Press 40# 2 x 10     Knee ext grey  Knee flexion grey 22# 2 x 10 B               SKTC 10 sec x 5    LTR 10            Tband abd hooklying   Ashley x 10   Orange TB issued for hep 8/20        SLR X 10 R/L Difficulty with left   Clamshell 10 each R/L         MHP Seated  Large one across back 12 min   CP   velcro'd to left knee 12 min   HEP SLR, glut set, add set. Other Therapeutic Activities:  Pt was educated on PT POC, Diagnosis, Prognosis, pathomechanics as well as frequency and duration of scheduling future physical therapy appointments. Time was also taken on this day to answer all patient questions and participation in PT. Reviewed appointment policy in detail with patient and patient verbalized understanding. Home Exercise Program:  Patient instructed in the following for HEP as noted above. Patient verbalized/demonstrated understanding .     Treatment/Activity Tolerance:  [x] Patient tolerated treatment well [x] Patient limited by fatigue  [] Patient limited by pain  [] Patient limited by other medical complications  [] Other:     Functional Progress see subj    Prognosis: [x] Good [] Fair  [] Poor    Patient Requires Follow-up: [x] Yes  [] No    Plan:   [x] Continue per plan of care [] Alter current plan (see comments)  [] Plan of care initiated [] Hold pending MD visit [] Discharge    Plan for Next Session:  Add above as stated      Goals:    Short term goals  Time Frame for Short term goals: Discharge 8 weeks  Short term goal 1: Decrease pain to minimal to allow normal ADL/gait  Short term goal 2: Normalize LE flexibiltiy for increased ease with donning shoes/dressing  Short term goal 3: Normalize LE strength to allow normal gait/steps without limitation  Short term goal 4: Maintain good core stability during light ADL  Short term goal 5: Balance/Lumbar ROM goals to be set when fully evaluated        Functional Assessment:  LEFs  39    Charges: Therapeutic Exercise:  [x] (09834) Provided verbal/tactile cueing for activities to restore or maintain strength, flexibility, endurance, ROM for improvements with self-care, mobility, lifting and ambulation. Neuromuscular Re-Education  [] (16398) Provided verbal/tactile cueing for activities to restore or maintain balance, coordination, kinesthetic sense, posture, motor skill, proprioception for self-care, mobility, lifting, and ambulation. Therapeutic Activities:    [x] (90353) Provided verbal/tactile cueing to address functional limitations related to loss of mobility, strength, balance, and coordination.      Gait Training:  [] (51502) Provided training and instruction to the patient for proper postural muscle recruitment and positioning with ambulation re-education     Home Exercise Program:    [x] (62894) Reviewed/Progressed HEP activities related to strengthening, flexibility, endurance, ROM for functional self-care, mobility, lifting and ambulation   [] (48959) Reviewed/Progressed HEP activities related to improving balance, coordination, kinesthetic sense, posture, motor skill, proprioception for self-care, mobility, lifting, and ambulation      Manual Treatments:  MFR / STM / Oscillations-Mobs:  G-I, II, III, IV / Manipulation / MLD  [x] (31463) Provided manual therapy to mobilize  soft tissue/joints/fluid for the purpose of modulating pain, promoting relaxation, increasing ROM, reducing/eliminating soft tissue swelling/inflammation/restriction, improving soft tissue extensibility and allowing for proper ROM for normal function with self- care, mobility, lifting and ambulation.       Timed Code Treatment Minutes: 55   Total Treatment Minutes: 70     [] EVAL (LOW) 25610   [] EVAL (MOD) 42068   [] EVAL (HIGH) 65674   [] RE-EVAL   [x] TE (93617) x 3    [] Aquatic (41431) x  [] NMR (28427)   x  [] Aquatic Group (02881) x  [] Manual (00900) x    [] Ultrasound (31538) x  [x] TA (10199) x  1                    Mech Traction (41151)  [] Ionto (30513)           [] ES (un) (28110):   [] Vasopump (62350) [] Other:          Electronically signed by:  SARIKA Morris (DPT, MS)

## 2020-09-11 ENCOUNTER — HOSPITAL ENCOUNTER (OUTPATIENT)
Dept: PHYSICAL THERAPY | Age: 75
Setting detail: THERAPIES SERIES
Discharge: HOME | End: 2020-09-11
Payer: MEDICARE

## 2020-09-11 PROCEDURE — 97110 THERAPEUTIC EXERCISES: CPT

## 2020-09-11 PROCEDURE — 97530 THERAPEUTIC ACTIVITIES: CPT

## 2020-09-11 NOTE — FLOWSHEET NOTE
Physical Therapy Daily Treatment Note  Date:  2020    Patient Name:  Felice Mesa    :  1945  MRN: [id number]    Restrictions/Precautions: Position Activity Restriction  Other position/activity restrictions: medium fall risk 2/2 LE pain/weakness, no history of falls. Pertinent Medical History: Additional Pertinent Hx: HTN, HBP, colon CA, CAD, arthritis, sleep apneagout, cpap, afib,  lida TKR, lumbar laminectomy    Medical/Treatment Diagnosis Information:  · Diagnosis: bilateral knee pain, left hip pain, back pain  · Treatment Diagnosis: Decreased functional mobility 2/2 weakness/pain    Insurance/Certification information:  PT Insurance Information: A[employer]na Medicare  Physician Information:  Referring Practitioner: [address] [place] [address] signed (Y/N):  sent on eval date    Visit# / total visits:      Pain level: Back 1-2/10  Knees 3-4/10 , some left groin pain noted    History of Injury:   Recent history of bilateral knee pain L>R,  5/10-8/10 with activity, left hip pain and back pain as well. Noted that when chemo was done at the end of , that he didn't have the strength in his legs that he did before, and had pain in both knees. Subjective: Low back is bothering him more today (3-4/10), and there is always some pain in his knees. Limited to standing or walking about 5 minutes, pain in both knees, left moreso than right, occasional warmth left knee,  effortful to move in bed, sit to stand, and walk.   Pain 5-/10 lida knees primarily   -  Able to walk longer distances, a bit easier getting up from chairs, less back pain      Objective:      LE ROM  WFL,   Lumbar ROM - to be assessed  Strength - hip flexion  4-/5, knee extension 4/5, flexion  4/5, ankles WFL    TUG test  Sit to stand  30 sec no UE      Exercise/Equipment Resistance/Repetitions Other comments     Painful knees, left hip , back, decreased strength/ endurance post Chemo for colon CA   Nustep L1 x 6 min   Seat 9 UE 9    HSS step 30 sec x 2    GSS incline  HR/TR   2 x 30 sec   X 15 each      Step ups 6\"  Right x 10 no hand hold  6\" Left x 10 right Hand hold         Standing abd  Standing flexion  Standing extension  Standing knee flexion 2# X 10 L/R   2# X 10 L/R  2# X 10 L/R  2# X 10 L/R       Fitter        Sit to stand from mat without UE F/B one thin x 10 each  S/s  R/L    ll bars  Tandem gait  reebok  airex   Side step   Fwd/back    SLS      4 laps  1 min s/s  n reno - SLS  [place] tb 2 laps  Orange tb 4 laps    30 sec R/L    Leg Press 60# 2 x 10     Knee ext grey  Knee flexion grey 22# 2 x 10 B               SKTC 10 sec x 5    LTR 10       SLR X 10 R/L Difficulty with left   Clamshell 10 each R/L         MHP Seated  Large one across back 12 min   CP   velcro'd to left knee 12 min   HEP SLR, glut set, add set. tband hooklying      Other Therapeutic Activities:  Pt was educated on PT POC, Diagnosis, Prognosis, pathomechanics as well as frequency and duration of scheduling future physical therapy appointments. Time was also taken on this day to answer all patient questions and participation in PT. Reviewed appointment policy in detail with patient and patient verbalized understanding. Home Exercise Program:  Patient instructed in the following for HEP as noted above. Patient verbalized/demonstrated understanding .     Treatment/Activity Tolerance:  [x] Patient tolerated treatment well [x] Patient limited by fatigue  [] Patient limited by pain  [] Patient limited by other medical complications  [] Other:     Functional Progress see subj    Prognosis: [x] Good [] Fair  [] Poor    Patient Requires Follow-up: [x] Yes  [] No    Plan:   [x] Continue per plan of care [] Alter current plan (see comments)  [] Plan of care initiated [] Hold pending MD visit [] Discharge    Plan for Next Session:  Add above as stated      Goals:    Short term goals  Time Frame for Short term goals: Discharge 8 weeks  Short term goal 1: Decrease pain to minimal to allow normal ADL/gait  Short term goal 2: Normalize LE flexibiltiy for increased ease with donning shoes/dressing  Short term goal 3: Normalize LE strength to allow normal gait/steps without limitation  Short term goal 4: Maintain good core stability during light ADL  Short term goal 5: Balance/Lumbar ROM goals to be set when fully evaluated        Functional Assessment:  LEFs  39    Charges: Therapeutic Exercise:  [x] (97691) Provided verbal/tactile cueing for activities to restore or maintain strength, flexibility, endurance, ROM for improvements with self-care, mobility, lifting and ambulation. Neuromuscular Re-Education  [] (51994) Provided verbal/tactile cueing for activities to restore or maintain balance, coordination, kinesthetic sense, posture, motor skill, proprioception for self-care, mobility, lifting, and ambulation. Therapeutic Activities:    [x] (57085) Provided verbal/tactile cueing to address functional limitations related to loss of mobility, strength, balance, and coordination.      Gait Training:  [] (33623) Provided training and instruction to the patient for proper postural muscle recruitment and positioning with ambulation re-education     Home Exercise Program:    [x] (91746) Reviewed/Progressed HEP activities related to strengthening, flexibility, endurance, ROM for functional self-care, mobility, lifting and ambulation   [] (30564) Reviewed/Progressed HEP activities related to improving balance, coordination, kinesthetic sense, posture, motor skill, proprioception for self-care, mobility, lifting, and ambulation      Manual Treatments:  MFR / STM / Oscillations-Mobs:  G-I, II, III, IV / Manipulation / MLD  [x] (47145) Provided manual therapy to mobilize  soft tissue/joints/fluid for the purpose of modulating pain, promoting relaxation, increasing ROM, reducing/eliminating soft tissue swelling/inflammation/restriction, improving soft tissue extensibility and allowing for proper ROM for normal function with self- care, mobility, lifting and ambulation.       Timed Code Treatment Minutes: 55   Total Treatment Minutes: 70     [] EVAL (LOW) 71663   [] EVAL (MOD) 01535   [] EVAL (HIGH) 44937   [] RE-EVAL   [x] TE (41168) x 3    [] Aquatic (96526) x  [] NMR (45203)   x  [] Aquatic Group (45179) x  [] Manual (60749) x    [] Ultrasound (62198) x  [x] TA (51663) x  1                    Mech Traction (18402)  [] Ionto (57306)           [] ES (un) (49575):   [] Vasopump (95059) [] Other:          Electronically signed by:  SARIKA Brito (DPT, MS)

## 2020-09-15 ENCOUNTER — HOSPITAL ENCOUNTER (OUTPATIENT)
Dept: PHYSICAL THERAPY | Age: 75
Setting detail: THERAPIES SERIES
Discharge: HOME | End: 2020-09-15
Payer: MEDICARE

## 2020-09-15 PROCEDURE — 97110 THERAPEUTIC EXERCISES: CPT

## 2020-09-15 NOTE — FLOWSHEET NOTE
2    GSS incline  HR/TR   2 x 30 sec   X 15 each      Step ups 6\"  Right x 10 no hand hold  6\" Left x 10 right Hand hold         Standing abd  Standing flexion  Standing extension  Standing knee flexion 2# X 10 L/R   2# X 10 L/R  2# X 10 L/R  2# X 10 L/R       Fitter        Sit to stand from mat without UE F/B one thin x 10 each  S/s  R/L    ll bars  Tandem gait  reebok  airex   Side step   Fwd/back    SLS      4 laps  1 min s/s  n reno - SLS  [place] tb 2 laps  Orange tb 4 laps    30 sec R/L    Leg Press 60# 2 x 10     Knee ext grey  Knee flexion grey 15#R/L 2 x 10  15# R/L 2 x 10              9/15 no time today 2/2 MD visit     MHP Seated  Large one across back 12 min   CP   velcro'd to left knee 12 min   HEP SLR, glut set, add set. tband hooklying      Other Therapeutic Activities:  Pt was educated on PT POC, Diagnosis, Prognosis, pathomechanics as well as frequency and duration of scheduling future physical therapy appointments. Time was also taken on this day to answer all patient questions and participation in PT. Reviewed appointment policy in detail with patient and patient verbalized understanding. Home Exercise Program:  Patient instructed in the following for HEP as noted above. Patient verbalized/demonstrated understanding .     Treatment/Activity Tolerance:  [x] Patient tolerated treatment well [x] Patient limited by fatigue  [] Patient limited by pain  [] Patient limited by other medical complications  [] Other:     Functional Progress see subj    Prognosis: [x] Good [] Fair  [] Poor    Patient Requires Follow-up: [x] Yes  [] No    Plan:   [x] Continue per plan of care [] Alter current plan (see comments)  [] Plan of care initiated [] Hold pending MD visit [] Discharge    Plan for Next Session:  Add above as stated      Goals:    Short term goals  Time Frame for Short term goals: Discharge 8 weeks  Short term goal 1: Decrease pain to minimal to allow normal ADL/gait  Short term goal 2: Normalize LE Timed Code Treatment Minutes: 45   Total Treatment Minutes: 60     [] EVAL (LOW) 74986   [] EVAL (MOD) 76111   [] EVAL (HIGH) 33591   [] RE-EVAL   [x] TE (62212) x 3    [] Aquatic (19765) x  [] NMR (40900)   x  [] Aquatic Group (46041) x  [] Manual (67222) x    [] Ultrasound (55103) x  [] TA (78216) x                      Mech Traction (84571)  [] Ionto (53839)           [] ES (un) (31449):   [] Vasopump (75579) [] Other:          Electronically signed by:  Cristopher Navas, PT [id number] (DPT, MS)

## 2020-09-15 NOTE — FLOWSHEET NOTE
Physical Therapy Progress/ Discharge  Date:  9/15/2020    Patient Name:  Venus Boykin    :  1945  MRN: [id number]    Restrictions/Precautions: Position Activity Restriction  Other position/activity restrictions: medium fall risk 2/2 LE pain/weakness, no history of falls. Pertinent Medical History: Additional Pertinent Hx: HTN, HBP, colon CA, CAD, arthritis, sleep apneagout, cpap, afib,  lida TKR, lumbar laminectomy    Medical/Treatment Diagnosis Information:  · Diagnosis: bilateral knee pain, left hip pain, back pain  · Treatment Diagnosis: Decreased functional mobility 2/2 weakness/pain    Physician Information:  Referring Practitioner: [facility]    Visit# / total visits:    10/11  Pain level: Back 0/10    Left knee 3-4/10     History of Injury:   Recent history of bilateral knee pain L>R,  5/10-8/10 with activity, left hip pain and back pain as well. Noted that when chemo was done at the end of , that he didn't have the strength in his legs that he did before, and had pain in both knees. Subjective:  Currently no back pain,  Left knee is normally uncomfortable. 9/15 -  Able to walk longer distances, a bit easier getting up from chairs. Has been able to go to [employer] which he enjoys. Feels about 75% better    Objective:     LE ROM and strength is [facility]  Balance -  Tandem balance R/L 20 sec      Assessment -  Pt demonstrates improved functional strength and mobility, better balance and endurance as well.     Functional Assessment:  LEFs  45  9/15/20    Plan -  DC after last visit next week - Pt plans to resume exercise at [facility]          Electronically signed by:  Dois Severs,  [id number] (DPT, MS)

## 2020-09-17 ENCOUNTER — OFFICE VISIT (OUTPATIENT)
Dept: ORTHOPEDIC SURGERY | Age: 75
End: 2020-09-17
Payer: MEDICARE

## 2020-09-17 VITALS — TEMPERATURE: 97.9 F

## 2020-09-17 PROBLEM — Z96.653 HISTORY OF BILATERAL KNEE ARTHROPLASTY: Status: ACTIVE | Noted: 2020-09-17

## 2020-09-17 PROBLEM — R26.89 BALANCE PROBLEM: Status: ACTIVE | Noted: 2020-09-17

## 2020-09-17 PROBLEM — M62.81 MUSCLE WEAKNESS (GENERALIZED): Status: ACTIVE | Noted: 2020-09-17

## 2020-09-17 PROCEDURE — 99214 OFFICE O/P EST MOD 30 MIN: CPT | Performed by: PHYSICIAN ASSISTANT

## 2020-09-17 RX ORDER — TIZANIDINE 4 MG/1
4 TABLET ORAL 3 TIMES DAILY PRN
Qty: 90 TABLET | Refills: 1 | Status: SHIPPED | OUTPATIENT
Start: 2020-09-17 | End: 2021-09-08 | Stop reason: ALTCHOICE

## 2020-09-17 NOTE — PROGRESS NOTES
Subjective:      Patient ID: Felice Mesa is a 67 y.o.  male. Chief Complaint   Patient presents with    Knee Problem     Bilateral        HPI: He is here for follow-up on his bilateral knee arthroplasties performed in 2011 and 2012. He was having some weakness and discomfort in the knees. He is here for follow-up status post physical therapy, quad strengthening which she feels has been helping. He states the muscle relaxer has also been beneficial.  He is a little bit concerned today about his balance issues. Review of Systems:   Negative for fever or chills. Negative for numbness or tingling in the lower extremities.     Past Medical History:   Diagnosis Date    Arthritis     Atrial fibrillation ([address]     CAD (coronary artery disease)     Colon cancer (HCC)     CPAP (continuous positive airway pressure) dependence     Difficult intubation     many years ago, has been OK recently     Gout     High blood pressure     Hyperlipidemia     Sleep apnea        Family History   Problem Relation Age of Onset    Diabetes Mother     High Blood Pressure Mother     Cancer Mother     Diabetes Father     High Blood Pressure Father     Cancer Father     Heart Disease Other     High Blood Pressure Other        Past Surgical History:   Procedure Laterality Date    ABDOMEN SURGERY      COLECTOMY      COLONOSCOPY  02/12/2014    sm sessile polyp  hemorrhoids--kassi 2019---dr mora     COLONOSCOPY N/A 10/25/2019    COLONOSCOPY WITH BIOPSY performed by Onel Dotson MD at [address] [facility] Right 12/13/2019    LAPAROSCOPIC RIGHT COLECTOMY performed by Tracy Gamino MD at [facility] Bilateral     knees    KNEE ARTHROSCOPY  10.1994    LIVER RESECTION N/A 12/13/2019    DIAGNOSTIC LAPAROSCOPY, LIVER ULTRASOUND performed by Joaquin Hood MD at [facility]  10/28/2016    dr dalal==[facility]     PORT SURGERY N/A 1/9/2020 SINGLE LUMEN PORT PLACEMENT WITH FLOURO WITH C-ARM performed by Lauren Fuentes MD at [address] Right 11    TKR on right knee    TOTAL KNEE ARTHROPLASTY Left 12    Lt TKR---dr Cayetano Lackey        Social History     Occupational History    Occupation: retired   Tobacco Use    Smoking status: Former Smoker     Packs/day: 0.25     Years: 12.00     Pack years: 3.00     Types: Cigarettes     Start date: 1960     Last attempt to quit: 1971     Years since quittin.1    Smokeless tobacco: Never Used   Substance and Sexual Activity    Alcohol use: Yes     Comment: occ     Drug use: No    Sexual activity: Not Currently       Current Outpatient Medications   Medication Sig Dispense Refill    tiZANidine (ZANAFLEX) 4 MG tablet Take 1 tablet by mouth 3 times daily as needed (prn spasm) 90 tablet 1    atorvastatin (LIPITOR) 40 MG tablet Take 1 tablet by mouth daily 90 tablet 4    sildenafil (VIAGRA) 50 MG tablet Take 1 tablet by mouth daily as needed for Erectile Dysfunction 10 tablet 0    lovastatin (MEVACOR) 20 MG tablet TAKE ONE TABLET BY MOUTH ONCE NIGHTLY 90 tablet 3    allopurinol (ZYLOPRIM) 100 MG tablet TAKE ONE TABLET BY MOUTH DAILY 90 tablet 3    lisinopril (PRINIVIL;ZESTRIL) 40 MG tablet Take 20 mg by mouth daily  30 tablet     warfarin (COUMADIN) 5 MG tablet Take 2.5 mg by mouth daily at 1800 Patient takes 2.5mg three times weekly on Monday, Wednesday and Friday. He takes no warfarin at all on other days of the week.  flecainide (TAMBOCOR) 100 MG tablet Take 100 mg by mouth 2 times daily        No current facility-administered medications for this visit. Objective:   He is alert, oriented x 3, pleasant, well nourished, developed and in no acute distress. Temp 97.9 °F (36.6 °C) (Temporal)      Examination of the bilateral knee: The alignment of the knee is neutral.   There is not erythema. There mild soft tissue swelling.   There is no effusion. ROM-  Extension 0          -   Flexion  115   There no pain associated with ROM testing. Extensor Mechanism is  intact. There is no instability with varus, valgus or anterior drawer testing. Gait seems to be a little bit unsteady. Examination of the lower extremities are intact with sensation to light touch. Motor testing  4+ to 5/5 in all major motor groups of the lower extremities. Negative Miranda's Sign. SLR negative. Examination of the lower extremities shows intact perfusion to all extremities. No cyanosis. Digits are warm to touch, capillary refill is less than 2 seconds. There is mild edema noted. Examination of the skin over both lower extremities reveals: The skin to be intact without lacerations or abrasions. No significant erythema. No rashes or skin lesions. X Rays: not performed in the office today:     Diagnosis:        ICD-10-CM    1. History of bilateral knee arthroplasty  Z96.653 Ambulatory referral to Physical Therapy   2. Balance problem  R26.89 Ambulatory referral to Physical Therapy   3. Muscle weakness (generalized)  M62.81 Ambulatory referral to Physical Therapy        Assessment/ Plan:      I did spend 25 minutes in the office today with greater than 50% of this time counseling, reviewing diagnostic tests, face to face discussion concerning their diagnosis and treatment options. All of their questions were answered. Clinically stable knee arthroplasties. Generalized weakness bilateral lower extremities. Balance problem. The natural history of the patient's diagnosis as well as the treatment options were discussed in full and questions were answered. Risks and benefits of the treatment options also reviewed in detail. Finish out therapy for quad strengthening exercises progress to home exercise program.  He is interested in physical therapy for balance evaluation and treatment. Refilled Zanaflex 4 mg tablets for muscle spasm.     Follow Up:   Call or return to clinic prn if these symptoms worsen or fail to improve as anticipated.

## 2020-09-18 ENCOUNTER — APPOINTMENT (OUTPATIENT)
Dept: PHYSICAL THERAPY | Age: 75
End: 2020-09-18
Payer: MEDICARE

## 2020-09-21 ENCOUNTER — HOSPITAL ENCOUNTER (OUTPATIENT)
Dept: PHYSICAL THERAPY | Age: 75
Setting detail: THERAPIES SERIES
Discharge: HOME | End: 2020-09-21
Payer: MEDICARE

## 2020-09-21 PROCEDURE — 97110 THERAPEUTIC EXERCISES: CPT

## 2020-09-21 NOTE — FLOWSHEET NOTE
Physical Therapy Daily Treatment Note  Date:  2020    Patient Name:  Citlali Guevara    :  1945  MRN: [id number]    Restrictions/Precautions: Position Activity Restriction  Other position/activity restrictions: medium fall risk 2/2 LE pain/weakness, no history of falls. Pertinent Medical History: Additional Pertinent Hx: HTN, HBP, colon CA, CAD, arthritis, sleep apneagout, cpap, afib,  lida TKR, lumbar laminectomy    Medical/Treatment Diagnosis Information:  · Diagnosis: bilateral knee pain, left hip pain, back pain  · Treatment Diagnosis: Decreased functional mobility 2/2 weakness/pain    Insurance/Certification information:  PT Insurance Information: [employer]  Physician Information:  Referring Practitioner: [address] [place] [address] signed (Y/N):  sent on eval date    Visit# / total visits:      Pain level: Back 0-1/10  Knees 3-4/10     History of Injury:   Recent history of bilateral knee pain L>R,  5/10-8/10 with activity, left hip pain and back pain as well. Noted that when chemo was done at the end of , that he didn't have the strength in his legs that he did before, and had pain in both knees. Subjective: Low back is bothering him more today (3-4/10), and there is always some pain in his knees. Limited to standing or walking about 5 minutes, pain in both knees, left moreso than right, occasional warmth left knee,  effortful to move in bed, sit to stand, and walk. Pain 5-10 lida knees primarily   -  Able to walk longer distances, a bit easier getting up from chairs, less back pain     - much more functional, continuing to do exercises at home.     Objective:      LE ROM  WFL,   Lumbar ROM - to be assessed  Strength - hip flexion  4-/5, knee extension 4/5, flexion  4/5, ankles WFL        Exercise/Equipment Resistance/Repetitions Other comments     Painful knees, left hip , back, decreased strength/ endurance post Chemo for colon CA   Nustep L1 x 6 min   Seat 9 UE 9    HSS step 30 sec x 2    GSS incline  HR/TR   2 x 30 sec   X 15 each      Step ups 6\"  Right x 10 no hand hold  6\" Left x 10 right Hand hold         Standing abd  Standing flexion  Standing extension  Standing knee flexion 2# X 10 L/R   2# X 10 L/R  2# X 10 L/R  2# X 10 L/R       Fitter        Sit to stand from mat without UE F/B one thin x 10 each  S/s  R/L    ll bars  Tandem gait  reebok  airex   Side step   Fwd/back    SLS      4 laps  1 min s/s  n reno - SLS  Churchill tb 2 laps  Orange tb 4 laps    30 sec R/L    Leg Press 60# 2 x 10     Knee ext grey  Knee flexion grey 15#R/L 2 x 10  15# R/L 2 x 10              9/15 no time today 2/2 MD visit     MHP Seated  Large one across back 12 min   CP   velcro'd to left knee 12 min   HEP SLR, glut set, add set. tband hooklying      Other Therapeutic Activities:  Pt was educated on PT POC, Diagnosis, Prognosis, pathomechanics as well as frequency and duration of scheduling future physical therapy appointments. Time was also taken on this day to answer all patient questions and participation in PT. Reviewed appointment policy in detail with patient and patient verbalized understanding. Home Exercise Program:  Patient instructed in the following for HEP as noted above. Patient verbalized/demonstrated understanding .     Treatment/Activity Tolerance:  [x] Patient tolerated treatment well [x] Patient limited by fatigue  [] Patient limited by pain  [] Patient limited by other medical complications  [] Other:     Functional Progress see subj    Prognosis: [x] Good [] Fair  [] Poor    Patient Requires Follow-up: [x] Yes  [] No    Plan:   [x] Continue per plan of care [] Alter current plan (see comments)  [] Plan of care initiated [] Hold pending MD visit [] Discharge    Plan for Next Session:  Add above as stated      Goals:    Short term goals  Time Frame for Short term goals: Discharge 8 weeks  Short term goal 1: Decrease pain to minimal to allow normal ADL/gait  Short term goal 2: Normalize LE flexibiltiy for increased ease with donning shoes/dressing met 9/15  Short term goal 3: Normalize LE strength to allow normal gait/steps without limitation  Step to gait partially met 9/15  Short term goal 4: Maintain good core stability during light ADL   Met 9/15       Functional Assessment:  LEFs  45  9/15/20    Charges: Therapeutic Exercise:  [x] (65107) Provided verbal/tactile cueing for activities to restore or maintain strength, flexibility, endurance, ROM for improvements with self-care, mobility, lifting and ambulation. Neuromuscular Re-Education  [] (79119) Provided verbal/tactile cueing for activities to restore or maintain balance, coordination, kinesthetic sense, posture, motor skill, proprioception for self-care, mobility, lifting, and ambulation. Therapeutic Activities:    [x] (56971) Provided verbal/tactile cueing to address functional limitations related to loss of mobility, strength, balance, and coordination.      Gait Training:  [] (79719) Provided training and instruction to the patient for proper postural muscle recruitment and positioning with ambulation re-education     Home Exercise Program:    [x] (65951) Reviewed/Progressed HEP activities related to strengthening, flexibility, endurance, ROM for functional self-care, mobility, lifting and ambulation   [] (20056) Reviewed/Progressed HEP activities related to improving balance, coordination, kinesthetic sense, posture, motor skill, proprioception for self-care, mobility, lifting, and ambulation      Manual Treatments:  MFR / STM / Oscillations-Mobs:  G-I, II, III, IV / Manipulation / MLD  [x] (88432) Provided manual therapy to mobilize  soft tissue/joints/fluid for the purpose of modulating pain, promoting relaxation, increasing ROM, reducing/eliminating soft tissue swelling/inflammation/restriction, improving soft tissue extensibility and allowing for proper ROM for normal function with self- care, mobility, lifting and ambulation.       Timed Code Treatment Minutes: 45   Total Treatment Minutes: 60     [] EVAL (LOW) 91358   [] EVAL (MOD) 44351   [] EVAL (HIGH) 80286   [] RE-EVAL   [x] TE (67054) x 3    [] Aquatic (28280) x  [] NMR (20744)   x  [] Aquatic Group (05536) x  [] Manual (85268) x    [] Ultrasound (13316) x  [] TA (78142) x                      Mech Traction (31064)  [] Ionto (80292)           [] ES (un) (71055):   [] Vasopump (55011) [] Other:          Electronically signed by:  Christian Luciano, PT [id number] (DPT, MS)

## 2020-09-21 NOTE — FLOWSHEET NOTE
Physical Therapy Progress/ Discharge  Date:  2020    Patient Name:  Mateusz Desir    :  1945  MRN: [id number]    Restrictions/Precautions: Position Activity Restriction  Other position/activity restrictions: medium fall risk 2/2 LE pain/weakness, no history of falls. Pertinent Medical History: Additional Pertinent Hx: HTN, HBP, colon CA, CAD, arthritis, sleep apneagout, cpap, afib,  lida TKR, lumbar laminectomy    Medical/Treatment Diagnosis Information:  · Diagnosis: bilateral knee pain, left hip pain, back pain  · Treatment Diagnosis: Decreased functional mobility 2/2 weakness/pain    Physician Information:  Referring Practitioner: Blessing    Visit# / total visits:    10/11  Pain level: Back 0/10    Left knee 3-4/10     History of Injury:   Recent history of bilateral knee pain L>R,  5/10-8/10 with activity, left hip pain and back pain as well. Noted that when chemo was done at the end of , that he didn't have the strength in his legs that he did before, and had pain in both knees. Subjective:  Currently no back pain,  Left knee is normally uncomfortable. 9/15 -  Able to walk longer distances, a bit easier getting up from chairs. Has been able to go to [employer] which he enjoys. Feels about 75% better    Objective:     LE ROM and strength is [facility]  Balance -  Tandem balance R/L 20 sec      Assessment -  Pt demonstrates improved functional strength and mobility, better balance and endurance as well.     Functional Assessment:  LEFs  45  9/15/20    Plan -  DC after last visit next week - Pt plans to resume exercise at [facility]          Electronically signed by:  Christian Luciano,  1275 (DPT, MS)

## 2020-12-14 ENCOUNTER — OFFICE VISIT (OUTPATIENT)
Dept: PRIMARY CARE CLINIC | Age: 75
End: 2020-12-14
Payer: MEDICARE

## 2020-12-14 PROCEDURE — 99211 OFF/OP EST MAY X REQ PHY/QHP: CPT | Performed by: NURSE PRACTITIONER

## 2020-12-15 NOTE — FLOWSHEET NOTE
Pt. Is scheduled for a colonoscopy on 12/18/2020 with Dr. Telly Vásquez. He has a history of Atrial fib. He said that he has had this for 10 years or so. He sees Dr. Anselmo Arias, his cardiologist, at [facility].  Latest EKG and Echo reviewed with Dr. Juvencio Lira. Ok to do pt's [procedure at [facility].

## 2020-12-15 NOTE — PROGRESS NOTES
[address]  time_12/18/2020 0730___________        Surgery time____0830________    Take the following medications with a sip of water:    Do not eat or drink anything after 12:00 midnight prior to your surgery. This includes water chewing gum, mints and ice chips. You may brush your teeth and gargle the morning of your surgery, but do not swallow the water     Please see your family doctor/pediatrician for a history and physical and/or concerning medications. Bring any test results/reports from your physicians office. If you are under the care of a heart doctor or specialist doctor, please be aware that you may be asked to them for clearance    You may be asked to stop blood thinners such as Coumadin, Plavix, Fragmin, Lovenox, etc., or any anti-inflammatories such as:  Aspirin, Ibuprofen, Advil, Naproxen prior to your surgery. We also ask that you stop any OTC medications such as fish oil, vitamin E, glucosamine, garlic, Multivitamins, COQ 10, etc.    We ask that you do not smoke 24 hours prior to surgery  We ask that you do not  drink any alcoholic beverages 24 hours prior to surgery     You must make arrangements for a responsible adult to take you home after your surgery. For your safety you will not be allowed to leave alone or drive yourself home. Your surgery will be cancelled if you do not have a ride home. Also for your safety, it is strongly suggested that someone stay with you the first 24 hours after your surgery. A parent or legal guardian must accompany a child scheduled for surgery and plan to stay at the hospital until the child is discharged. Please do not bring other children with you. For your comfort, please wear simple loose fitting clothing to the hospital.  Please do not bring valuables.     Do not wear any make-up or nail polish on your fingers or toes For your safety, please do not wear any jewelry or body piercing's on the day of surgery. All jewelry must be removed. If you have dentures, they will be removed before going to operating room. For your convenience, we will provide you with a container. If you wear contact lenses or glasses, they will be removed, please bring a case for them. If you have a living will and a durable power of  for healthcare, please bring in a copy. As part of our patient safety program to minimize surgical site infections, we ask you to do the following:    · Please notify your surgeon if you develop any illness between         now and the  day of your surgery. · This includes a cough, cold, fever, sore throat, nausea,         or vomiting, and diarrhea, etc.  ·  Please notify your surgeon if you experience dizziness, shortness         of breath or blurred vision between now and the time of your surgery. Do not shave your operative site 96 hours prior to surgery. For face and neck surgery, men may use an electric razor 48 hours   prior to surgery. You may shower the night before surgery or the morning of   your surgery with an antibacterial soap. You will need to bring a photo ID and insurance card    [facility] has an onsite pharmacy, would you like to utilize our pharmacy     If you will be staying overnight and use a C-pap machine, please bring   your C-pap to hospital     Our goal is to provide you with excellent care, therefore, visitors will be limited to two(2) in the room at a time so that we may focus on providing this care for you. Please contact pre-admission testing if you have any further questions. [facility] phone number:  [phone]  Please note these are generalized instructions for all surgical cases, you may be provided with more specific instructions according to your surgery.

## 2020-12-15 NOTE — FLOWSHEET NOTE
Preoperative Screening for Elective Surgery/Invasive Procedures While COVID-19 present in the community    ? Have you tested positive or have been told to self-isolate for COVID-19 like symptoms within the past 28 days? ? Do you currently have any of the following symptoms? o Fever >100.0 F or 99.9 F in immunocompromised patients? o New onset cough, shortness of breath or difficulty breathing?  o New onset sore throat, myalgia (muscle aches and pains), headache, loss of taste/smell or diarrhea? ? Have you had a potential exposure to COVID-19 within the past 14 days by:  o Close contact with a confirmed case? o Close contact with a healthcare worker,  or essential infrastructure worker (grocery store, [employer], gas station, public utilities or transportation)? o Do you reside in a congregate setting such as; skilled nursing facility, adult home, correctional facility, homeless shelter or other institutional setting?  o Have you had recent travel to a known COVID-19 hotspot? Indicate if the patient has a positive screen by answering yes to one or more of the above questions. Patients who test positive or screen positive prior to surgery or on the day of surgery should be evaluated in conjunction with the surgeon/proceduralist/anesthesiologist to determine the urgency of the procedure.     no  To all above

## 2020-12-16 LAB — SARS-COV-2, NAA: NOT DETECTED

## 2020-12-17 ENCOUNTER — ANESTHESIA EVENT (OUTPATIENT)
Dept: ENDOSCOPY | Age: 75
End: 2020-12-17
Payer: MEDICARE

## 2020-12-18 ENCOUNTER — HOSPITAL ENCOUNTER (OUTPATIENT)
Age: 75
Setting detail: OUTPATIENT SURGERY
Discharge: HOME | End: 2020-12-18
Attending: INTERNAL MEDICINE | Admitting: INTERNAL MEDICINE
Payer: MEDICARE

## 2020-12-18 ENCOUNTER — ANESTHESIA (OUTPATIENT)
Dept: ENDOSCOPY | Age: 75
End: 2020-12-18
Payer: MEDICARE

## 2020-12-18 VITALS
HEART RATE: 65 BPM | OXYGEN SATURATION: 97 % | DIASTOLIC BLOOD PRESSURE: 63 MMHG | HEIGHT: 69 IN | RESPIRATION RATE: 16 BRPM | TEMPERATURE: 97.3 F | BODY MASS INDEX: 40.29 KG/M2 | WEIGHT: 272 LBS | SYSTOLIC BLOOD PRESSURE: 143 MMHG

## 2020-12-18 VITALS — OXYGEN SATURATION: 99 % | SYSTOLIC BLOOD PRESSURE: 141 MMHG | DIASTOLIC BLOOD PRESSURE: 69 MMHG

## 2020-12-18 PROCEDURE — 3700000000 HC ANESTHESIA ATTENDED CARE: Performed by: INTERNAL MEDICINE

## 2020-12-18 PROCEDURE — 7100000011 HC PHASE II RECOVERY - ADDTL 15 MIN: Performed by: INTERNAL MEDICINE

## 2020-12-18 PROCEDURE — 3700000001 HC ADD 15 MINUTES (ANESTHESIA): Performed by: INTERNAL MEDICINE

## 2020-12-18 PROCEDURE — 2580000003 HC RX 258: Performed by: NURSE ANESTHETIST, CERTIFIED REGISTERED

## 2020-12-18 PROCEDURE — 3609027000 HC COLONOSCOPY: Performed by: INTERNAL MEDICINE

## 2020-12-18 PROCEDURE — 2500000003 HC RX 250 WO HCPCS: Performed by: NURSE ANESTHETIST, CERTIFIED REGISTERED

## 2020-12-18 PROCEDURE — 6360000002 HC RX W HCPCS: Performed by: NURSE ANESTHETIST, CERTIFIED REGISTERED

## 2020-12-18 PROCEDURE — 7100000010 HC PHASE II RECOVERY - FIRST 15 MIN: Performed by: INTERNAL MEDICINE

## 2020-12-18 PROCEDURE — 2580000003 HC RX 258: Performed by: ANESTHESIOLOGY

## 2020-12-18 RX ORDER — LIDOCAINE HYDROCHLORIDE 20 MG/ML
INJECTION, SOLUTION EPIDURAL; INFILTRATION; INTRACAUDAL; PERINEURAL PRN
Status: DISCONTINUED | OUTPATIENT
Start: 2020-12-18 | End: 2020-12-18 | Stop reason: SDUPTHER

## 2020-12-18 RX ORDER — SODIUM CHLORIDE 9 MG/ML
INJECTION, SOLUTION INTRAVENOUS CONTINUOUS PRN
Status: DISCONTINUED | OUTPATIENT
Start: 2020-12-18 | End: 2020-12-18 | Stop reason: SDUPTHER

## 2020-12-18 RX ORDER — PROPOFOL 10 MG/ML
INJECTION, EMULSION INTRAVENOUS PRN
Status: DISCONTINUED | OUTPATIENT
Start: 2020-12-18 | End: 2020-12-18 | Stop reason: SDUPTHER

## 2020-12-18 RX ORDER — ONDANSETRON 2 MG/ML
4 INJECTION INTRAMUSCULAR; INTRAVENOUS
Status: DISCONTINUED | OUTPATIENT
Start: 2020-12-18 | End: 2020-12-18 | Stop reason: HOSPADM

## 2020-12-18 RX ORDER — SODIUM CHLORIDE 0.9 % (FLUSH) 0.9 %
10 SYRINGE (ML) INJECTION PRN
Status: DISCONTINUED | OUTPATIENT
Start: 2020-12-18 | End: 2020-12-18 | Stop reason: HOSPADM

## 2020-12-18 RX ORDER — SODIUM CHLORIDE 0.9 % (FLUSH) 0.9 %
10 SYRINGE (ML) INJECTION EVERY 12 HOURS SCHEDULED
Status: DISCONTINUED | OUTPATIENT
Start: 2020-12-18 | End: 2020-12-18 | Stop reason: HOSPADM

## 2020-12-18 RX ORDER — SODIUM CHLORIDE 9 MG/ML
INJECTION, SOLUTION INTRAVENOUS CONTINUOUS
Status: DISCONTINUED | OUTPATIENT
Start: 2020-12-18 | End: 2020-12-18 | Stop reason: HOSPADM

## 2020-12-18 RX ADMIN — SODIUM CHLORIDE: 9 INJECTION, SOLUTION INTRAVENOUS at 07:59

## 2020-12-18 RX ADMIN — SODIUM CHLORIDE: 9 INJECTION, SOLUTION INTRAVENOUS at 08:04

## 2020-12-18 RX ADMIN — PROPOFOL 50 MG: 10 INJECTION, EMULSION INTRAVENOUS at 08:15

## 2020-12-18 RX ADMIN — LIDOCAINE HYDROCHLORIDE 40 MG: 20 INJECTION, SOLUTION EPIDURAL; INFILTRATION; INTRACAUDAL; PERINEURAL at 08:12

## 2020-12-18 RX ADMIN — PROPOFOL 50 MG: 10 INJECTION, EMULSION INTRAVENOUS at 08:24

## 2020-12-18 RX ADMIN — PROPOFOL 50 MG: 10 INJECTION, EMULSION INTRAVENOUS at 08:21

## 2020-12-18 RX ADMIN — PROPOFOL 50 MG: 10 INJECTION, EMULSION INTRAVENOUS at 08:18

## 2020-12-18 RX ADMIN — PROPOFOL 100 MG: 10 INJECTION, EMULSION INTRAVENOUS at 08:12

## 2020-12-18 ASSESSMENT — PAIN SCALES - GENERAL
PAINLEVEL_OUTOF10: 0

## 2020-12-18 ASSESSMENT — ENCOUNTER SYMPTOMS: SHORTNESS OF BREATH: 0

## 2020-12-18 ASSESSMENT — PAIN - FUNCTIONAL ASSESSMENT: PAIN_FUNCTIONAL_ASSESSMENT: 0-10

## 2020-12-18 NOTE — ANESTHESIA POSTPROCEDURE EVALUATION
Department of Anesthesiology  Postprocedure Note    Patient: Nelly Greer  MRN: [id number]  YOB: 1945  Date of evaluation: 12/18/2020  Time:  8:51 AM     Procedure Summary     Date: 12/18/20 Room / Location: [address]    Anesthesia Start: Elfredia Cuff Anesthesia Stop: 0831    Procedure: COLORECTAL CANCER SCREENING, HIGH RISK (N/A ) Diagnosis:       History of colon cancer      (HISTORY OF COLON CANCER)    Surgeons: Sharlotte Apley, MD Responsible Provider: Vinh Kay MD    Anesthesia Type: MAC ASA Status: 3          Anesthesia Type: MAC    Maria E Phase I: Maria E Score: 10    Maria E Phase II: Maria E Score: 10    Last vitals: Reviewed and per EMR flowsheets.        Anesthesia Post Evaluation    Patient location during evaluation: bedside  Patient participation: complete - patient participated  Level of consciousness: awake  Pain score: 0  Airway patency: patent  Nausea & Vomiting: no nausea and no vomiting  Complications: no  Cardiovascular status: blood pressure returned to baseline  Respiratory status: acceptable  Hydration status: euvolemic

## 2020-12-18 NOTE — ANESTHESIA PRE PROCEDURE
Department of Anesthesiology  Preprocedure Note       Name:  Marjorie Merino   Age:  67 y.o.  :  1945                                          MRN:  [id number]         Date:  2020      Surgeon: Calixto Gonzalez):  Wenceslao Cardoso MD    Procedure: COLORECTAL CANCER SCREENING, HIGH RISK (N/A )    Medications prior to admission:   Prior to Admission medications    Medication Sig Start Date End Date Taking? Authorizing Provider   tiZANidine (ZANAFLEX) 4 MG tablet Take 1 tablet by mouth 3 times daily as needed (prn spasm) 20   JACIEL Haney   atorvastatin (LIPITOR) 40 MG tablet Take 1 tablet by mouth daily 20   Althea Matos MD   sildenafil (VIAGRA) 50 MG tablet Take 1 tablet by mouth daily as needed for Erectile Dysfunction 20   Althea Matos MD   allopurinol (ZYLOPRIM) 100 MG tablet TAKE ONE TABLET BY MOUTH DAILY 20   Althea Matos MD   lisinopril (PRINIVIL;ZESTRIL) 40 MG tablet Take 20 mg by mouth daily  20   Althea Matos MD   warfarin (COUMADIN) 5 MG tablet Take 2.5 mg by mouth daily at 1800 Patient takes 2.5mg three times weekly on Monday, Wednesday and Friday. He takes no warfarin at all on other days of the week. Historical Provider, MD   flecainide (TAMBOCOR) 100 MG tablet Take 100 mg by mouth 2 times daily  10/26/10   Historical Provider, MD       Current medications:    No current outpatient medications on file. No current facility-administered medications for this visit. 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    Pure hypercholesterolemia E78.00    Depressive disorder, not elsewhere classified F32.9    Obesity E66.9    Essential hypertension, benign I10    Osteoarthritis M19.90    Status post total knee replacement Z96.659    Low back pain M54.5    Chest pain R07.9    Otalgia H92.09    Paroxysmal atrial fibrillation (HCC) I48.0    Pain of toe of right foot M79.674    URTI (acute upper respiratory infection) J06.9    Rectal bleeding K62.5    Cancer of ascending colon (HCC) C18.2    Colon cancer (HCC) C18.9    S/P partial colectomy Z90.49    Sepsis (HCC) A41.9    Colon cancer metastasized to intra-abdominal lymph node (HCC) C18.9, C77.2    Liver mass R16.0    Major depression, single episode, in complete remission (HCC) F32.5    Body mass index (BMI) 40.0-44.9, adult Z68.41    History of bilateral knee arthroplasty Z96.653    Muscle weakness (generalized) M62.81    Balance problem R26.89       Past Medical History:        Diagnosis Date    Arthritis     Atrial fibrillation (HCC)     CAD (coronary artery disease)     Colon cancer (HCC)     colon 2019 dec    CPAP (continuous positive airway pressure) dependence     Difficult intubation     many years ago, has been OK recently     Gout     High blood pressure     Hyperlipidemia     Sleep apnea        Past Surgical History:        Procedure Laterality Date    ABDOMEN SURGERY      COLECTOMY      COLONOSCOPY  02/12/2014    sm sessile polyp  hemorrhoids--kassi 2019---dr mora     COLONOSCOPY N/A 10/25/2019    COLONOSCOPY WITH BIOPSY performed by Anurag De Oliveira MD at [email or web address] 12/13/2019    LAPAROSCOPIC RIGHT COLECTOMY performed by Aretha Ramos MD at [facility] Bilateral     knees    KNEE ARTHROSCOPY  10.1994    LIVER RESECTION N/A 12/13/2019 DIAGNOSTIC LAPAROSCOPY, LIVER ULTRASOUND performed by Lico Vazquez MD at [address]  10/28/2016    dr dalal==[facility]     PORT SURGERY N/A 2020    SINGLE LUMEN PORT PLACEMENT WITH FLOURO WITH C-ARM performed by Cristiano Aguirre MD at [address] Right 11    TKR on right knee    TOTAL KNEE ARTHROPLASTY Left 12    Lt TKR---dr Walter Villagomez        Social History:    Social History     Tobacco Use    Smoking status: Former Smoker     Packs/day: 0.25     Years: 12.00     Pack years: 3.00     Types: Cigarettes     Start date: 1960     Quit date: 1971     Years since quittin.3    Smokeless tobacco: Never Used   Substance Use Topics    Alcohol use: Yes     Comment: occ                                 Counseling given: Not Answered      Vital Signs (Current): There were no vitals filed for this visit.                                            BP Readings from Last 3 Encounters:   20 118/80   20 138/82   20 130/78       NPO Status:                                                                                 BMI:   Wt Readings from Last 3 Encounters:   12/15/20 279 lb (126.6 kg)   20 270 lb (122.5 kg)   20 253 lb (114.8 kg)     There is no height or weight on file to calculate BMI.    CBC:   Lab Results   Component Value Date    WBC 8.6 2020    RBC 4.83 2020    HGB 15.0 2020    HCT 44.0 2020    MCV 91.0 2020    RDW 14.2 2020     2020       CMP:   Lab Results   Component Value Date     2020    K 4.6 2020    K 3.7 2019     2020    CO2 28 2020    BUN 23 2020    CREATININE 0.8 2020    GFRAA >60 2020    GFRAA >60 2012    AGRATIO 1.4 2020    LABGLOM >60 2020    LABGLOM 88 2013    LABGLOM 73 2013    GLUCOSE 105 2020    PROT 6.6 2020    PROT 7.1 2013

## 2020-12-18 NOTE — H&P
[facility]   Pre-operative History and Physical    Patient: Agueda Guerra  : 1945  Acct#: [de-identified]    History Obtained From: electronic medical record    HISTORY OF PRESENT ILLNESS  Procedure:Colonoscopy  Indications:history of colon cancer  Past Medical History:        Diagnosis Date    Arthritis     [facility] fibrillation ([facility]     CAD (coronary artery disease)     Colon cancer ([facility]     colon  dec    CPAP (continuous positive airway pressure) dependence     Difficult intubation     many years ago, has been OK recently     Gout     High blood pressure     Hyperlipidemia     Sleep apnea      Past Surgical History:        Procedure Laterality Date    ABDOMEN SURGERY      COLECTOMY      COLONOSCOPY  2014    sm sessile polyp  hemorrhoids--kassi 2019---dr mora     COLONOSCOPY N/A 10/25/2019    COLONOSCOPY WITH BIOPSY performed by Sushila Guthrie MD at [address] [facility] Right 2019    LAPAROSCOPIC RIGHT COLECTOMY performed by Pia Chavez MD at [address] [facility] Bilateral     knees    KNEE ARTHROSCOPY  10.1994    LIVER RESECTION N/A 2019    DIAGNOSTIC LAPAROSCOPY, LIVER ULTRASOUND performed by Francoise Rivera MD at [facility]  10/28/2016    dr dalal==[facility]     PORT SURGERY N/A 2020    SINGLE LUMEN PORT PLACEMENT WITH FLOURO WITH C-ARM performed by Pia Chavez MD at [address] [facility] Right 11    TKR on right knee    TOTAL KNEE ARTHROPLASTY Left 12    Lt TKR---dr Chapin Del Castillo      Medications prior to admission:   Prior to Admission medications    Medication Sig Start Date End Date Taking?  Authorizing Provider   tiZANidine (ZANAFLEX) 4 MG tablet Take 1 tablet by mouth 3 times daily as needed (prn spasm) 20  Yes JACIEL Sales   atorvastatin (LIPITOR) 40 MG tablet Take 1 tablet by mouth daily 20  Yes Patricia Wu MD sildenafil (VIAGRA) 50 MG tablet Take 1 tablet by mouth daily as needed for Erectile Dysfunction 20  Yes Tiffani Mak MD   allopurinol (ZYLOPRIM) 100 MG tablet TAKE ONE TABLET BY MOUTH DAILY 20  Yes Tiffani Mak MD   lisinopril (PRINIVIL;ZESTRIL) 40 MG tablet Take 20 mg by mouth daily  20  Yes Tiffani Mak MD   warfarin (COUMADIN) 5 MG tablet Take 2.5 mg by mouth daily at 1800 Patient takes 2.5mg three times weekly on Monday, Wednesday and Friday. He takes no warfarin at all on other days of the week.    Yes Historical Provider, MD   flecainide (TAMBOCOR) 100 MG tablet Take 100 mg by mouth 2 times daily  10/26/10  Yes Historical Provider, MD     Allergies:   Statins    Social History     Socioeconomic History    Marital status:      Spouse name: Not on file    Number of children: 3    Years of education: Not on file    Highest education level: Not on file   Occupational History    Occupation: retired   Social Needs    Financial resource strain: Not on file    Food insecurity     Worry: Not on file     Inability: Not on file   [employer] needs     Medical: Not on file     Non-medical: Not on file   Tobacco Use    Smoking status: Former Smoker     Packs/day: 0.25     Years: 12.00     Pack years: 3.00     Types: Cigarettes     Start date: 1960     Quit date: 1971     Years since quittin.3    Smokeless tobacco: Never Used   Substance and Sexual Activity    Alcohol use: Yes     Comment: occ     Drug use: No    Sexual activity: Not Currently   Lifestyle    Physical activity     Days per week: Not on file     Minutes per session: Not on file    Stress: Not on file   Relationships    Social connections     Talks on phone: Not on file     Gets together: Not on file     Attends Confucianism service: Not on file     Active member of club or organization: Not on file     Attends meetings of clubs or organizations: Not on file Relationship status: Not on file    Intimate partner violence     Fear of current or ex partner: Not on file     Emotionally abused: Not on file     Physically abused: Not on file     Forced sexual activity: Not on file   Other Topics Concern    Not on file   Social History Narrative    Not on file     Family History   Problem Relation Age of Onset    Diabetes Mother     High Blood Pressure Mother     Cancer Mother     Diabetes Father     High Blood Pressure Father     Cancer Father     Heart Disease Other     High Blood Pressure Other          PHYSICAL EXAM:      BP (!) 186/73   Pulse 84   Temp 97.4 °F (36.3 °C) (Temporal)   Resp 16   Ht 5' 9\" (1.753 m)   Wt 272 lb (123.4 kg)   SpO2 95%   BMI 40.17 kg/m²  I        Heart:normal    Lungs: normal    Abdomen: normal      ASA Grade:  See anesthesia note      ASSESSMENT AND PLAN:    1. Procedure options, risks and benefits reviewed with patient and expresses understanding.

## 2021-01-21 ENCOUNTER — HOSPITAL ENCOUNTER (OUTPATIENT)
Dept: MRI IMAGING | Age: 76
Discharge: HOME | End: 2021-01-21
Payer: MEDICARE

## 2021-01-21 DIAGNOSIS — C18.2 MALIGNANT NEOPLASM OF ASCENDING COLON (HCC): ICD-10-CM

## 2021-01-21 PROCEDURE — 6360000004 HC RX CONTRAST MEDICATION: Performed by: INTERNAL MEDICINE

## 2021-01-21 PROCEDURE — 74183 MRI ABD W/O CNTR FLWD CNTR: CPT

## 2021-01-21 PROCEDURE — A9581 GADOXETATE DISODIUM INJ: HCPCS | Performed by: INTERNAL MEDICINE

## 2021-01-21 RX ADMIN — GADOXETATE DISODIUM 10 ML: 181.43 INJECTION, SOLUTION INTRAVENOUS at 10:05

## 2021-02-26 PROBLEM — M79.605 PAIN OF LEFT LOWER EXTREMITY: Status: ACTIVE | Noted: 2021-02-26

## 2021-04-12 PROBLEM — H92.01 RIGHT EAR PAIN: Status: ACTIVE | Noted: 2021-04-12

## 2021-05-03 ENCOUNTER — OFFICE VISIT (OUTPATIENT)
Dept: SLEEP MEDICINE | Age: 76
End: 2021-05-03
Payer: MEDICARE

## 2021-05-03 VITALS
RESPIRATION RATE: 16 BRPM | BODY MASS INDEX: 42.21 KG/M2 | HEIGHT: 69 IN | TEMPERATURE: 97.9 F | OXYGEN SATURATION: 96 % | DIASTOLIC BLOOD PRESSURE: 70 MMHG | HEART RATE: 56 BPM | WEIGHT: 285 LBS | SYSTOLIC BLOOD PRESSURE: 122 MMHG

## 2021-05-03 DIAGNOSIS — G47.33 OSA TREATED WITH BIPAP: Primary | ICD-10-CM

## 2021-05-03 DIAGNOSIS — Z99.89 DEPENDENCE ON OTHER ENABLING MACHINES AND DEVICES: ICD-10-CM

## 2021-05-03 PROCEDURE — 99213 OFFICE O/P EST LOW 20 MIN: CPT | Performed by: PSYCHIATRY & NEUROLOGY

## 2021-05-03 ASSESSMENT — SLEEP AND FATIGUE QUESTIONNAIRES
HOW LIKELY ARE YOU TO NOD OFF OR FALL ASLEEP WHILE SITTING AND TALKING TO SOMEONE: 0
HOW LIKELY ARE YOU TO NOD OFF OR FALL ASLEEP WHEN YOU ARE A PASSENGER IN A CAR FOR AN HOUR WITHOUT A BREAK: 0
HOW LIKELY ARE YOU TO NOD OFF OR FALL ASLEEP WHILE SITTING INACTIVE IN A PUBLIC PLACE: 0
HOW LIKELY ARE YOU TO NOD OFF OR FALL ASLEEP WHILE WATCHING TV: 2

## 2021-05-03 ASSESSMENT — ENCOUNTER SYMPTOMS: APNEA: 0

## 2021-05-03 NOTE — PROGRESS NOTES
Sindhu Gong         : 1945        PHONE: [phone] (home)   [] [address]     [] [address]      [] Idalia     [][employer]ilda[employer]    [] Tangela Pruitt  [] [facility]     [x] Other: [facility] equipment     Diagnosis: [x] RENETTA (G47.33) [] CSA (G47.31) [] Apnea (G47.30)   Length of Need: [] 12 Months [x] 99 Months [] Other:    Machine (DENYS!): [] Respironics Dream Station      Auto [] ResMed AirSense     Auto [] Other:     []  CPAP () [] Bilevel ()   Mode: [] Auto [] Spontaneous    Mode: [] Auto [] Spontaneous                     Please change the VPAP to 15/10 cm            Humidifier: [] Heated ()        [x] Water chamber replacement ()/ 1 per 6 months        Mask:   [x] Nasal () /1 per 3 months [] Full Face () /1 per 3 months   [x] Patient choice -Size and fit mask [] Patient Choice - Size and fit mask   [] Dispense:  [] Dispense:    [x] Headgear () / 1 per 3 months [] Headgear () / 1 per 3 months   [x] Replacement Nasal Cushion ()/2 per month [] Interface Replacement ()/1 per month   [x] Replacement Nasal Pillows ()/2 per month         Tubing: [x] Heated ()/1 per 3 months    [] Standard ()/1 per 3 months [] Other:           Filters: [x] Non-disposable ()/1 per 6 months     [x] Ultra-Fine, Disposable ()/2 per month        Miscellaneous: [] Chin Strap ()/ 1 per 6 months [] O2 bleed-in:       LPM   [] Oximetry on CPAP/Bilevel []  Other:          Start Order Date: 21    MEDICAL JUSTIFICATION:  I, the undersigned, certify that the above prescribed supplies are medically necessary for this patients wellbeing. In my opinion, the supplies are both reasonable and necessary in reference to accepted standards of medicalpractice in treatment of this patients condition.     Melissa Gracia MD      NPI: [id number]       Order Signed Date: 21    Electronically signed by Melissa Gracia MD on 5/3/2021 at 9:05 AM

## 2021-07-20 ENCOUNTER — HOSPITAL ENCOUNTER (OUTPATIENT)
Dept: CT IMAGING | Age: 76
Discharge: HOME | End: 2021-07-20
Payer: MEDICARE

## 2021-07-20 DIAGNOSIS — C18.2 CANCER OF ASCENDING COLON (HCC): ICD-10-CM

## 2021-07-20 LAB
GFR AFRICAN AMERICAN: >60
GFR NON-AFRICAN AMERICAN: >60
PERFORMED ON: NORMAL
POC CREATININE: 0.9 MG/DL (ref 0.8–1.3)
POC SAMPLE TYPE: NORMAL

## 2021-07-20 PROCEDURE — 82565 ASSAY OF CREATININE: CPT

## 2021-07-20 PROCEDURE — 74177 CT ABD & PELVIS W/CONTRAST: CPT

## 2021-07-20 PROCEDURE — 6360000004 HC RX CONTRAST MEDICATION: Performed by: INTERNAL MEDICINE

## 2021-07-20 RX ADMIN — IOPAMIDOL 75 ML: 755 INJECTION, SOLUTION INTRAVENOUS at 08:32

## 2021-07-20 RX ADMIN — IOHEXOL 50 ML: 240 INJECTION, SOLUTION INTRATHECAL; INTRAVASCULAR; INTRAVENOUS; ORAL at 08:32

## 2021-07-28 ENCOUNTER — TELEPHONE (OUTPATIENT)
Dept: SURGERY | Age: 76
End: 2021-07-28

## 2021-07-28 NOTE — LETTER
[address] [place] [address]  [phone]        Fax  [phone]    Nikos Bravo MD    Instructions for Outpatient Surgery    Patient Name: Tyra Anthony:    [facility] [id number] [address]    Date of Surgery: Tuesday 9/14/21 at 7:15AM  Time to arrive: 5:30AM at the [address] CAREFULLY BEFORE SURGERY    1. __x__ Do NOT eat or drink anything after midnight the evening before surgery. Food or drink intake of any kind will result in the cancellation of surgery. 2. _x___ STOP all Blood Thinning medications AND Anti inflammatories; Aspirin, Coumadin & Plavix etc. 3 days prior to surgery. [place])    AFTER SURGERY  1. A responsible adult is REQUIRED to accompany you to the hospital and remain there for your surgery. If this arrangement has not been made at the time of your surgery, your surgery may be cancelled  2. You should not be left alone for 24  48 hours following surgery. 3. You should not drive, sign any important documents or make any important decisions until you have fully recovered from anesthesia (approximately 24  48 hours) AND are off all narcotic pain medications. · Any paperwork needing completion for either your employer or insurance company can be faxed, mailed or dropped off at our office to my attention. Please allow 7 business days for the paperwork to be completed. You will be contacted once it has been completed at which time you will be able to pick it up or have it mailed. · NOTE: Due to HIPPA policy, completed paperwork cannot be faxed and per OLIVIA DALAL EMMA [facility] of [birth year], Fees for form completion may apply. If you have any questions, please feel free to call the number above.     Nikos Bravo MD

## 2021-07-28 NOTE — TELEPHONE ENCOUNTER
Patient has been scheduled for:    Procedure: Port Removal  Date: Tuesday 9/14/21  Time: 7:15AM  Arrival: 5:30AM  Hospital: [facility]     Covid: #2 4/2021  ASA?: Eliquis 3 days, will call monitoring physician  Prep? NPO 12AM    Pre-op? N/A    Post-op Appt? N/A    Patient advised they will need a . Orders faxed to surgery scheduling.     Instructions have been emailed to:   [email or web address]    *Tried to schedule removal earlier, patient requested to wait till September because it's swimming season and he wants to be able to use his pool and not worry about healing time after removal.

## 2021-09-08 NOTE — PROGRESS NOTES
[facility] patients having surgery or anesthesia are required to be Covid tested OR to have been vaccinated at least 14 days prior to your procedure. It is very important to return our call to [phone] and notify the staff of your last vaccination date otherwise you will be required to complete Covid PCR test within the 5-6 days prior to surgery & quarantine. The results will need to be faxed to PreAdmission Testing at [phone]. PRIOR TO PROCEDURE DATE:        1. PLEASE FOLLOW ANY  GUIDELINES/ INSTRUCTIONS PRIOR TO YOUR PROCEDURE AS ADVISED BY YOUR SURGEON. 2. Arrange for someone to drive you home and be with you for the first 24 hours after discharge for your safety after your procedure for which you received sedation. Ensure it is someone we can share information with regarding your discharge. 3. You must contact your surgeon for instructions IF:   You are taking any blood thinners, aspirin, anti-inflammatory or vitamin E.   There is a change in your physical condition such as a cold, fever, rash, cuts, sores or any other infection, especially near your surgical site. 4. Do not drink alcohol the day before or day of your procedure. 5. A Pre-op History and Physical for surgery MUST be completed by your Physician or Urgent Care within 30 days of your procedure date. Please bring a copy with you on the day of your procedure and along with any other testing performed. THE DAY OF YOUR PROCEDURE:  1. Follow instructions for ARRIVAL TIME as DIRECTED BY YOUR SURGEON. 2. Enter the MAIN entrance from [address] and follow the signs to the free [employer] or [employer] parking (offered free of charge 6am-5pm). 3. Enter the Main Entrance of the hospital (do not enter from the lower level of the parking garage). Upon entrance, check in with the  at the main desk on your left. If no one is available at the desk, proceed into the [facility] Waiting Room and go through the door directly into the [facility]. There is a Check-in desk ACROSS from Room 5 (marked with a sign hanging from the ceiling). The phone number for the surgery center is [phone]. 4. Please call [phone] option #2 option #2 if you have not been preregistered yet. On the day of your procedure bring your insurance card and photo ID. You will be registered at your bedside once brought back to your room. 5. DO NOT EAT ANYTHING eight hours prior to your arrival for surgery. May have 8 ounces of water 4 hours prior to your arrival for surgery. NOTE: ALL Gastric, Bariatric and Bowel surgery patients MUST follow their surgeon's instructions. 6. MEDICATIONS    Take the following medications with a SMALL sip of water: amlodipine, flecainide .  Bariatric patient's call surgeon if on diabetic medications as some need to be stopped 1 week preop   Use your usual dose of inhalers the morning of surgery. BRING your rescue inhaler with you to hospital.    Anesthesia does NOT want you to take insulin the morning of surgery. They will control your blood sugar while you are at the hospital. Please contact your ordering physician for instructions regarding your insulin the night before your procedure. If you have an insulin pump, please keep it set on basal rate. 7. Do not swallow water when brushing teeth. No gum, candy, mints or ice chips. Refrain from smoking or at least decrease the amount. 8. Dress in loose, comfortable clothing appropriate for redressing after your procedure. Do not wear jewelry (including body piercings), make-up (especially NO eye make-up), fingernail polish (NO toenail polish if foot/leg surgery), lotion, powders or metal hairclips. 9. Dentures, glasses, or contacts will need to be removed before your procedure.  Bring cases for your glasses, contacts, dentures, or hearing aids to protect them while you are in surgery. 10. If you use a CPAP, please bring it with you on the day of your procedure. 11. We recommend that valuable personal  belongings such as cash, cell phones, e-tablets or jewelry, be left at home during your stay. The hospital will not be responsible for valuables that are not secured in the hospital safe. However, if your insurance requires a co-pay, you may want to bring a method of payment, i.e. Check or credit card, if you wish to pay your co-pay the day of surgery. 12. If you are to stay overnight, you may bring a bag with personal items. Please have any large items you may need brought in by your family after your arrival to your hospital room. 15. If you have a Living Will or Durable Power of , please bring a copy on the day of your procedure. 15. With your permission, one family member may accompany you while you are being prepared for surgery. Once you are ready, additional family members may join you. HOW WE KEEP YOU SAFE and WORK TO PREVENT SURGICAL SITE INFECTIONS:  1. Health care workers should always check your ID bracelet to verify your name and birth date. You will be asked many times to state your name, date of birth, and allergies. 2. Health care workers should always clean their hands with soap or alcohol gel before providing care to you. It is okay to ask anyone if they cleaned their hands before they touch you. 3. You will be actively involved in verifying the type of procedure you are having and ensuring the correct surgical site. This will be confirmed multiple times prior to your procedure. Do NOT hank your surgery site UNLESS instructed to by your surgeon. 4. Do not shave or wax for 72 hours prior to procedure near your operative site. Shaving with a razor can irritate your skin and make it easier to develop an infection.  On the day of your procedure, any hair that needs to be removed near the surgical site will be clipped by a healthcare worker using a special clippers designed to avoid skin irritation. 5. When you are in the operating room, your surgical site will be cleansed with a special soap, and in most cases, you will be given an antibiotic before the surgery begins. What to expect AFTER YOUR PROCEDURE:  1. Immediately following your procedure, your will be taken to the PACU for the first phase of your recovery. Your nurse will help you recover from any potential side effects of anesthesia, such as extreme drowsiness, changes in your vital signs or breathing patterns. Nausea, headache, muscle aches, or sore throat may also occur after anesthesia. Your nurse will help you manage these potential side effects. 2. For comfort and safety, arrange to have someone at home with you for the first 24 hours after discharge. 3. You and your family will be given written instructions about your diet, activity, dressing care, medications, and return visits. 4. Once at home, should issues with nausea, pain, or bleeding occur, or should you notice any signs of infection, you should call your surgeon. 5. Always clean your hands before and after caring for your wound. Do not let your family touch your surgery site without cleaning their hands. 6. Narcotic pain medications can cause significant constipation. You may want to add a stool softener to your postoperative medication schedule or speak to your surgeon on how best to manage this SIDE EFFECT. SPECIAL INSTRUCTIONS     Thank you for allowing us to care for you. We strive to exceed your expectations in the delivery of care and service provided to you and your family. If you need to contact the [place] [address] staff for any reason, please call us at [phone]    Instructions reviewed with patient during preadmission testing phone interview.   Leanne Garcia RN.9/8/2021 .4:26 PM      ADDITIONAL EDUCATIONAL INFORMATION REVIEWED PER PHONE WITH YOU AND/OR YOUR FAMILY:    No Antibacterial Soap

## 2021-09-13 ENCOUNTER — ANESTHESIA EVENT (OUTPATIENT)
Dept: OPERATING ROOM | Age: 76
End: 2021-09-13
Payer: MEDICARE

## 2021-09-13 NOTE — ANESTHESIA PRE PROCEDURE
COLECTOMY      COLONOSCOPY  2014    sm sessile polyp  hemorrhoids--kassi ---dr mora     COLONOSCOPY N/A 10/25/2019    COLONOSCOPY WITH BIOPSY performed by Francisco Javier Blair MD at [address] [facility]. N/A 2020    COLORECTAL CANCER SCREENING, HIGH RISK performed by Francisco Javier Blair MD at [address] Right 2019    LAPAROSCOPIC RIGHT COLECTOMY performed by Pablo Pierson MD at [address] [facility] Bilateral     knees    KNEE ARTHROSCOPY  10.    LIVER RESECTION N/A 2019    DIAGNOSTIC LAPAROSCOPY, LIVER ULTRASOUND performed by Yadira Chavez MD at [facility] [address]  10/28/2016    dr dalal==[facility]     PORT SURGERY N/A 2020    SINGLE LUMEN PORT PLACEMENT WITH FLOURO WITH C-ARM performed by Pablo Pierson MD at [address] [facility] [facility] [address] Right 11    TKR on right knee    TOTAL KNEE ARTHROPLASTY Left 12    Lt TKR---dr Subhash Becerra      Social History     Tobacco Use    Smoking status: Former Smoker     Packs/day: 0.25     Years: 12.00     Pack years: 3.00     Types: Cigarettes     Start date: 1960     Quit date: 1971     Years since quittin.1    Smokeless tobacco: Never Used   Vaping Use    Vaping Use: Never used   Substance Use Topics    Alcohol use: Yes     Comment: occ     Drug use: No     Medications  Current Outpatient Medications on File Prior to Visit   Medication Sig Dispense Refill    amLODIPine (NORVASC) 5 MG tablet Take 5 mg by mouth daily      allopurinol (ZYLOPRIM) 100 MG tablet TAKE ONE TABLET BY MOUTH DAILY 90 tablet 2    apixaban (ELIQUIS) 5 MG TABS tablet Take 1 tablet by mouth 2 times daily 180 tablet 1    atorvastatin (LIPITOR) 40 MG tablet Take 1 tablet by mouth daily 90 tablet 4    flecainide (TAMBOCOR) 100 MG tablet Take 100 mg by mouth 2 times daily        No current facility-administered medications on file prior to visit.      Current Outpatient Medications   Medication Sig Dispense Refill    amLODIPine (NORVASC) 5 MG tablet Take 5 mg by mouth daily      allopurinol (ZYLOPRIM) 100 MG tablet TAKE ONE TABLET BY MOUTH DAILY 90 tablet 2    apixaban (ELIQUIS) 5 MG TABS tablet Take 1 tablet by mouth 2 times daily 180 tablet 1    atorvastatin (LIPITOR) 40 MG tablet Take 1 tablet by mouth daily 90 tablet 4    flecainide (TAMBOCOR) 100 MG tablet Take 100 mg by mouth 2 times daily        No current facility-administered medications for this visit. Vital Signs (Current)   There were no vitals filed for this visit. BP Readings from Last 3 Encounters:   05/03/21 122/70   04/12/21 138/64   02/26/21 132/80     Vital Signs Statistics (for past 48 hrs)     No data recorded  BP Readings from Last 3 Encounters:   05/03/21 122/70   04/12/21 138/64   02/26/21 132/80       BMI  There is no height or weight on file to calculate BMI. Estimated body mass index is 41.35 kg/m² as calculated from the following:    Height as of 9/8/21: 5' 9\" (1.753 m). Weight as of 9/8/21: 280 lb (127 kg).     CBC   Lab Results   Component Value Date    WBC 8.6 12/11/2020    RBC 4.83 12/11/2020    HGB 15.0 12/11/2020    HCT 44.0 12/11/2020    MCV 91.0 12/11/2020    RDW 14.2 12/11/2020     12/11/2020     CMP    Lab Results   Component Value Date     12/11/2020    K 4.6 12/11/2020    K 3.7 12/21/2019     12/11/2020    CO2 28 12/11/2020    BUN 23 12/11/2020    CREATININE 0.9 07/20/2021    CREATININE 0.8 12/11/2020    GFRAA >60 07/20/2021    GFRAA >60 02/21/2012    AGRATIO 1.4 12/11/2020    LABGLOM >60 07/20/2021    LABGLOM 88 03/01/2013    LABGLOM 73 03/01/2013    GLUCOSE 105 12/11/2020    PROT 6.6 12/11/2020    PROT 7.1 03/01/2013    CALCIUM 9.0 12/11/2020    BILITOT 0.4 12/11/2020    ALKPHOS 96 12/11/2020    AST 25 12/11/2020    ALT 18 12/11/2020     BMP    Lab Results   Component Value Date     12/11/2020    K 4.6 12/11/2020 K 3.7 12/21/2019     12/11/2020    CO2 28 12/11/2020    BUN 23 12/11/2020    CREATININE 0.9 07/20/2021    CREATININE 0.8 12/11/2020    CALCIUM 9.0 12/11/2020    GFRAA >60 07/20/2021    GFRAA >60 02/21/2012    LABGLOM >60 07/20/2021    LABGLOM 88 03/01/2013    LABGLOM 73 03/01/2013    GLUCOSE 105 12/11/2020     POCGlucose  No results for input(s): GLUCOSE in the last 72 hours. Coags    Lab Results   Component Value Date    PROTIME 14.9 12/30/2019    INR 1.28 12/30/2019    APTT 26.8 81/07/4834     HCG (If Applicable) No results found for: PREGTESTUR, PREGSERUM, HCG, HCGQUANT   ABGs No results found for: PHART, PO2ART, ULN8VJZ, KPI3BQN, BEART, K3SDEYBZ   Type & Screen (If Applicable)  Lab Results   Component Value Date    LABABO B 02/17/2012    LABRH Positive 02/17/2012                            BMI: Wt Readings from Last 3 Encounters:       NPO Status:                          Anesthesia Evaluation  Patient summary reviewed and Nursing notes reviewed   history of anesthetic complications: difficult airway. Airway: Mallampati: III  TM distance: >3 FB   Neck ROM: limited  Mouth opening: > = 3 FB Dental: normal exam         Pulmonary:   (+) sleep apnea: on CPAP,                             Cardiovascular:    (+) hypertension:, CAD:, dysrhythmias: atrial fibrillation, hyperlipidemia    (-) pacemaker and CABG/stent                Neuro/Psych:   (+) psychiatric history:            GI/Hepatic/Renal:   (+) morbid obesity         ROS comment: Colon CA s/p hemicolectomy. Endo/Other:    (+) : arthritis: OA., malignancy/cancer. (-) diabetes mellitus, hypothyroidism, hyperthyroidism               Abdominal:   (+) obese,           Vascular:     - DVT and PE. Other Findings:               Anesthesia Plan      MAC     ASA 3       Induction: intravenous. Anesthetic plan and risks discussed with patient. Plan discussed with CRNA.

## 2021-09-14 ENCOUNTER — ANESTHESIA (OUTPATIENT)
Dept: OPERATING ROOM | Age: 76
End: 2021-09-14
Payer: MEDICARE

## 2021-09-14 ENCOUNTER — HOSPITAL ENCOUNTER (OUTPATIENT)
Age: 76
Setting detail: OUTPATIENT SURGERY
Discharge: HOME | End: 2021-09-14
Attending: SURGERY | Admitting: SURGERY
Payer: MEDICARE

## 2021-09-14 VITALS — SYSTOLIC BLOOD PRESSURE: 141 MMHG | OXYGEN SATURATION: 97 % | DIASTOLIC BLOOD PRESSURE: 70 MMHG

## 2021-09-14 VITALS
BODY MASS INDEX: 41.47 KG/M2 | RESPIRATION RATE: 16 BRPM | DIASTOLIC BLOOD PRESSURE: 75 MMHG | SYSTOLIC BLOOD PRESSURE: 133 MMHG | TEMPERATURE: 96.8 F | HEART RATE: 53 BPM | HEIGHT: 69 IN | OXYGEN SATURATION: 96 % | WEIGHT: 280 LBS

## 2021-09-14 DIAGNOSIS — C18.2 MALIGNANT NEOPLASM OF ASCENDING COLON (HCC): ICD-10-CM

## 2021-09-14 PROCEDURE — 3600000002 HC SURGERY LEVEL 2 BASE: Performed by: SURGERY

## 2021-09-14 PROCEDURE — 2580000003 HC RX 258: Performed by: ANESTHESIOLOGY

## 2021-09-14 PROCEDURE — 3600000012 HC SURGERY LEVEL 2 ADDTL 15MIN: Performed by: SURGERY

## 2021-09-14 PROCEDURE — 2709999900 HC NON-CHARGEABLE SUPPLY: Performed by: SURGERY

## 2021-09-14 PROCEDURE — 3700000001 HC ADD 15 MINUTES (ANESTHESIA): Performed by: SURGERY

## 2021-09-14 PROCEDURE — 88300 SURGICAL PATH GROSS: CPT

## 2021-09-14 PROCEDURE — 3700000000 HC ANESTHESIA ATTENDED CARE: Performed by: SURGERY

## 2021-09-14 PROCEDURE — 7100000000 HC PACU RECOVERY - FIRST 15 MIN: Performed by: SURGERY

## 2021-09-14 PROCEDURE — 6360000002 HC RX W HCPCS: Performed by: NURSE ANESTHETIST, CERTIFIED REGISTERED

## 2021-09-14 PROCEDURE — 2500000003 HC RX 250 WO HCPCS: Performed by: NURSE ANESTHETIST, CERTIFIED REGISTERED

## 2021-09-14 PROCEDURE — 7100000010 HC PHASE II RECOVERY - FIRST 15 MIN: Performed by: SURGERY

## 2021-09-14 PROCEDURE — 7100000001 HC PACU RECOVERY - ADDTL 15 MIN: Performed by: SURGERY

## 2021-09-14 PROCEDURE — 2500000003 HC RX 250 WO HCPCS: Performed by: SURGERY

## 2021-09-14 PROCEDURE — 36590 REMOVAL TUNNELED CV CATH: CPT | Performed by: SURGERY

## 2021-09-14 PROCEDURE — 2580000003 HC RX 258: Performed by: SURGERY

## 2021-09-14 PROCEDURE — 7100000011 HC PHASE II RECOVERY - ADDTL 15 MIN: Performed by: SURGERY

## 2021-09-14 RX ORDER — OXYCODONE HYDROCHLORIDE AND ACETAMINOPHEN 5; 325 MG/1; MG/1
1 TABLET ORAL
Status: DISCONTINUED | OUTPATIENT
Start: 2021-09-14 | End: 2021-09-14 | Stop reason: HOSPADM

## 2021-09-14 RX ORDER — LIDOCAINE HCL/PF 100 MG/5ML
SYRINGE (ML) INJECTION PRN
Status: DISCONTINUED | OUTPATIENT
Start: 2021-09-14 | End: 2021-09-14 | Stop reason: SDUPTHER

## 2021-09-14 RX ORDER — FENTANYL CITRATE 50 UG/ML
INJECTION, SOLUTION INTRAMUSCULAR; INTRAVENOUS PRN
Status: DISCONTINUED | OUTPATIENT
Start: 2021-09-14 | End: 2021-09-14 | Stop reason: SDUPTHER

## 2021-09-14 RX ORDER — SODIUM CHLORIDE, SODIUM LACTATE, POTASSIUM CHLORIDE, CALCIUM CHLORIDE 600; 310; 30; 20 MG/100ML; MG/100ML; MG/100ML; MG/100ML
INJECTION, SOLUTION INTRAVENOUS CONTINUOUS
Status: DISCONTINUED | OUTPATIENT
Start: 2021-09-14 | End: 2021-09-14 | Stop reason: HOSPADM

## 2021-09-14 RX ORDER — HYDRALAZINE HYDROCHLORIDE 20 MG/ML
5 INJECTION INTRAMUSCULAR; INTRAVENOUS EVERY 10 MIN PRN
Status: DISCONTINUED | OUTPATIENT
Start: 2021-09-14 | End: 2021-09-14 | Stop reason: HOSPADM

## 2021-09-14 RX ORDER — FENTANYL CITRATE 50 UG/ML
25 INJECTION, SOLUTION INTRAMUSCULAR; INTRAVENOUS EVERY 5 MIN PRN
Status: DISCONTINUED | OUTPATIENT
Start: 2021-09-14 | End: 2021-09-14 | Stop reason: HOSPADM

## 2021-09-14 RX ORDER — BUPIVACAINE HYDROCHLORIDE 5 MG/ML
INJECTION, SOLUTION EPIDURAL; INTRACAUDAL PRN
Status: DISCONTINUED | OUTPATIENT
Start: 2021-09-14 | End: 2021-09-14 | Stop reason: ALTCHOICE

## 2021-09-14 RX ORDER — MAGNESIUM HYDROXIDE 1200 MG/15ML
LIQUID ORAL CONTINUOUS PRN
Status: COMPLETED | OUTPATIENT
Start: 2021-09-14 | End: 2021-09-14

## 2021-09-14 RX ORDER — PROMETHAZINE HYDROCHLORIDE 25 MG/ML
6.25 INJECTION, SOLUTION INTRAMUSCULAR; INTRAVENOUS
Status: DISCONTINUED | OUTPATIENT
Start: 2021-09-14 | End: 2021-09-14 | Stop reason: HOSPADM

## 2021-09-14 RX ORDER — LABETALOL HYDROCHLORIDE 5 MG/ML
5 INJECTION, SOLUTION INTRAVENOUS EVERY 10 MIN PRN
Status: DISCONTINUED | OUTPATIENT
Start: 2021-09-14 | End: 2021-09-14 | Stop reason: HOSPADM

## 2021-09-14 RX ORDER — PROPOFOL 10 MG/ML
INJECTION, EMULSION INTRAVENOUS PRN
Status: DISCONTINUED | OUTPATIENT
Start: 2021-09-14 | End: 2021-09-14 | Stop reason: SDUPTHER

## 2021-09-14 RX ORDER — MEPERIDINE HYDROCHLORIDE 25 MG/ML
12.5 INJECTION INTRAMUSCULAR; INTRAVENOUS; SUBCUTANEOUS EVERY 5 MIN PRN
Status: DISCONTINUED | OUTPATIENT
Start: 2021-09-14 | End: 2021-09-14 | Stop reason: HOSPADM

## 2021-09-14 RX ORDER — ONDANSETRON 2 MG/ML
4 INJECTION INTRAMUSCULAR; INTRAVENOUS
Status: DISCONTINUED | OUTPATIENT
Start: 2021-09-14 | End: 2021-09-14 | Stop reason: HOSPADM

## 2021-09-14 RX ORDER — FENTANYL CITRATE 50 UG/ML
50 INJECTION, SOLUTION INTRAMUSCULAR; INTRAVENOUS EVERY 5 MIN PRN
Status: DISCONTINUED | OUTPATIENT
Start: 2021-09-14 | End: 2021-09-14 | Stop reason: HOSPADM

## 2021-09-14 RX ADMIN — FENTANYL CITRATE 50 MCG: 50 INJECTION, SOLUTION INTRAMUSCULAR; INTRAVENOUS at 07:11

## 2021-09-14 RX ADMIN — PROPOFOL 50 MG: 10 INJECTION, EMULSION INTRAVENOUS at 07:23

## 2021-09-14 RX ADMIN — FENTANYL CITRATE 50 MCG: 50 INJECTION, SOLUTION INTRAMUSCULAR; INTRAVENOUS at 07:18

## 2021-09-14 RX ADMIN — SODIUM CHLORIDE, POTASSIUM CHLORIDE, SODIUM LACTATE AND CALCIUM CHLORIDE: 600; 310; 30; 20 INJECTION, SOLUTION INTRAVENOUS at 06:28

## 2021-09-14 RX ADMIN — Medication 100 MG: at 07:23

## 2021-09-14 RX ADMIN — PROPOFOL 30 MG: 10 INJECTION, EMULSION INTRAVENOUS at 07:30

## 2021-09-14 ASSESSMENT — PULMONARY FUNCTION TESTS
PIF_VALUE: 0
PIF_VALUE: 1
PIF_VALUE: 0
PIF_VALUE: 1
PIF_VALUE: 0
PIF_VALUE: 1

## 2021-09-14 ASSESSMENT — PAIN SCALES - GENERAL: PAINLEVEL_OUTOF10: 0

## 2021-09-14 ASSESSMENT — PAIN - FUNCTIONAL ASSESSMENT
PAIN_FUNCTIONAL_ASSESSMENT: 0-10
PAIN_FUNCTIONAL_ASSESSMENT: 0-10

## 2021-09-14 NOTE — PROGRESS NOTES
Ambulatory Surgery/Procedure Discharge Note    Pt tolerated surgery well. Denies any pain or nausea post procedure. Discharge instructions reviewed with pt and wife. Written instructions provided at discharge. Discharged in wheelchair to lobby level. Wife to drive home. Vitals:    09/14/21 0820   BP: 133/75   Pulse: 53   Resp: 16   Temp: 96.8 °F (36 °C)   SpO2: 96%       In: 570 [P.O.:120; I.V.:450]  Out: -     Restroom use offered before discharge. Yes    Pain assessment:  none  Pain Level: 0        Patient discharged to home/self care.  Patient discharged via wheel chair by transporter to waiting family/S.O.       9/14/2021 9:00 AM

## 2021-09-14 NOTE — PROGRESS NOTES
PACU Transfer to [facility]    Vitals:    09/14/21 0805   BP: (!) 126/109   Pulse: 56   Resp: 15   Temp: 97 °F (36.1 °C)   SpO2: 95%     Pt moving BP arm while measurement taken     Intake/Output Summary (Last 24 hours) at 9/14/2021 0820  Last data filed at 9/14/2021 0805  Gross per 24 hour   Intake 450 ml   Output --   Net 450 ml       Pain assessment:  present - adequately treated  Pain Level: 0    Patient transferred to care of EMERITA RN.    9/14/2021 8:20 AM

## 2021-09-14 NOTE — OP NOTE
OPERATIVE NOTE     Tonia Reasons  1945  [id number]    [facility]    DATE OF PROCEDURE: 09/14/21    PRE-OPERATIVE DIAGNOSIS: R sided portacath in place    POST-OPERATIVE DIAGNOSIS: R sided portacath in place    PROCEDURE: Tunneled right CVC/port removal    SURGEON: Sulema Kim MD    ANESTHESIA: MAC + local    EBL: minimal    INDICATIONS:  Port in place, advised removal by patient's oncologist. Risks of surgery explained to patient and family in preoperative area, including bleeding, infection, need for further procedures, and risks of anesthesia. All questions were answered. PROCEDURE DETAILS:  Patient was brought to the operating theater and placed supine. Sedation was started by anesthesia. Arms were tucked at sides. Right neck and upper chest prepped and draped in sterile fashion using chlorhexidine prep solution. Time out was performed confirming the correct patient, procedure, correct side, and IV antibiotic infusion. SCDs were on and functioning. 6 cc of local anesthetic was used over previous port placement incision. 15 blade scalpel used to incise over previous skin scar, approximately 3 cm. Cautery used to dissect through subcutaneous tissue until the port was reached. Port was grasped and manipulated, freeing it from surrounding tissues. Previous placed prolene sutures were cut as necessary. After catheter was confirmed free from surrounding tissue, it was removed in one piece. It will be sent for pathology gross inspection. Catheter tract closed off with 3-0 vicryl figure of eight. The capsule was cauterized. The cavity was thoroughly irrigated with saline. The wound was closed in 2 layers, with interrupted 3-0 vicryl for deeper tissues and 4-0 Monocryl running subcuticular for the skin. Dermabond was used for sterile dressing. Patient tolerated the procedure well without complication. Patient was woken up and brought to the PACU in stable condition.  All counts were correct at the end of the procedure. No complications.

## 2021-09-14 NOTE — H&P
PRE-OP/PRE-PROCEDURE H&P    Visit Date: 9/14/2021    History:     Ramiro Gross is a 67 y.o. male who presents today for procedure. See my/PCP/oncologist office notes for indications and details. Patient Active Problem List:     Primary localized osteoarthrosis, lower leg     Disorder of bone and cartilage     Arthropathy     Pain in joint, lower leg     Abdominal pain     Hemorrhage of rectum and anus     Essential and other specified forms of tremor     Pain in limb     Edema     Psychosexual dysfunction with inhibited sexual excitement     Pure hypercholesterolemia     Depressive disorder, not elsewhere classified     Obesity     Essential hypertension, benign     Osteoarthritis     Status post total knee replacement     Low back pain     Chest pain     Otalgia     Paroxysmal atrial fibrillation (HCC)     Pain of toe of right foot     URTI (acute upper respiratory infection)     Rectal bleeding     Cancer of ascending colon (HCC)     Colon cancer (HCC)     S/P partial colectomy     Sepsis ([facility] [address]     Colon cancer metastasized to intra-abdominal lymph node (HCC)     Liver mass     Major depression, single episode, in complete remission (HCC)     Body mass index (BMI) 40.0-44.9, adult     History of bilateral knee arthroplasty     Muscle weakness (generalized)     Balance problem     Pain of left lower extremity     Right ear pain         Current Facility-Administered Medications:     ceFAZolin (ANCEF) 2000 mg in dextrose 5 % 50 mL IVPB, 2,000 mg, IntraVENous, Once, Alyce Alcocer MD    lactated ringers infusion, , IntraVENous, Continuous, Heidi Ashley, DO, Last Rate: 100 mL/hr at 09/14/21 0628, New Bag at 09/14/21 5888  Prior to Admission medications    Medication Sig Start Date End Date Taking?  Authorizing Provider   amLODIPine (NORVASC) 5 MG tablet Take 5 mg by mouth daily 4/7/21  Yes Historical Provider, MD   allopurinol (ZYLOPRIM) 100 MG tablet TAKE ONE TABLET BY MOUTH DAILY 3/30/21  Yes Anamaria Jansen MD   apixaban Michael Sniff) 5 MG TABS tablet Take 1 tablet by mouth 2 times daily 2/26/21  Yes Anamaria Jansen MD   atorvastatin (LIPITOR) 40 MG tablet Take 1 tablet by mouth daily 7/20/20  Yes Anamaria Jansen MD   flecainide (TAMBOCOR) 100 MG tablet Take 100 mg by mouth 2 times daily  10/26/10  Yes Historical Provider, MD     Allergies   Allergen Reactions    Statins Anaphylaxis     Past Medical History:   Diagnosis Date    Arthritis     Atrial fibrillation ([facility] [address]     CAD (coronary artery disease)     Colon cancer ([facility] [address]     colon 2019 dec    CPAP (continuous positive airway pressure) dependence     Difficult intubation     many years ago, has been OK recently     Gout     High blood pressure     Hyperlipidemia     Sleep apnea      Past Surgical History:   Procedure Laterality Date    ABDOMEN SURGERY      COLECTOMY      COLONOSCOPY  02/12/2014    sm sessile polyp  hemorrhoids--kassi 2019---dr mora     COLONOSCOPY N/A 10/25/2019    COLONOSCOPY WITH BIOPSY performed by Rommel Hinton MD at [address] COLONOSCOPY N/A 12/18/2020    COLORECTAL CANCER SCREENING, HIGH RISK performed by Rommel Hinton MD at [address] [facility] Right 12/13/2019    LAPAROSCOPIC RIGHT COLECTOMY performed by Mariana Fountain MD at [address] [facility] Bilateral     knees    KNEE ARTHROSCOPY  10.1994    LIVER RESECTION N/A 12/13/2019    DIAGNOSTIC LAPAROSCOPY, LIVER ULTRASOUND performed by Nga Burrougsh MD at [facility]  10/28/2016    dr dalal==[facility]     PORT SURGERY N/A 1/9/2020    SINGLE LUMEN PORT PLACEMENT WITH FLOURO WITH C-ARM performed by Mariana Fountain MD at [address] [facility] Right 8/23/11    TKR on right knee    TOTAL KNEE ARTHROPLASTY Left 2/21/12    Lt TKR---dr Jayleen Barragan          Physical Exam:     /79   Pulse 59   Temp 97.9 °F (36.6 °C) (Temporal)   Resp 18   Ht 5' 9\" (1.753 m)   Wt 280 lb (127 kg)   SpO2 95%   BMI 41.35 kg/m²  Body mass index is 41.35 kg/m². Constitutional: Appears well-developed and well-nourished. Head: Normocephalic, atraumatic. Eyes: No scleral icterus. Vision intact grossly. ENT: Hearing grossly intact. No facial deformity. Neck: Normal range of motion. No tracheal deviation. Cardiovascular: Normal rate. No peripheral edema. Pulmonary/Chest: Effort normal. No respiratory distress. No wheezes. No use of accessory muscles. Musculoskeletal: No gross deformity. Neurological: Alert and oriented to person, place, and time. No gross deficits. Skin: Skin is dry. No rash noted. No pallor. Psychiatric: Normal mood and affect. Behavior normal. Oriented to person, place, and time. Abdomen: soft, NTTP, non distended    Recent labs and imaging reviewed as necessary. Assessment/Plan:       Proceed as planned for port removal    Risks/benefits/alternatives of procedure/surgery discussed with patient and any present family members (or appropriate guardian) and understanding verbalized. All questions answered. Patient wishes to proceed.     Electronically signed by Tawana Daniels MD on 9/14/2021 at 6:50 AM

## 2021-09-14 NOTE — ANESTHESIA POSTPROCEDURE EVALUATION
Department of Anesthesiology  Postprocedure Note    Patient: Elio Medel  MRN: [id number]  YOB: 1945  Date of evaluation: 9/14/2021  Time:  11:43 AM     Procedure Summary     Date: 09/14/21 Room / Location: [address]    Anesthesia Start: 7614 Anesthesia Stop: 5327    Procedure: PORT REMOVAL (N/A Chest) Diagnosis:       Malignant neoplasm of ascending colon ([address]      (Malignant neoplasm of ascending colon ([address] [C18.2])    Surgeons: Lazara Whitney MD Responsible Provider: Coy Stevenson DO    Anesthesia Type: MAC ASA Status: 3          Anesthesia Type: MAC    Maria E Phase I: Maria E Score: 10    Maria E Phase II: Maria E Score: 10    Last vitals: Reviewed and per EMR flowsheets.        Anesthesia Post Evaluation    Patient location during evaluation: PACU  Patient participation: complete - patient participated  Level of consciousness: awake and alert  Pain score: 0  Airway patency: patent  Nausea & Vomiting: no nausea and no vomiting  Cardiovascular status: blood pressure returned to baseline  Respiratory status: acceptable  Hydration status: euvolemic

## 2022-03-16 PROBLEM — M51.369 DDD (DEGENERATIVE DISC DISEASE), LUMBAR: Status: ACTIVE | Noted: 2022-03-16

## 2022-03-16 PROBLEM — M51.36 DDD (DEGENERATIVE DISC DISEASE), LUMBAR: Status: ACTIVE | Noted: 2022-03-16

## 2022-03-16 PROBLEM — M54.9 MID BACK PAIN: Status: ACTIVE | Noted: 2022-03-16

## 2022-03-17 NOTE — PLAN OF CARE
[address] [facility], [facility]  [address] [facility]  Phone: [phone]   Fax:     [phone]                                                       Physical Therapy Certification    Dear Referring Practitioner: Tawana Trujillo MD,    We had the pleasure of evaluating the following patient for physical therapy services at [facility]. A summary of our findings can be found in the initial assessment below. This includes our plan of care. If you have any questions or concerns regarding these findings, please do not hesitate to contact me at the office phone number checked above. Thank you for the referral.       Physician Signature:_______________________________Date:__________________  By signing above (or electronic signature), therapists plan is approved by physician        Patient: Severiano Sides   : 1945   MRN: [id number]     Referring Physician: Referring Practitioner: Tawana Trujillo MD      Evaluation Date: 3/17/2022        Medical Diagnosis Information:  Diagnosis: M53.86 (ICD-10-CM) - Sciatica associated with disorder of lumbar [id number] (ICD-10-CM) - DDD (degenerative disc disease), lumbar   Treatment Diagnosis: Decreased functional mobility 2/2 LBP                                         Insurance information: PT Insurance Information: BCBS Medicare     Precautions/ Contra-indications: none noted  Latex Allergy:  [x]NO      []YES  Preferred Language for Healthcare:   [x]English       []other:    C-SSRS Triggered by Intake questionnaire (Past 2 wk assessment ):   [x] No, Questionnaire did not trigger screening.   [] Yes, Patient intake triggered C-SSRS Screening      [] C-SSRS Screening completed  [] PCP notified via Epic     History of present condition - new onset left LS and Left sciatic pain about three days ago  After carrying luggage at the airport. Was given steroid dose pack and pain pills. MRI 2013     IMPRESSION-    1. 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 2. Diffuse posterior disc bulging noted at L2-L3, L4-L5, at L5-S1.   3. Thecal sac compression at L4-L5 to 8 mm. 4. Diffuse posterior disc herniation greatest posterior laterally   to the right at L3-L4, with thecal sac compression to 6 mm. 5. Correlate with clinical examination.        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 Disc and osteophytes as   well as facet and ligamentum flavum hypertrophy contribute to further   stenosis of the thecal sac and narrowing of the neural foramina as discussed   above.         Pt had DRAGAN in 2016    SUBJECTIVE: Pain is acorss left lumbar region, goes down the leg to the lower leg. Is getting better. Relevant Medical History:Additional Pertinent Hx: arthritis, CAD, colon CA,HTN, HLD, lida TKR, partial liver resection. Functional Scale/Score: Junior =  shebaal 3/18  37    Pain Scale: 5-6/10  Easing factors: rest, sitting  Provocative factors:  Walking weight bearing    Type: [x]Constant   []Intermittent  []Radiating []Localized []other:     Numbness/Tingling: no  Occupation/School:  retired    Living Status/Prior Level of Function: Independent with ADLs and IADLs,     OBJECTIVE:   Palpation: TTP left gluts, lower LS    Functional Mobility/Transfers:   Mod I - difficulty with sit to stand    Posture:  BMI 41.35, decreased lumbar lordosis, no LLD    Gait: antalgic gait, decreased left stance time    Bandages/Dressings/Incisions: NA    Repeated Movements: no change    ROM  Comments   Lumbar Flex Mod ltd    Lumbar Ext sev ltd      ROM LEFT RIGHT Comments   Lumbar Side Bend sev ltd Mod ltd    Lumbar Rotation Mod ltd Mod ltd    Quadrant      Hip Flexion wfl all wfl all    Hip Abd      Hip ER      Hip IR      Hip Extension      Knee Ext      Knee Flex      Hamstring Flex Mod ltd Mod ltd    Piriformis Mod ltd Mod ltd    Ruslan test                Myotomes/Strength Left  Right Comments   [x]ALL NORMAL MYOTOMES      Hip Abd      Hip Ext      Hip flexion (L1-L2 femoral)      Knee extension (L2-L4 femoral)      Knee flexion (S1 sciatic)      Dorsiflexion (L4-L5 deep peroneal)      Great Toe Ext (L5 deep peroneal nerve)      Ankle Eversion (S1-S2 super peroneal)      Ankle PF(S1-S2 tibial)      Multifidus      Transverse Ab        Dermatomes Normal Abnormal Comments   [x]ALL NORMAL            inguinal area (L1)  [] []    anterior mid-thigh (L2) [] []    distal ant thigh/med knee (L3) [] []    medial lower leg and foot (L4) [] []    lateral lower leg and foot (L5) [] []    posterior calf (S1) [] []    medial calcaneus (S2) [] []      Reflexes Normal Abnormal Comments   [x]ALL NORMAL            S1-2 Seated achilles [] []    S1-2 Prone knee bend [] []    L3-4 Patellar tendon [] []      Joint mobility:    []Normal    []Hypo   []Hyper    Neurodynamics:     Orthopedic Special Tests:    Neural dynamic tension testing Normal Abnormal Comments   Slump Test  - Degree of knee flexion:  [x] []    SLR  [] []    0-30 [] [x]    30-70 [] [x]    Femoral nerve (L2-4) [] []       Normal Abnormal N/A Comments   Fwd Bend-aberrant or innominate mvmt) [] [] []    Trendelenburg [] [] []    Kemps/Quadrant [] [] []    Claven Spina [] [] []    OLIVER/Piyush [] [] []    Hip scour [] [] []    Supine to sit [] [] []    Prone knee bend [] [] []           Hip thrust [] [] []    SI distraction/compression [] [] []    Sacral Spring/thrust [] [] []               [x] Patient history, allergies, meds reviewed. Medical chart reviewed. See intake form. Review Of Systems (ROS):  [x]Performed Review of systems (Integumentary, CardioPulmonary, Neurological) by intake and observation. Intake form has been scanned into medical record.  Patient has been instructed to contact their primary care physician regarding ROS issues if not already being addressed at this time. Co-morbidities/Complexities (which will affect course of rehabilitation):   []None     5       Arthritic conditions   []Rheumatoid arthritis (M05.9)  [x]Osteoarthritis (M19.91)   Cardiovascular conditions   [x]Hypertension (I10)  [x]Hyperlipidemia (E78.5)  []Angina pectoris (I20)  []Atherosclerosis (I70)  []CVA Musculoskeletal conditions   [x]Disc pathology   []Congenital spine pathologies   []Prior surgical intervention  []Osteoporosis (M81.8)  []Osteopenia (M85.8)   Endocrine conditions   []Hypothyroid (E03.9)  []Hyperthyroid Gastrointestinal conditions   []Constipation (H59.69)   Metabolic conditions   [x]Morbid obesity (E66.01)  []Diabetes type 1(E10.65) or 2 (E11.65)   []Neuropathy (G60.9)     Pulmonary conditions   []Asthma (J45)  []Coughing   []COPD (J44.9)   Psychological Disorders  []Anxiety (F41.9)  []Depression (F32.9)   []Other:   []Other:           Barriers to/and or personal factors that will affect rehab potential:              []Age  []Sex    []Smoker              []Motivation/Lack of Motivation                        []Co-Morbidities              []Cognitive Function, education/learning barriers              []Environmental, home barriers              []profession/work barriers  []past PT/medical experience  []other:  Justification:     Falls Risk Assessment (30 days):   [x] Falls Risk assessed and no intervention required. [] Falls Risk assessed and Patient requires intervention due to being higher risk   TUG score (>12s at risk):     [] Falls education provided, including:         ASSESSMENT: Pt with sudden onset LLS and left radicular pain after lots of lifting of luggage and walking at the airport. Appears to be a result of overloading a spine with significant degenerative changes.     Functional Impairments:     [x]Noted lumbar/proximal hip hypomobility   []Noted lumbosacral and/or generalized hypermobility   [x]Decreased Lumbosacral/hip/LE functional strength and function. []signs/symptoms consistent with pathology which may benefit from Dry needling     []other:      Prognosis/Rehab Potential:      []Excellent   [x]Good    []Fair   []Poor    Tolerance of evaluation/treatment:    []Excellent   [x]Good    []Fair   []Poor     Physical Therapy Evaluation Complexity Justification  [x] A history of present problem with:  [] no personal factors and/or comorbidities that impact the plan of care;  []1-2 personal factors and/or comorbidities that impact the plan of care  [x]3 personal factors and/or comorbidities that impact the plan of care  [x] An examination of body systems using standardized tests and measures addressing any of the following: body structures and functions (impairments), activity limitations, and/or participation restrictions;:  [] a total of 1-2 or more elements   [x] a total of 3 or more elements   [] a total of 4 or more elements   [x] A clinical presentation with:  [x] stable and/or uncomplicated characteristics   [] evolving clinical presentation with changing characteristics  [] unstable and unpredictable characteristics;   [x] Clinical decision making of [x] low, [] moderate, [] high complexity using standardized patient assessment instrument and/or measurable assessment of functional outcome.     [x] EVAL (LOW) 07513 (typically 20 minutes face-to-face)  [] EVAL (MOD) 82010 (typically 30 minutes face-to-face)  [] EVAL (HIGH) 37699 (typically 45 minutes face-to-face)  [] RE-EVAL     PLAN: Begin PT focusing on: proximal hip mobilizations, LB mobs, LB core activation, proximal hip activation, and HEP    Frequency/Duration:  2 days per week for 8 Weeks:  Interventions:  [x]  Therapeutic exercise including: strength training, ROM, for LE, Glutes and core   [x]  NMR activation and proprioception for glutes , LE and Core   [x]  Manual therapy as indicated for Hip complex, LE and spine to include: Dry Needling/IASTM, STM, PROM, Gr I-IV mobilizations, manipulation. [x]  Modalities as needed that may include: thermal agents, E-stim, Biofeedback, US, iontophoresis as indicated  [x]  Patient education on joint protection, postural re-education, activity modification, progression of HEP. [x]  Aquatic exercise including: strength training, ROM and balance for LE, Glutes and core     HEP instruction: flowsheet    GOALS:  Patient stated goal: less pain  [] Progressing: [] Met: [] Not Met: [] Adjusted  Therapist goals for Patient:   Short Term Goals: To be achieved in: 2 weeks  1. Independent in HEP and progression per patient tolerance, in order to prevent re-injury. [] Progressing: [] Met: [] Not Met: [] Adjusted  2. Patient will have a decrease in pain to facilitate improvement in movement, function, and ADLs as indicated by Functional Deficits. [] Progressing: [] Met: [] Not Met: [] Adjusted    Long Term Goals: To be achieved in: 8 weeks  1. Disability index score of 20% or less for the [place] to assist with reaching prior level of function. [] Progressing: [] Met: [] Not Met: [] Adjusted  2. Patient will demonstrate increased AROM to WNL, good LS mobility, good hip ROM to allow for proper joint functioning as indicated by patients Functional Deficits. [] Progressing: [] Met: [] Not Met: [] Adjusted  3. Patient will demonstrate an increase in Strength to good proximal hip and core activation to allow for proper functional mobility as indicated by patients Functional Deficits. [] Progressing: [] Met: [] Not Met: [] Adjusted  4. Patient will return to 80% functional activities without increased symptoms or restriction. [] Progressing: [] Met: [] Not Met: [] Adjusted  5.  To be able to go back to the [facility]   [] Progressing: [] Met: [] Not Met: [] Adjusted     Electronically signed by:  Marvin Laureano, PT  [id number]        Note: If patient does not return for scheduled/recommended follow up visits, this note will serve as a discharge from care along with the most recent update on progress.

## 2022-03-18 ENCOUNTER — HOSPITAL ENCOUNTER (OUTPATIENT)
Dept: PHYSICAL THERAPY | Age: 77
Setting detail: THERAPIES SERIES
Discharge: HOME | End: 2022-03-18
Payer: MEDICARE

## 2022-03-18 PROCEDURE — 97161 PT EVAL LOW COMPLEX 20 MIN: CPT

## 2022-03-18 PROCEDURE — 97035 APP MDLTY 1+ULTRASOUND EA 15: CPT

## 2022-03-18 PROCEDURE — 97140 MANUAL THERAPY 1/> REGIONS: CPT

## 2022-03-18 NOTE — FLOWSHEET NOTE
[address] [facility], [facility]  [address] [place], [facility]  Phone: [phone]   Fax:     [phone]    Physical Therapy Treatment Note/ Progress Report:     Date:  3/18/2022    Patient Name:  Severiano Sides    :  1945  MRN: [id number]    Pertinent Medical History: Additional Pertinent Hx: arthritis, CAD, colon CA,HTN, HLD, lida TKR, partial liver resection. Medical/Treatment Diagnosis Information:  · Diagnosis: M53.86 (ICD-10-CM) - Sciatica associated with disorder of lumbar [id number] (ICD-10-CM) - DDD (degenerative disc disease), lumbar  · Treatment Diagnosis: Decreased functional mobility 2/2 LBP    Insurance/Certification information:  PT Insurance Information: BCBS Medicare  Physician Information:  Referring Practitioner: Tawana Trujillo MD  Plan of care signed (Y/N): sent 3/18    Date of Patient follow up with Physician: ?     Progress Report: []  Yes  [x]  No     Date Range for reporting period:  Beginning: 3/18/2022  Ending:    Progress report due (10 Rx/or 30 days whichever is less):      Recertification due (POC duration/ or 90 days whichever is less):     Visit # POC/ Insurance Allowable Auth Needed   1 ? [x]Yes    []No        History of present condition - new onset left LS and Left sciatic pain about three days ago  After carrying luggage at the airport. Was given steroid dose pack and pain pills.     MRI       IMPRESSION-    1. 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 2. Diffuse posterior disc bulging noted at L2-L3, L4-L5, at L5-S1.   3. Thecal sac compression at L4-L5 to 8 mm. 4. Diffuse posterior disc herniation greatest posterior laterally   to the right at L3-L4, with thecal sac compression to 6 mm.    5. Correlate with clinical examination.        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 Disc and osteophytes as   well as facet and ligamentum flavum hypertrophy contribute to further   stenosis of the thecal sac and narrowing of the neural foramina as discussed   above.          Pt had DRAGAN in 2016     SUBJECTIVE: Pain is acorss left lumbar region, goes down the leg to the lower leg. Is getting better. Pt very verbal and easily distracted, difficulty staying on task.     Relevant Medical History:Additional Pertinent Hx: arthritis, CAD, colon CA,HTN, HLD, lida TKR, partial liver resection. Functional Scale/Score: [place] =  eval 3/18  37     Pain Scale: 5-6/10  Easing factors: rest, sitting  Provocative factors:  Walking weight bearing     RESTRICTIONS/PRECAUTIONS: none ntoed    Exercises/Interventions:     Therapeutic Ex (71709)   Min: Resistance/Reps Notes/Cues        SKTC add    LTR add         HSS add    GSS add              Therapeutic Activity (35195) Min:                          NMR re-education (42367)   Min:          TA set add    Glut set add         Manual Intervention (53592) Min:  12     STM LLS, gluts In right SL with pillow between knees         Modalities  Min:   8          US 1.5 w/cm2 100% 8 min left LS          ICE in recliner,  Back, buttocks,  Left calf X 15 min                Other Therapeutic Activities:  Pt was educated on PT POC, Diagnosis, Prognosis, pathomechanics as well as frequency and duration of scheduling future physical therapy appointments. Time was also taken on this day to answer all patient questions and participation in PT. Reviewed appointment policy in detail with patient and patient verbalized understanding. Home Exercise Program: Patient instructed in the following for HEP:   . Patient verbalized/demonstrated understanding and was issued written handout.       Therapeutic Exercise and NMR EXR  [x] (57762) Provided verbal/tactile cueing for activities related to strengthening, flexibility, endurance, ROM  for improvements in proximal hip and core control with self care, mobility, lifting and ambulation. [x] (45920) Provided verbal/tactile cueing for activities related to improving balance, coordination, kinesthetic sense, posture, motor skill, proprioception  to assist with core control in self care, mobility, lifting, and ambulation. Therapeutic Activities:    [x] (72978 or 21888) Provided verbal/tactile cueing for activities related to improving balance, coordination, kinesthetic sense, posture, motor skill, proprioception and motor activation to allow for proper function  with self care and ADLs  [x] (32721) Provided training and instruction to the patient for proper core and proximal hip recruitment and positioning with ambulation re-education     Home Exercise Program:    [x] (41346) Reviewed/Progressed HEP activities related to strengthening, flexibility, endurance, ROM of core, proximal hip and LE for functional self-care, mobility, lifting and ambulation   [x] (46540) Reviewed/Progressed HEP activities related to improving balance, coordination, kinesthetic sense, posture, motor skill, proprioception of core, proximal hip and LE for self care, mobility, lifting, and ambulation      Manual Treatments:  PROM / STM / Oscillations-Mobs:  G-I, II, III, IV (PA's, Inf., Post.)  [x] (19905) Provided manual therapy to mobilize proximal hip and LS spine soft tissue/joints for the purpose of modulating pain, promoting relaxation,  increasing ROM, reducing/eliminating soft tissue swelling/inflammation/restriction, improving soft tissue extensibility and allowing for proper ROM for normal function with self care, mobility, lifting and ambulation. ASSESSMENT: Pt with sudden onset LLS and left radicular pain after lots of lifting of luggage and walking at the airport. Appears to be a result of overloading a spine with significant degenerative changes.         GOALS:  Patient stated goal: less pain  []? Progressing: []? Met: []?  Not Met: []? Tolerance:  [x] Patient tolerated treatment well [] Patient limited by fatique  [] Patient limited by pain  [] Patient limited by other medical complications  [] Other:     Overall Progression Towards Functional goals/ Treatment Progress Update:  [] Patient is progressing as expected towards functional goals listed. [] Progression is slowed due to complexities/Impairments listed. [] Progression has been slowed due to co-morbidities. [x] Plan just implemented, too soon to assess goals progression <30days   [] Goals require adjustment due to lack of progress  [] Patient is not progressing as expected and requires additional follow up with physician  [] Other:    Prognosis for POC: [x] Good [] Fair  [] Poor    Patient requires continued skilled intervention: [x] Yes  [] No        PLAN: See eval  [] Continue per plan of care [] Alter current plan (see comments)  [x] Plan of care initiated [] Hold pending MD visit [] Discharge    Electronically signed by: Denisse Pink PT DPT, MS  [id number]    Note: If patient does not return for scheduled/recommended follow up visits, this note will serve as a discharge from care along with the most recent update on progress.

## 2022-03-25 ENCOUNTER — HOSPITAL ENCOUNTER (OUTPATIENT)
Dept: PHYSICAL THERAPY | Age: 77
Setting detail: THERAPIES SERIES
Discharge: HOME | End: 2022-03-25
Payer: MEDICARE

## 2022-03-25 PROCEDURE — 97035 APP MDLTY 1+ULTRASOUND EA 15: CPT

## 2022-03-25 PROCEDURE — 97140 MANUAL THERAPY 1/> REGIONS: CPT

## 2022-03-25 PROCEDURE — 97110 THERAPEUTIC EXERCISES: CPT

## 2022-03-25 PROCEDURE — G0283 ELEC STIM OTHER THAN WOUND: HCPCS

## 2022-03-25 NOTE — FLOWSHEET NOTE
[facility]  [address] [facility]  Phone: [phone]   Fax:     [phone]    Physical Therapy Treatment Note/ Progress Report:     Date:  3/25/2022    Patient Name:  Juliocesar Petty    :  1945  MRN: [id number]    Pertinent Medical History: Additional Pertinent Hx: arthritis, CAD, colon CA,HTN, HLD, lida TKR, partial liver resection. Medical/Treatment Diagnosis Information:  · Diagnosis: M53.86 (ICD-10-CM) - Sciatica associated with disorder of lumbar [id number] (ICD-10-CM) - DDD (degenerative disc disease), lumbar  · Treatment Diagnosis: Decreased functional mobility 2/2 LBP    Insurance/Certification information:  PT Insurance Information: [facility] Medicare  Physician Information:  Referring Practitioner: Mitchell Philip MD  Plan of care signed (Y/N): sent 3/18    Date of Patient follow up with Physician: ?     Progress Report: []  Yes  [x]  No     Date Range for reporting period:  Beginning: 3/25/2022  Ending:    Progress report due (10 Rx/or 30 days whichever is less):      Recertification due (POC duration/ or 90 days whichever is less):     Visit # POC/ Insurance Allowable Auth Needed   2 ? [x]Yes    []No        History of present condition - new onset left LS and Left sciatic pain about three days ago  After carrying luggage at the airport. Was given steroid dose pack and pain pills.     MRI       IMPRESSION-    1. 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 2. Diffuse posterior disc bulging noted at L2-L3, L4-L5, at L5-S1.   3. Thecal sac compression at L4-L5 to 8 mm. 4. Diffuse posterior disc herniation greatest posterior laterally   to the right at L3-L4, with thecal sac compression to 6 mm.    5. Correlate with clinical examination.        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 Disc and osteophytes as   well as facet and ligamentum flavum hypertrophy contribute to further   stenosis of the thecal sac and narrowing of the neural foramina as discussed   above.          Pt had DRAGAN in 2016     Relevant Medical History:Additional Pertinent Hx: arthritis, CAD, colon CA,HTN, HLD, lida TKR, partial liver resection. Functional Scale/Score: Junior =  jasmin 3/18  37     Pain Scale: 5-6/10  Easing factors: rest, sitting  Provocative factors:  Walking weight bearing     RESTRICTIONS/PRECAUTIONS: none noted    SUBJECTIVE: Pain is acorss left lumbar region, goes down the leg to the lower leg. Is getting better. Pt very verbal and easily distracted, difficulty staying on task.   3/25 -  Pt feels about 50% better - done with steroids - notes difficulty with sitting in high chairs, better in low chairs    Exercises/Interventions:     Therapeutic Ex (01009)   Min: Resistance/Reps Notes/Cues        SKTC 10 sec x 5    LTR add         HSS 20 sec x 3    GSS 20 sec x 3         Wall slides ADD    Therapeutic Activity (03257) Min:      Review of body mechanics and lifting ADD Give handout                  NMR re-education (97972)   Min:          TA set 5 sec x 10    Glut set 5 sec x 10         Manual Intervention (59014) Min:  12     STM LLS, gluts In right SL with pillow between knees  To LLS         Modalities  Min:   8          US 1.5 w/cm2 100% 8 min left LS          IFC  in recliner,  Back, buttocks,  Left calf 4 ads LS   X 15 min with two ice packs                Other Therapeutic Activities:  Pt was educated on PT POC, Diagnosis, Prognosis, pathomechanics as well as frequency and duration of scheduling future physical therapy appointments. Time was also taken on this day to answer all patient questions and participation in PT. Reviewed appointment policy in detail with patient and patient verbalized understanding.      Home Exercise Program: Patient instructed in the following for HEP: . Patient verbalized/demonstrated understanding and was issued written handout. Therapeutic Exercise and NMR EXR  [x] (08496) Provided verbal/tactile cueing for activities related to strengthening, flexibility, endurance, ROM  for improvements in proximal hip and core control with self care, mobility, lifting and ambulation. [x] (58941) Provided verbal/tactile cueing for activities related to improving balance, coordination, kinesthetic sense, posture, motor skill, proprioception  to assist with core control in self care, mobility, lifting, and ambulation. Therapeutic Activities:    [x] (40685 or 66964) Provided verbal/tactile cueing for activities related to improving balance, coordination, kinesthetic sense, posture, motor skill, proprioception and motor activation to allow for proper function  with self care and ADLs  [x] (02732) Provided training and instruction to the patient for proper core and proximal hip recruitment and positioning with ambulation re-education     Home Exercise Program:    [x] (12777) Reviewed/Progressed HEP activities related to strengthening, flexibility, endurance, ROM of core, proximal hip and LE for functional self-care, mobility, lifting and ambulation   [x] (41408) Reviewed/Progressed HEP activities related to improving balance, coordination, kinesthetic sense, posture, motor skill, proprioception of core, proximal hip and LE for self care, mobility, lifting, and ambulation      Manual Treatments:  PROM / STM / Oscillations-Mobs:  G-I, II, III, IV (PA's, Inf., Post.)  [x] (00222) Provided manual therapy to mobilize proximal hip and LS spine soft tissue/joints for the purpose of modulating pain, promoting relaxation,  increasing ROM, reducing/eliminating soft tissue swelling/inflammation/restriction, improving soft tissue extensibility and allowing for proper ROM for normal function with self care, mobility, lifting and ambulation.       ASSESSMENT: Pt with sudden onset LLS and left radicular pain after lots of lifting of luggage and walking at the airport. Appears to be a result of overloading a spine with significant degenerative changes.         GOALS:  Patient stated goal: less pain  [x]? Progressing: []? Met: []? Not Met: []? Adjusted  Therapist goals for Patient:   Short Term Goals: To be achieved in: 2 weeks  1. Independent in HEP and progression per patient tolerance, in order to prevent re-injury. [x]? Progressing: []? Met: []? Not Met: []? Adjusted  2. Patient will have a decrease in pain to facilitate improvement in movement, function, and ADLs as indicated by Functional Deficits. [x]? Progressing: []? Met: []? Not Met: []? Adjusted     Long Term Goals: To be achieved in: 8 weeks  1. Disability index score of 20% or less for the [place] to assist with reaching prior level of function. []? Progressing: []? Met: []? Not Met: []? Adjusted  2. Patient will demonstrate increased AROM to WNL, good LS mobility, good hip ROM to allow for proper joint functioning as indicated by patients Functional Deficits. []? Progressing: []? Met: []? Not Met: []? Adjusted  3. Patient will demonstrate an increase in Strength to good proximal hip and core activation to allow for proper functional mobility as indicated by patients Functional Deficits. []? Progressing: []? Met: []? Not Met: []? Adjusted  4. Patient will return to 80% functional activities without increased symptoms or restriction. []? Progressing: []? Met: []? Not Met: []? Adjusted  5. To be able to go back to the [facility]   []? Progressing: []? Met: []? Not Met: []?  Adjusted             Charges:  Timed Code Treatment Minutes: 38   Total Treatment Minutes: 53     [] EVAL (LOW) 48165 (typically 20 minutes face-to-face)  [] EVAL (MOD) 08456 (typically 30 minutes face-to-face)  [] EVAL (HIGH) 31068 (typically 45 minutes face-to-face)  [] RE-EVAL     [x] OZ(72290) x     [] Dry needle 1 or 2 Muscles (38829)  [] NMR (83178) x [] Dry needle 3+ Muscles (92064)  [x] Manual (18528) x     [x] Ultrasound (04423) x  [] TA (36289) x     [] Mech Traction (16342)  [] ES(attended) (57112)     [x] ES (un) (61166):   [] Vasopump (08267) [] Ionto (37185)   [] Other:      Treatment/Activity Tolerance:  [x] Patient tolerated treatment well [] Patient limited by fatique  [] Patient limited by pain  [] Patient limited by other medical complications  [] Other:     Overall Progression Towards Functional goals/ Treatment Progress Update:  [] Patient is progressing as expected towards functional goals listed. [] Progression is slowed due to complexities/Impairments listed. [] Progression has been slowed due to co-morbidities. [x] Plan just implemented, too soon to assess goals progression <30days   [] Goals require adjustment due to lack of progress  [] Patient is not progressing as expected and requires additional follow up with physician  [] Other:    Prognosis for POC: [x] Good [] Fair  [] Poor    Patient requires continued skilled intervention: [x] Yes  [] No        PLAN: See eval  [x] Continue per plan of care [] Alter current plan (see comments)  [x] Plan of care initiated [] Hold pending MD visit [] Discharge    Electronically signed by: Norma Montgomery PT DPT, MS  [id number]    Note: If patient does not return for scheduled/recommended follow up visits, this note will serve as a discharge from care along with the most recent update on progress.

## 2022-03-29 ENCOUNTER — HOSPITAL ENCOUNTER (OUTPATIENT)
Dept: PHYSICAL THERAPY | Age: 77
Setting detail: THERAPIES SERIES
Discharge: HOME | End: 2022-03-29
Payer: MEDICARE

## 2022-03-29 PROCEDURE — 97035 APP MDLTY 1+ULTRASOUND EA 15: CPT

## 2022-03-29 PROCEDURE — 97110 THERAPEUTIC EXERCISES: CPT

## 2022-03-29 PROCEDURE — 97140 MANUAL THERAPY 1/> REGIONS: CPT

## 2022-03-29 PROCEDURE — G0283 ELEC STIM OTHER THAN WOUND: HCPCS

## 2022-03-29 NOTE — FLOWSHEET NOTE
[facility], [facility]  [address] [place], [facility]  Phone: [phone]   Fax:     [phone]    Physical Therapy Treatment Note/ Progress Report:     Date:  3/29/2022    Patient Name:  Yoly Gilbert    :  1945  MRN: [id number]    Pertinent Medical History: Additional Pertinent Hx: arthritis, CAD, colon CA,HTN, HLD, lida TKR, partial liver resection. Medical/Treatment Diagnosis Information:  · Diagnosis: M53.86 (ICD-10-CM) - Sciatica associated with disorder of lumbar [id number] (ICD-10-CM) - DDD (degenerative disc disease), lumbar  · Treatment Diagnosis: Decreased functional mobility 2/2 LBP    Insurance/Certification information:  PT Insurance Information: [facility] Medicare  Physician Information:  Referring Practitioner: Barry Taylor MD  Plan of care signed (Y/N): sent 3/18    Date of Patient follow up with Physician: ?     Progress Report: []  Yes  [x]  No     Date Range for reporting period:  Beginning: 3/29/2022  Ending:    Progress report due (10 Rx/or 30 days whichever is less):      Recertification due (POC duration/ or 90 days whichever is less):     Visit # POC/ Insurance Allowable Auth Needed   3 eval + 5  3/18 -  [x]Yes    []No        History of present condition - new onset left LS and Left sciatic pain about three days ago  After carrying luggage at the airport. Was given steroid dose pack and pain pills.     MRI       IMPRESSION-    1. 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 2. Diffuse posterior disc bulging noted at L2-L3, L4-L5, at L5-S1.   3. Thecal sac compression at L4-L5 to 8 mm. 4. Diffuse posterior disc herniation greatest posterior laterally   to the right at L3-L4, with thecal sac compression to 6 mm.    5. Correlate with clinical examination.        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 Disc and osteophytes as   well as facet and ligamentum flavum hypertrophy contribute to further   stenosis of the thecal sac and narrowing of the neural foramina as discussed   above.          Pt had DRAGAN in 2016     Relevant Medical History:Additional Pertinent Hx: arthritis, CAD, colon CA,HTN, HLD, lida TKR, partial liver resection. Functional Scale/Score: Junior =  jasmin 3/18  37     Pain Scale:3-4/10  Easing factors: rest, sitting  Provocative factors:  Walking weight bearing     RESTRICTIONS/PRECAUTIONS: none noted    SUBJECTIVE: Pain is across left lumbar region, goes down the leg to the lower leg. Is getting better. Pt very verbal and easily distracted, difficulty staying on task.   3/25 -  Pt feels about 50% better - done with steroids - notes difficulty with sitting in high chairs, better in low chairs  3/29 - feels that sitting position is an issue    Exercises/Interventions:     Therapeutic Ex (70388)   Min:  20 Resistance/Reps Notes/Cues        SKTC 10 sec x 5    LTR 10 Cues to go slowly        Seated forward flexion X 5 Cues for technique        HSS 20 sec x 3    GSS 20 sec x 3    Piriformis stretch     add    Therapeutic Activity (78765) Min:      Review of body mechanics and lifting ADD Give handout        NMR re-education (19403)   Min:          TA set 5 sec x 10 review   Glut set 5 sec x 10         Manual Intervention (80527) Min:10     Manual traction   10 min      Self lumbar traction lavender ball, legs 90/90  60 sec on 20 off    10 sec x 10 minimal relief. Relief noted      Difficulty with technique     Modalities  Min:   28          US 1.5 w/cm2 100% 8 min left LS          IFC  in chair  Back, buttocks,  Left calf 4 ads LS   X 15 min with two ice packs                Other Therapeutic Activities:  Pt was educated on PT POC, Diagnosis, Prognosis, pathomechanics as well as frequency and duration of scheduling future physical therapy appointments.  Time was also taken on this day to answer all patient questions and participation in PT. Reviewed appointment policy in detail with patient and patient verbalized understanding. Home Exercise Program: Patient instructed in the following for HEP:   . Patient verbalized/demonstrated understanding and was issued written handout. Therapeutic Exercise and NMR EXR  [x] (11514) Provided verbal/tactile cueing for activities related to strengthening, flexibility, endurance, ROM  for improvements in proximal hip and core control with self care, mobility, lifting and ambulation. [x] (52168) Provided verbal/tactile cueing for activities related to improving balance, coordination, kinesthetic sense, posture, motor skill, proprioception  to assist with core control in self care, mobility, lifting, and ambulation.      Therapeutic Activities:    [x] (19654 or 04383) Provided verbal/tactile cueing for activities related to improving balance, coordination, kinesthetic sense, posture, motor skill, proprioception and motor activation to allow for proper function  with self care and ADLs  [x] (88391) Provided training and instruction to the patient for proper core and proximal hip recruitment and positioning with ambulation re-education     Home Exercise Program:    [x] (70493) Reviewed/Progressed HEP activities related to strengthening, flexibility, endurance, ROM of core, proximal hip and LE for functional self-care, mobility, lifting and ambulation   [x] (57608) Reviewed/Progressed HEP activities related to improving balance, coordination, kinesthetic sense, posture, motor skill, proprioception of core, proximal hip and LE for self care, mobility, lifting, and ambulation      Manual Treatments:  PROM / STM / Oscillations-Mobs:  G-I, II, III, IV (PA's, Inf., Post.)  [x] (70115) Provided manual therapy to mobilize proximal hip and LS spine soft tissue/joints for the purpose of modulating pain, promoting relaxation,  increasing ROM, (typically 20 minutes face-to-face)  [] EVAL (MOD) 27415 (typically 30 minutes face-to-face)  [] EVAL (HIGH) 27360 (typically 45 minutes face-to-face)  [] RE-EVAL     [x] XN(91373) x   1  [] Dry needle 1 or 2 Muscles (38819)  [] NMR (86964) x     [] Dry needle 3+ Muscles (77566)  [x] Manual (71394) x   1  [x] Ultrasound (75299) x  [] TA (50828) x     [] Mech Traction (50337)  [] ES(attended) (63989)     [x] ES (un) (00784):   [] Vasopump (17410) [] Ionto (56469)   [] Other:      Treatment/Activity Tolerance:  [x] Patient tolerated treatment well [] Patient limited by fatique  [] Patient limited by pain  [] Patient limited by other medical complications  [] Other:     Overall Progression Towards Functional goals/ Treatment Progress Update:  [] Patient is progressing as expected towards functional goals listed. [] Progression is slowed due to complexities/Impairments listed. [] Progression has been slowed due to co-morbidities. [x] Plan just implemented, too soon to assess goals progression <30days   [] Goals require adjustment due to lack of progress  [] Patient is not progressing as expected and requires additional follow up with physician  [] Other:    Prognosis for POC: [x] Good [] Fair  [] Poor    Patient requires continued skilled intervention: [x] Yes  [] No        PLAN: See eval  [x] Continue per plan of care [] Alter current plan (see comments)  [x] Plan of care initiated [] Hold pending MD visit [] Discharge    Electronically signed by: David Zamora, PT DPT, MS  [id number]    Note: If patient does not return for scheduled/recommended follow up visits, this note will serve as a discharge from care along with the most recent update on progress.

## 2022-04-01 ENCOUNTER — HOSPITAL ENCOUNTER (OUTPATIENT)
Dept: PHYSICAL THERAPY | Age: 77
Setting detail: THERAPIES SERIES
Discharge: HOME | End: 2022-04-01
Payer: MEDICARE

## 2022-04-01 PROCEDURE — 97110 THERAPEUTIC EXERCISES: CPT

## 2022-04-01 PROCEDURE — 97035 APP MDLTY 1+ULTRASOUND EA 15: CPT

## 2022-04-01 PROCEDURE — 97140 MANUAL THERAPY 1/> REGIONS: CPT

## 2022-04-01 PROCEDURE — G0283 ELEC STIM OTHER THAN WOUND: HCPCS

## 2022-04-01 NOTE — FLOWSHEET NOTE
[facility], [facility]  [address] [place], [facility]  Phone: [phone]   Fax:     [phone]    Physical Therapy Treatment Note/ Progress Report:     Date:  2022    Patient Name:  Johnnie Caraballo    :  1945  MRN: [id number]    Pertinent Medical History: Additional Pertinent Hx: arthritis, CAD, colon CA,HTN, HLD, lida TKR, partial liver resection. Medical/Treatment Diagnosis Information:  · Diagnosis: M53.86 (ICD-10-CM) - Sciatica associated with disorder of lumbar [id number] (ICD-10-CM) - DDD (degenerative disc disease), lumbar  · Treatment Diagnosis: Decreased functional mobility 2/2 LBP    Insurance/Certification information:  PT Insurance Information: [employer]  Physician Information:  Referring Practitioner: Christy Shafer MD  Plan of care signed (Y/N): sent 3/18    Date of Patient follow up with Physician: ?     Progress Report: []  Yes  [x]  No     Date Range for reporting period:  Beginnin2022  Ending:    Progress report due (10 Rx/or 30 days whichever is less):      Recertification due (POC duration/ or 90 days whichever is less):     Visit # POC/ Insurance Allowable Auth Needed   4 eval + 5  3/18 -  [x]Yes    []No        History of present condition - new onset left LS and Left sciatic pain about three days ago  After carrying luggage at the airport. Was given steroid dose pack and pain pills.     MRI       IMPRESSION-    1. 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 2. Diffuse posterior disc bulging noted at L2-L3, L4-L5, at L5-S1.   3. Thecal sac compression at L4-L5 to 8 mm. 4. Diffuse posterior disc herniation greatest posterior laterally   to the right at L3-L4, with thecal sac compression to 6 mm.    5. Correlate with clinical examination.        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 Disc and osteophytes as   well as facet and ligamentum flavum hypertrophy contribute to further   stenosis of the thecal sac and narrowing of the neural foramina as discussed   above.          Pt had DRAGAN in 2016     Relevant Medical History:Additional Pertinent Hx: arthritis, CAD, colon CA,HTN, HLD, lida TKR, partial liver resection. Functional Scale/Score: Junior castellanos 3/18  37     Pain Scale:3-4/10  Easing factors: rest, sitting  Provocative factors:  Walking weight bearing     RESTRICTIONS/PRECAUTIONS: none noted    SUBJECTIVE: Pain is across left lumbar region, goes down the leg to the lower leg. Is getting better. Pt very verbal and easily distracted, difficulty staying on task.   3/25 -  Pt feels about 50% better - done with steroids - notes difficulty with sitting in high chairs, better in low chairs  3/29 - feels that sitting position is an issue  4/1 - LBP 2/10  Leg pain 4/10 - happens only when he is flat on his back. Exercises/Interventions:     Therapeutic Ex (89648)   Min:  20 Resistance/Reps Notes/Cues        SKTC 10 sec x 5    LTR 10 Cues to go slowly        Seated forward flexion X 5 Cues for technique        HSS 20 sec x 3    GSS 20 sec x 3    Piriformis stretch     30 sec x 2    Therapeutic Activity (98100) Min:      Review of body mechanics and lifting ADD Give handout        NMR re-education (93893)   Min:          TA set 5 sec x 10 review   Glut set 5 sec x 10         Manual Intervention (78281) Min:10     Manual traction   10 min      Self lumbar traction lavender ball, legs 90/90  60 sec on 20 off    10 sec x 10 minimal relief.  Relief noted      Difficulty with technique     Modalities  Min:   28          US 1.5 w/cm2 100% 8 min left LS          IFC  in chair  Back, buttocks,  Left calf 4 ads LS   X 15 min with two ice packs                Other Therapeutic Activities:  Pt was educated on PT POC, Diagnosis, Prognosis, pathomechanics as well as frequency and duration of scheduling future physical therapy appointments. Time was also taken on this day to answer all patient questions and participation in PT. Reviewed appointment policy in detail with patient and patient verbalized understanding. Home Exercise Program: Patient instructed in the following for HEP:   . Patient verbalized/demonstrated understanding and was issued written handout. Therapeutic Exercise and NMR EXR  [x] (81655) Provided verbal/tactile cueing for activities related to strengthening, flexibility, endurance, ROM  for improvements in proximal hip and core control with self care, mobility, lifting and ambulation. [x] (75705) Provided verbal/tactile cueing for activities related to improving balance, coordination, kinesthetic sense, posture, motor skill, proprioception  to assist with core control in self care, mobility, lifting, and ambulation.      Therapeutic Activities:    [x] (25731 or 01258) Provided verbal/tactile cueing for activities related to improving balance, coordination, kinesthetic sense, posture, motor skill, proprioception and motor activation to allow for proper function  with self care and ADLs  [x] (75314) Provided training and instruction to the patient for proper core and proximal hip recruitment and positioning with ambulation re-education     Home Exercise Program:    [x] (49870) Reviewed/Progressed HEP activities related to strengthening, flexibility, endurance, ROM of core, proximal hip and LE for functional self-care, mobility, lifting and ambulation   [x] (60543) Reviewed/Progressed HEP activities related to improving balance, coordination, kinesthetic sense, posture, motor skill, proprioception of core, proximal hip and LE for self care, mobility, lifting, and ambulation      Manual Treatments:  PROM / STM / Oscillations-Mobs:  G-I, II, III, IV (PA's, Inf., Post.)  [x] (52713) Provided manual therapy to mobilize proximal hip and LS spine soft tissue/joints for the purpose of modulating pain, promoting relaxation,  increasing ROM, reducing/eliminating soft tissue swelling/inflammation/restriction, improving soft tissue extensibility and allowing for proper ROM for normal function with self care, mobility, lifting and ambulation. ASSESSMENT: Pt with sudden onset LLS and left radicular pain after lots of lifting of luggage and walking at the airport. Appears to be a result of overloading a spine with significant degenerative changes.   4/1 -  Doing better with LBP and less leg pain with proper positioning. GOALS:  Patient stated goal: less pain  [x]? Progressing: []? Met: []? Not Met: []? Adjusted  Therapist goals for Patient:   Short Term Goals: To be achieved in: 2 weeks  1. Independent in HEP and progression per patient tolerance, in order to prevent re-injury. [x]? Progressing: []? Met: []? Not Met: []? Adjusted  2. Patient will have a decrease in pain to facilitate improvement in movement, function, and ADLs as indicated by Functional Deficits. [x]? Progressing: []? Met: []? Not Met: []? Adjusted     Long Term Goals: To be achieved in: 8 weeks  1. Disability index score of 20% or less for the [place] to assist with reaching prior level of function. [x]? Progressing: []? Met: []? Not Met: []? Adjusted  2. Patient will demonstrate increased AROM to WNL, good LS mobility, good hip ROM to allow for proper joint functioning as indicated by patients Functional Deficits. [x]? Progressing: []? Met: []? Not Met: []? Adjusted  3. Patient will demonstrate an increase in Strength to good proximal hip and core activation to allow for proper functional mobility as indicated by patients Functional Deficits. [x]? Progressing: []? Met: []? Not Met: []? Adjusted  4. Patient will return to 80% functional activities without increased symptoms or restriction. [x]? Progressing: []? Met: []? Not Met: []? Adjusted  5. To be able to go back to the [facility]   [x]? Progressing: []? Met: []? Not Met: []? Adjusted             Charges:  Timed Code Treatment Minutes: 38   Total Treatment Minutes: 58     [] EVAL (LOW) 70270 (typically 20 minutes face-to-face)  [] EVAL (MOD) 14940 (typically 30 minutes face-to-face)  [] EVAL (HIGH) 78866 (typically 45 minutes face-to-face)  [] RE-EVAL     [x] TJ(03252) x   1  [] Dry needle 1 or 2 Muscles (48903)  [] NMR (99874) x     [] Dry needle 3+ Muscles (70154)  [x] Manual (70266) x   1  [x] Ultrasound (60429) x  [] TA (19080) x     [] Mech Traction (49921)  [] ES(attended) (95264)     [x] ES (un) (11491):   [] Vasopump (65278) [] Ionto (90993)   [] Other:      Treatment/Activity Tolerance:  [x] Patient tolerated treatment well [] Patient limited by fatique  [] Patient limited by pain  [] Patient limited by other medical complications  [] Other:     Overall Progression Towards Functional goals/ Treatment Progress Update:  [x] Patient is progressing as expected towards functional goals listed. [] Progression is slowed due to complexities/Impairments listed. [] Progression has been slowed due to co-morbidities. [] Plan just implemented, too soon to assess goals progression <30days   [] Goals require adjustment due to lack of progress  [] Patient is not progressing as expected and requires additional follow up with physician  [] Other:    Prognosis for POC: [x] Good [] Fair  [] Poor    Patient requires continued skilled intervention: [x] Yes  [] No        PLAN: See eval  [x] Continue per plan of care [] Alter current plan (see comments)  [x] Plan of care initiated [] Hold pending MD visit [] Discharge    Electronically signed by: Lucrecia Chong PT DPT, MS  [id number]    Note: If patient does not return for scheduled/recommended follow up visits, this note will serve as a discharge from care along with the most recent update on progress.

## 2022-04-05 ENCOUNTER — HOSPITAL ENCOUNTER (OUTPATIENT)
Dept: PHYSICAL THERAPY | Age: 77
Setting detail: THERAPIES SERIES
Discharge: HOME | End: 2022-04-05
Payer: MEDICARE

## 2022-04-05 PROCEDURE — 97140 MANUAL THERAPY 1/> REGIONS: CPT

## 2022-04-05 PROCEDURE — G0283 ELEC STIM OTHER THAN WOUND: HCPCS

## 2022-04-05 PROCEDURE — 97110 THERAPEUTIC EXERCISES: CPT

## 2022-04-05 NOTE — FLOWSHEET NOTE
[facility], [facility]  [address] [place], [facility]  Phone: [phone]   Fax:     [phone]    Physical Therapy Treatment Note/ Progress Report:     Date:  2022    Patient Name:  Mukesh Cardoso    :  1945  MRN: [id number]    Pertinent Medical History: Additional Pertinent Hx: arthritis, CAD, colon CA,HTN, HLD, lida TKR, partial liver resection. Medical/Treatment Diagnosis Information:  · Diagnosis: M53.86 (ICD-10-CM) - Sciatica associated with disorder of lumbar [id number] (ICD-10-CM) - DDD (degenerative disc disease), lumbar  · Treatment Diagnosis: Decreased functional mobility 2/2 LBP    Insurance/Certification information:  PT Insurance Information: [facility] Medicare  Physician Information:  Referring Practitioner: Jameel Montgomery MD  Plan of care signed (Y/N): sent 3/18    Date of Patient follow up with Physician: ?     Progress Report: []  Yes  [x]  No     Date Range for reporting period:  Beginnin2022  Ending:    Progress report due (10 Rx/or 30 days whichever is less):      Recertification due (POC duration/ or 90 days whichever is less):     Visit # POC/ Insurance Allowable Auth Needed    eval + 5  3/18 -  [x]Yes    []No        History of present condition - new onset left LS and Left sciatic pain about three days ago  After carrying luggage at the airport. Was given steroid dose pack and pain pills.     MRI       IMPRESSION-    1. 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 2. Diffuse posterior disc bulging noted at L2-L3, L4-L5, at L5-S1.   3. Thecal sac compression at L4-L5 to 8 mm. 4. Diffuse posterior disc herniation greatest posterior laterally   to the right at L3-L4, with thecal sac compression to 6 mm.    5. Correlate with clinical examination.        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 Disc and osteophytes as   well as facet and ligamentum flavum hypertrophy contribute to further   stenosis of the thecal sac and narrowing of the neural foramina as discussed   above.          Pt had DRAGAN in 2016     Relevant Medical History:Additional Pertinent Hx: arthritis, CAD, colon CA,HTN, HLD, lida TKR, partial liver resection. Functional Scale/Score: Junior =  shebaal 3/18  37     4/5  20     Pain Scale:3-4/10  Easing factors: rest, sitting  Provocative factors:  Walking weight bearing     RESTRICTIONS/PRECAUTIONS: none noted    SUBJECTIVE: Pain is across left lumbar region, goes down the leg to the lower leg. Is getting better. Pt very verbal and easily distracted, difficulty staying on task.   3/25 -  Pt feels about 50% better - done with steroids - notes difficulty with sitting in high chairs, better in low chairs  3/29 - feels that sitting position is an issue  4/1 - LBP 2/10  Leg pain 4/10 - happens only when he is flat on his back. 4/5   LBP  1-2/10  Left lower leg  3/10     Exercises/Interventions:     Therapeutic Ex (91001)   Min:  20 Resistance/Reps Notes/Cues        SKTC 10 sec x 5    LTR 10 Cues to go slowly        Seated forward flexion X 5 Cues for technique        HSS 20 sec x 3    GSS 20 sec x 3    Piriformis stretch     30 sec x 2    Therapeutic Activity (84483) Min:  8     Review of body mechanics and lifting Review for pt auctions and buying larger items to lift  Give handout        NMR re-education (71531)   Min:  5          TA set 5 sec x 10 review   Glut set 5 sec x 10         Manual Intervention (50291) Min:12     Manual traction   10 min      Self lumbar traction lavender ball, legs 90/90  60 sec on 20 off    10 sec x 10 minimal relief.  Relief noted      Difficulty with technique     Modalities  Min:   20          IFC  in chair  Back, buttocks,  Left calf 4 ads LS   X 15 min with two ice packs                Other Therapeutic Activities:  Pt was educated on PT POC, Diagnosis, Prognosis, pathomechanics as well as frequency and duration of scheduling future physical therapy appointments. Time was also taken on this day to answer all patient questions and participation in PT. Reviewed appointment policy in detail with patient and patient verbalized understanding. Home Exercise Program: Patient instructed in the following for HEP:   . Patient verbalized/demonstrated understanding and was issued written handout. Therapeutic Exercise and NMR EXR  [x] (31182) Provided verbal/tactile cueing for activities related to strengthening, flexibility, endurance, ROM  for improvements in proximal hip and core control with self care, mobility, lifting and ambulation. [x] (97882) Provided verbal/tactile cueing for activities related to improving balance, coordination, kinesthetic sense, posture, motor skill, proprioception  to assist with core control in self care, mobility, lifting, and ambulation.      Therapeutic Activities:    [x] (16672 or 04577) Provided verbal/tactile cueing for activities related to improving balance, coordination, kinesthetic sense, posture, motor skill, proprioception and motor activation to allow for proper function  with self care and ADLs  [x] (53848) Provided training and instruction to the patient for proper core and proximal hip recruitment and positioning with ambulation re-education     Home Exercise Program:    [x] (79080) Reviewed/Progressed HEP activities related to strengthening, flexibility, endurance, ROM of core, proximal hip and LE for functional self-care, mobility, lifting and ambulation   [x] (57879) Reviewed/Progressed HEP activities related to improving balance, coordination, kinesthetic sense, posture, motor skill, proprioception of core, proximal hip and LE for self care, mobility, lifting, and ambulation      Manual Treatments:  PROM / STM / Oscillations-Mobs:  G-I, II, III, IV (PA's, Inf., Post.)  [x] (94232) Provided manual therapy to mobilize proximal hip and LS spine soft tissue/joints for the purpose of modulating pain, promoting relaxation,  increasing ROM, reducing/eliminating soft tissue swelling/inflammation/restriction, improving soft tissue extensibility and allowing for proper ROM for normal function with self care, mobility, lifting and ambulation. ASSESSMENT: Pt with sudden onset LLS and left radicular pain after lots of lifting of luggage and walking at the airport. Appears to be a result of overloading a spine with significant degenerative changes.   4/1 -  Doing better with LBP and less leg pain with proper positioning. GOALS:  Patient stated goal: less pain  [x]? Progressing: []? Met: []? Not Met: []? Adjusted  Therapist goals for Patient:   Short Term Goals: To be achieved in: 2 weeks  1. Independent in HEP and progression per patient tolerance, in order to prevent re-injury. [x]? Progressing: []? Met: []? Not Met: []? Adjusted  2. Patient will have a decrease in pain to facilitate improvement in movement, function, and ADLs as indicated by Functional Deficits. [x]? Progressing: []? Met: []? Not Met: []? Adjusted     Long Term Goals: To be achieved in: 8 weeks  1. Disability index score of 20% or less for the [place] to assist with reaching prior level of function. [x]? Progressing: []? Met: []? Not Met: []? Adjusted  2. Patient will demonstrate increased AROM to WNL, good LS mobility, good hip ROM to allow for proper joint functioning as indicated by patients Functional Deficits. [x]? Progressing: []? Met: []? Not Met: []? Adjusted  3. Patient will demonstrate an increase in Strength to good proximal hip and core activation to allow for proper functional mobility as indicated by patients Functional Deficits. [x]? Progressing: []? Met: []? Not Met: []? Adjusted  4. Patient will return to 80% functional activities without increased symptoms or restriction. [x]? Progressing: []? Met: []?  Not Met: []? Adjusted  5. To be able to go back to the [facility]   [x]? Progressing: []? Met: []? Not Met: []? Adjusted             Charges:  Timed Code Treatment Minutes: 38   Total Treatment Minutes: 58     [] EVAL (LOW) 97743 (typically 20 minutes face-to-face)  [] EVAL (MOD) 23663 (typically 30 minutes face-to-face)  [] EVAL (HIGH) 00483 (typically 45 minutes face-to-face)  [] RE-EVAL     [x] LJ(72268) x   2  [] Dry needle 1 or 2 Muscles (44415)  [] NMR (66713) x     [] Dry needle 3+ Muscles (47791)  [x] Manual (27952) x   1  [] Ultrasound (83913) x  [] TA (44439) x     [] Mech Traction (28997)  [] ES(attended) (48391)     [x] ES (un) (94732):   [] Vasopump (82938) [] Ionto (38212)   [] Other:      Treatment/Activity Tolerance:  [x] Patient tolerated treatment well [] Patient limited by fatique  [] Patient limited by pain  [] Patient limited by other medical complications  [] Other:     Overall Progression Towards Functional goals/ Treatment Progress Update:  [x] Patient is progressing as expected towards functional goals listed. [] Progression is slowed due to complexities/Impairments listed. [] Progression has been slowed due to co-morbidities. [] Plan just implemented, too soon to assess goals progression <30days   [] Goals require adjustment due to lack of progress  [] Patient is not progressing as expected and requires additional follow up with physician  [] Other:    Prognosis for POC: [x] Good [] Fair  [] Poor    Patient requires continued skilled intervention: [x] Yes  [] No        PLAN: See eval  [x] Continue per plan of care [] Alter current plan (see comments)  [x] Plan of care initiated [] Hold pending MD visit [] Discharge    Electronically signed by: Bony Landry PT DPT, MS  [id number]    Note: If patient does not return for scheduled/recommended follow up visits, this note will serve as a discharge from care along with the most recent update on progress.

## 2022-04-08 ENCOUNTER — HOSPITAL ENCOUNTER (OUTPATIENT)
Dept: PHYSICAL THERAPY | Age: 77
Setting detail: THERAPIES SERIES
Discharge: HOME | End: 2022-04-08
Payer: MEDICARE

## 2022-04-08 PROCEDURE — G0283 ELEC STIM OTHER THAN WOUND: HCPCS

## 2022-04-08 PROCEDURE — 97110 THERAPEUTIC EXERCISES: CPT

## 2022-04-12 ENCOUNTER — HOSPITAL ENCOUNTER (OUTPATIENT)
Dept: CT IMAGING | Age: 77
Discharge: HOME | End: 2022-04-12
Payer: MEDICARE

## 2022-04-12 DIAGNOSIS — C18.2 CANCER OF ASCENDING COLON (HCC): ICD-10-CM

## 2022-04-12 LAB
GFR AFRICAN AMERICAN: >60
GFR NON-AFRICAN AMERICAN: >60
PERFORMED ON: NORMAL
POC CREATININE: 0.8 MG/DL (ref 0.8–1.3)
POC SAMPLE TYPE: NORMAL

## 2022-04-12 PROCEDURE — 82565 ASSAY OF CREATININE: CPT

## 2022-04-12 PROCEDURE — 6360000004 HC RX CONTRAST MEDICATION: Performed by: NURSE PRACTITIONER

## 2022-04-12 PROCEDURE — 74177 CT ABD & PELVIS W/CONTRAST: CPT

## 2022-04-12 RX ADMIN — IOHEXOL 50 ML: 240 INJECTION, SOLUTION INTRATHECAL; INTRAVASCULAR; INTRAVENOUS; ORAL at 09:02

## 2022-04-12 RX ADMIN — IOPAMIDOL 75 ML: 755 INJECTION, SOLUTION INTRAVENOUS at 09:02

## 2022-06-22 ENCOUNTER — OFFICE VISIT (OUTPATIENT)
Dept: SLEEP MEDICINE | Age: 77
End: 2022-06-22
Payer: MEDICARE

## 2022-06-22 VITALS
SYSTOLIC BLOOD PRESSURE: 130 MMHG | HEIGHT: 69 IN | BODY MASS INDEX: 43.43 KG/M2 | HEART RATE: 63 BPM | OXYGEN SATURATION: 99 % | DIASTOLIC BLOOD PRESSURE: 80 MMHG | RESPIRATION RATE: 18 BRPM | WEIGHT: 293.2 LBS | TEMPERATURE: 97.5 F

## 2022-06-22 DIAGNOSIS — Z99.89 DEPENDENCE ON OTHER ENABLING MACHINES AND DEVICES: ICD-10-CM

## 2022-06-22 DIAGNOSIS — G47.33 OSA TREATED WITH BIPAP: Primary | ICD-10-CM

## 2022-06-22 PROCEDURE — 99214 OFFICE O/P EST MOD 30 MIN: CPT | Performed by: PSYCHIATRY & NEUROLOGY

## 2022-06-22 PROCEDURE — 1123F ACP DISCUSS/DSCN MKR DOCD: CPT | Performed by: PSYCHIATRY & NEUROLOGY

## 2022-06-22 ASSESSMENT — ENCOUNTER SYMPTOMS: APNEA: 0

## 2022-06-22 ASSESSMENT — SLEEP AND FATIGUE QUESTIONNAIRES
HOW LIKELY ARE YOU TO NOD OFF OR FALL ASLEEP WHILE LYING DOWN TO REST IN THE AFTERNOON WHEN CIRCUMSTANCES PERMIT: 2
HOW LIKELY ARE YOU TO NOD OFF OR FALL ASLEEP WHILE SITTING AND TALKING TO SOMEONE: 0
HOW LIKELY ARE YOU TO NOD OFF OR FALL ASLEEP WHILE SITTING QUIETLY AFTER LUNCH WITHOUT ALCOHOL: 0
HOW LIKELY ARE YOU TO NOD OFF OR FALL ASLEEP WHILE WATCHING TV: 2
ESS TOTAL SCORE: 4
HOW LIKELY ARE YOU TO NOD OFF OR FALL ASLEEP WHILE SITTING INACTIVE IN A PUBLIC PLACE: 0
HOW LIKELY ARE YOU TO NOD OFF OR FALL ASLEEP IN A CAR, WHILE STOPPED FOR A FEW MINUTES IN TRAFFIC: 0
HOW LIKELY ARE YOU TO NOD OFF OR FALL ASLEEP WHEN YOU ARE A PASSENGER IN A CAR FOR AN HOUR WITHOUT A BREAK: 0
HOW LIKELY ARE YOU TO NOD OFF OR FALL ASLEEP WHILE SITTING AND READING: 0

## 2022-06-22 NOTE — PROGRESS NOTES
Rose Chang         : 1945        PHONE: [phone] (home)   [] [facility]     [x] [address]      [] [facility]     [][facility]    [] [facility]  [] [facility]     [] Other:    Diagnosis: [x] RENETTA (G47.33) [] CSA (G47.31) [] Apnea (G47.30)   Length of Need: [] 12 Months [x] 99 Months [] Other:    Machine (DENYS!): [] Respironics Dream Station      Auto [] ResMed AirSense     Auto [] Other:     []  CPAP () [] Bilevel ()   Mode: [] Auto [] Spontaneous    Mode: [] Auto [] Spontaneous                            Comfort Settings:   - Ramp Pressure: 5 cmH2O                                        - Ramp time: 15 min                                     -  Flex/EPR - 3 full time                                    - For ResMed Bilevel (TiMax-4 sec   TiMin- 0.2 sec)     Humidifier: [] Heated ()        [x] Water chamber replacement ()/ 1 per 6 months        Mask:   [x] Nasal () /1 per 3 months [] Full Face () /1 per 3 months   [x] Patient choice -Size and fit mask [] Patient Choice - Size and fit mask   [] Dispense:  [] Dispense:    [x] Headgear () / 1 per 3 months [] Headgear () / 1 per 3 months   [x] Replacement Nasal Cushion ()/2 per month [] Interface Replacement ()/1 per month   [x] Replacement Nasal Pillows ()/2 per month         Tubing: [x] Heated ()/1 per 3 months    [] Standard ()/1 per 3 months [] Other:           Filters: [x] Non-disposable ()/1 per 6 months     [x] Ultra-Fine, Disposable ()/2 per month        Miscellaneous: [] Chin Strap ()/ 1 per 6 months [] O2 bleed-in:       LPM   [] Oximetry on CPAP/Bilevel []  Other:          Start Order Date: 22    MEDICAL JUSTIFICATION:  I, the undersigned, certify that the above prescribed supplies are medically necessary for this patients wellbeing.   In my opinion, the supplies are both reasonable and necessary in reference to accepted standards of medicalpractice in treatment of this patients condition.     Barry Hale MD      NPI: [id number]       Order Signed Date: 06/22/22    Electronically signed by Barry Hale MD on 6/22/2022 at 9:34 AM

## 2022-06-22 NOTE — PATIENT INSTRUCTIONS
Patient Education        Learning About CPAP for Sleep Apnea  What is CPAP? CPAP is a small machine that you use at home every night while you sleep. It increases air pressure in your throat to keep your airway open. When you have sleep apnea, this can help you sleep better, feel better, and avoid futurehealth problems. CPAP stands for \"continuous positive airway pressure. \"  The CPAP machine will have one of the following:   A mask that covers your nose and mouth   A mask that covers your nose only   A nasal pillow that covers only the openings of your nose  Why is it done? CPAP is usually the best treatment for obstructive sleep apnea. It is the firsttreatment choice and the most widely used. CPAP:   Helps you have more normal sleep, so you feel less sleepy and more alert during the daytime.  May help keep heart failure or other heart problems from getting worse.  May help lower your blood pressure. If you have a bed partner, they may also sleep better when you use a CPAP. That's because you aren't snoring or restless. Your doctor may suggest CPAP if you have:   Moderate to severe sleep apnea.  Sleep apnea and coronary artery disease (CAD).  Sleep apnea and heart failure. What are the side effects? Some people who use CPAP have:   A dry or stuffy nose and a sore throat.  Irritated skin on the face.  Bloating. How can you care for yourself? If using CPAP is not comfortable, or if you have certain side effects, workwith your doctor to fix them. Here are some things you can try:   Be sure the mask, nasal mask, or nasal pillow fits well.  See if your doctor can adjust the pressure of your CPAP.  If your nose or mouth is dry, set the machine to deliver warmer or wetter air. Or try using a humidifier.  If your nose is runny or stuffy, talk to your doctor about using a decongestant medicine or steroid nasal spray. Be safe with medicines. Read and follow all instructions on the label. Do not use the medicine longer than the label says.  Your doctor may also help you with problems like swallowing air, bloating, or claustrophobia. Talk to your doctor if you're still having problems. If these things don'thelp, you might try a different type of machine. Where can you learn more? Go to [email or web address] and sign in to your [email or web address] account. Enter P021 in the Fitbay box to learn more about \"Learning About CPAP for Sleep Apnea. \"     If you do not have an account, please click on the \"Sign Up Now\" link. Current as of: March 9, 2022               Content Version: 13.3  © 2006-2022 Healthwise, Incorporated. Care instructions adapted under license by [facility] ([facility]). If you have questions about a medical condition or this instruction, always ask your healthcare professional. [address] any warranty or liability for your use of this information.

## 2022-06-22 NOTE — PROGRESS NOTES
MD ROSIE Youngblood Board Certified in Sleep Medicine  Certified in [address] [place] [address] Certified in Neurology Silvio Heather Kitchen [address] [place] [address],  Karsten Poole 67  [phone]   [address] [place] [address], [address] [place] [address]                      [address] [facility]  [address] [id number] [phone]    Subjective:     Patient ID: Trisha Saleh is a 67 y.o. male. Chief Complaint   Patient presents with    Follow-up    Sleep Apnea       HPI:        Trisha Saleh is a 67 y.o. male was seen today as annual follow for moderate obstructive sleep apnea. Patient is using the VPAP machine about 99% of the time, more than 4 hours a nightabout  99 %, in total average of 7:25 hours a night in last 90 days. Currently on PAP at 15/10 cm (), the AHI is only 0.9 events per hour at this pressure. Patient improved regarding daytime sleepiness and fatigue, wakes up refreshed in the morning. The Patient scored Total score: 4 on [place] Sleepiness Scale ( more than 10 is indicative of daytime sleepiness)   Patient has no problem with PAP pressure or mask, uses nasal mask. Has gained 8 pounds since last visit.    DOT/CDL - N/A        Previous Report(s)Reviewed: historical medical records         Social History     Socioeconomic History    Marital status:      Spouse name: Not on file    Number of children: 3    Years of education: Not on file    Highest education level: Not on file   Occupational History    Occupation: retired   Tobacco Use    Smoking status: Former Smoker     Packs/day: 0.25     Years: 12.00     Pack years: 3.00     Types: Cigarettes     Start date: 1960     Quit date: 1971     Years since quittin.8    Smokeless tobacco: Never Used   Vaping Use    Vaping Use: Never used Substance and Sexual Activity    Alcohol use: Yes     Comment: occ     Drug use: No    Sexual activity: Not Currently   Other Topics Concern    Not on file   Social History Narrative    Not on file     Social Determinants of Health     Financial Resource Strain: Unknown    Difficulty of Paying Living Expenses: Patient refused   Food Insecurity: Unknown    Worried About Running Out of Food in the Last Year: Patient refused    [address] in the Last Year: Patient refused   Transportation Needs:     Lack of Transportation (Medical): Not on file    Lack of Transportation (Non-Medical): Not on file   Physical Activity:     Days of Exercise per Week: Not on file    Minutes of Exercise per Session: Not on file   Stress:     Feeling of Stress : Not on file   Social Connections:     Frequency of Communication with Friends and Family: Not on file    Frequency of Social Gatherings with Friends and Family: Not on file    Attends [employer] Services: Not on file    Active Member of [address] [place] [address] or Organizations: Not on file    Attends Club or Organization Meetings: Not on file    Marital Status: Not on file   Intimate Partner Violence:     Fear of Current or Ex-Partner: Not on file    Emotionally Abused: Not on file    Physically Abused: Not on file    Sexually Abused: Not on file   Housing Stability:     Unable to Pay for Housing in the Last Year: Not on file    Number of [place] in the Last Year: Not on file    Unstable Housing in the Last Year: Not on file       Prior to Admission medications    Medication Sig Start Date End Date Taking?  Authorizing Provider   allopurinol (ZYLOPRIM) 100 MG tablet TAKE ONE TABLET BY MOUTH DAILY 3/30/22  Yes Elijah Zabala MD   atorvastatin (LIPITOR) 40 MG tablet TAKE ONE TABLET BY MOUTH DAILY 10/4/21  Yes Elijah Zabala MD   amLODIPine (NORVASC) 5 MG tablet Take 5 mg by mouth daily 4/7/21  Yes Historical Provider, MD   apixaban (ELIQUIS) 5 MG TABS tablet Take 1 tablet by mouth 2 times daily 2/26/21  Yes Joan Rea MD   flecainide (TAMBOCOR) 100 MG tablet Take 100 mg by mouth 2 times daily  10/26/10  Yes Historical Provider, MD       Allergies as of 06/22/2022 - Fully Reviewed 06/22/2022   Allergen Reaction Noted    Statins Anaphylaxis 12/15/2020       Patient Active Problem List   Diagnosis    Primary localized osteoarthrosis, lower leg    Disorder of bone and cartilage    Arthropathy    Pain in joint, lower leg    Abdominal pain    Hemorrhage of rectum and anus    Essential and other specified forms of tremor    Pain in limb    Edema    Psychosexual dysfunction with inhibited sexual excitement    Pure hypercholesterolemia    Depressive disorder, not elsewhere classified    Obesity    Essential hypertension, benign    Osteoarthritis    Status post total knee replacement    Low back pain    Chest pain    Otalgia    Paroxysmal atrial fibrillation (HCC)    Pain of toe of right foot    URTI (acute upper respiratory infection)    Rectal bleeding    Cancer of ascending colon (HCC)    Colon cancer ([address]    S/P partial colectomy    Sepsis ([address]    Colon cancer metastasized to intra-abdominal lymph node ([address]    Liver mass    Major depression, single episode, in complete remission ([address]    Body mass index (BMI) of 40.0 to 44.9 in adult [facility])    History of bilateral knee arthroplasty    Muscle weakness (generalized)    Balance problem    Pain of left lower extremity    Right ear pain    Port-A-Cath in place    Mid back pain    DDD (degenerative disc disease), lumbar       Past Medical History:   Diagnosis Date    Arthritis     Atrial fibrillation ([address]     CAD (coronary artery disease)     Colon cancer ([address]     colon 2019 dec    CPAP (continuous positive airway pressure) dependence     Difficult intubation     many years ago, has been OK recently     Gout     High blood pressure     Hyperlipidemia     Sleep apnea        Past Surgical History:   Procedure Laterality Date    ABDOMEN SURGERY      COLECTOMY      COLONOSCOPY  02/12/2014    sm sessile polyp  hemorrhoids--kassi 2019---dr mora     COLONOSCOPY N/A 10/25/2019    COLONOSCOPY WITH BIOPSY performed by Wenceslao Cardoso MD at [address] COLONOSCOPY N/A 12/18/2020    COLORECTAL CANCER SCREENING, HIGH RISK performed by Wenceslao Cardoso MD at [address] Right 12/13/2019    LAPAROSCOPIC RIGHT COLECTOMY performed by Anali Ruano MD at [facility] Bilateral     knees    KNEE ARTHROSCOPY  10.1994    LIVER RESECTION N/A 12/13/2019    DIAGNOSTIC LAPAROSCOPY, LIVER ULTRASOUND performed by Rishi Mcfarland MD at [facility] [address]  10/28/2016    dr dalal==[facility]     PORT SURGERY N/A 1/9/2020    SINGLE LUMEN PORT PLACEMENT WITH FLOURO WITH C-ARM performed by Anali Ruano MD at [address] [place] [address] Rd N/A 9/14/2021    PORT REMOVAL performed by Anali Ruano MD at [facility] Right 8/23/11    TKR on right knee    TOTAL KNEE ARTHROPLASTY Left 2/21/12    Lt TKR---dr Mayela Henning        Family History   Problem Relation Age of Onset    Diabetes Mother     High Blood Pressure Mother     Cancer Mother     Diabetes Father     High Blood Pressure Father     Cancer Father     Heart Disease Other     High Blood Pressure Other        Review of Systems   Constitutional: Negative for diaphoresis. Respiratory: Negative for apnea. Genitourinary: Negative for frequency. Neurological: Negative for headaches. Objective:     Vitals:  Weight BMI Neck circumference    Wt Readings from Last 3 Encounters:   06/22/22 293 lb 3.2 oz (133 kg)   05/13/22 293 lb (132.9 kg)   03/16/22 280 lb (127 kg)    Body mass index is 43.3 kg/m².        BP HR SaO2   BP Readings from Last 3 Encounters:   06/22/22 130/80   05/13/22 130/82   03/16/22 138/88    Pulse Readings from Last 3 Encounters:   06/22/22 63   05/13/22 56   03/16/22 65    SpO2 Readings from Last 3 Encounters:   06/22/22 99%   05/13/22 97%   03/16/22 97%        Themandibular molar Class :   [x]1 []2 []3      Mallampati I Airway Classification:   []1 []2 [x]3 []4      Physical Exam  Vitals and nursing note reviewed. Cardiovascular:      Rate and Rhythm: Normal rate and regular rhythm. Pulmonary:      Effort: Pulmonary effort is normal.      Breath sounds: Normal breath sounds. Musculoskeletal:      Right lower leg: No edema. Left lower leg: No edema.         :   Moderate Obstructive Sleep Apnea/Hypopnea Syndrome under good control on PAP at 15/10 cmwp. Diagnosis Orders   1. RENETTA treated with BiPAP     2. Dependence on other enabling machines and devices     3. Body mass index (BMI) of 40.0 to 44.9 in adult [facility])       Plan: Will continue the PAP at 15/10 cmwp. I will order PAP supplies, mask, filters. ... Weight loss: The proportionality between weight and AHI. With 10% weight change, the AHI has a 27% proportionate change. With 20% weight change, the AHI has a 45-50% proportionate change. No orders of the defined types were placed in this encounter. Return for Reveiwing CPAP usage and compliance report and tro.     Varinder Ba MD  Medical Director [address] [place] [address]

## 2022-07-06 PROBLEM — L98.9 SKIN LESION: Status: ACTIVE | Noted: 2022-07-06

## 2022-07-18 ENCOUNTER — TELEPHONE (OUTPATIENT)
Dept: ORTHOPEDIC SURGERY | Age: 77
End: 2022-07-18

## 2022-07-18 ENCOUNTER — OFFICE VISIT (OUTPATIENT)
Dept: ORTHOPEDIC SURGERY | Age: 77
End: 2022-07-18
Payer: MEDICARE

## 2022-07-18 VITALS — HEIGHT: 69 IN | BODY MASS INDEX: 43.69 KG/M2 | WEIGHT: 295 LBS

## 2022-07-18 DIAGNOSIS — M16.11 PRIMARY OSTEOARTHRITIS OF RIGHT HIP: ICD-10-CM

## 2022-07-18 DIAGNOSIS — M25.551 RIGHT HIP PAIN: Primary | ICD-10-CM

## 2022-07-18 PROCEDURE — 99213 OFFICE O/P EST LOW 20 MIN: CPT | Performed by: PHYSICIAN ASSISTANT

## 2022-07-18 PROCEDURE — 1123F ACP DISCUSS/DSCN MKR DOCD: CPT | Performed by: PHYSICIAN ASSISTANT

## 2022-07-18 NOTE — PROGRESS NOTES
This dictation was done with Circleon dictation and may contain mechanical errors related to translation. I have today reviewed with Akilah Brown the clinically relevant, past medical history, medications, allergies, family history, social history, and Review Of Systems form the patients most recent history form & I have documented any details relevant to today's presenting complaints in my history below. Mr. Richie Caal's self-reported past medical history, medications, allergies, family history, social history, and Review Of Systems form has been scanned into the chart under the \"Media\" tab. Subjective:  Akilah Brown is a 67 y.o. who is here as an established patient I did the total knee is with  4 hours on her in the past.  He is complaining of pain in his back and the right side lateral and posterior hip. She is walking aggravates it he still going to the gym 3 times a week working on the optical.  But he says he has 5 out of 10 pain and is having hard time and feels stiff in the right hip joint. He was sent for x-rays including AP pelvis and 2 view right hip.   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      Patient Active Problem List   Diagnosis    Primary localized osteoarthrosis, lower leg    Disorder of bone and cartilage    Arthropathy    Pain in joint, lower leg    Abdominal pain    Hemorrhage of rectum and anus    Essential and other specified forms of tremor    Pain in limb    Edema    Psychosexual dysfunction with inhibited sexual excitement    Pure hypercholesterolemia    Depressive disorder, not elsewhere classified    Obesity    Essential hypertension, benign    Osteoarthritis    Status post total knee replacement    Low back pain    Chest pain    Otalgia    Paroxysmal atrial fibrillation (HCC)    Pain of toe of right foot    URTI (acute upper respiratory infection)    Rectal bleeding    Cancer of ascending colon (HCC)    Colon cancer ([facility]    S/P partial colectomy    Sepsis ([facility]    Colon cancer metastasized to intra-abdominal lymph node (HCC)    Liver mass    Major depression, single episode, in complete remission (HCC)    Body mass index (BMI) of 40.0 to 44.9 in adult [facility])    History of bilateral knee arthroplasty    Muscle weakness (generalized)    Balance problem    Pain of left lower extremity    Right ear pain    Port-A-Cath in place    Mid back pain    DDD (degenerative disc disease), lumbar    Skin lesion           Current Outpatient Medications on File Prior to Visit   Medication Sig Dispense Refill    allopurinol (ZYLOPRIM) 100 MG tablet TAKE ONE TABLET BY MOUTH DAILY 90 tablet 0    atorvastatin (LIPITOR) 40 MG tablet TAKE ONE TABLET BY MOUTH DAILY 90 tablet 4    amLODIPine (NORVASC) 5 MG tablet Take 5 mg by mouth daily      apixaban (ELIQUIS) 5 MG TABS tablet Take 1 tablet by mouth 2 times daily 180 tablet 1    flecainide (TAMBOCOR) 100 MG tablet Take 100 mg by mouth 2 times daily        No current facility-administered medications on file prior to visit. Objective:   Height 5' 9\" (1.753 m), weight 295 lb (133.8 kg). On examination is a very pleasant 65-year-old gentleman in no acute distress she is alert and oriented x3. He has mildly positive straight leg raise on the right with difficulty with internal and external rotation more on the right hip than the left. He has fair hip flexion and abduction strength. He is got good distal pulses good dorsiflexion plantarflexion strength. Both knees have well-healed incisions and approximately 0 to 130 degrees. Neuro exam grossly intact both lower extremities. Intact sensation to light touch. Motor exam 4+ to 5/5 in all major motor groups. Negative Miranda's sign. Skin is warm, dry and intact with out erythema or significant increased temperature around the knee joint(s). There are no cutaneous lesions or lymphadenopathy present.     X-RAYS:  The x-rays taken the office today show some mild joint space narrowing and subchondral sclerosis in the hip joint. No obvious fractures were seen overall leg length looks to be appropriate      Assessment:  Mild right hip osteoarthritis and lumbar radiculopathy    Plan:  During today's visit, there was approximately 30 minutes of face-to-face discussion in regards to the patient's current condition and treatment options. More than 50 % of the time was counseling and coordination of care as indicated above. We talked about short and long-term expectations and for therapeutic and diagnostic reasons we will schedule him a fluoroscopic guided cortisone injection by Dr. Manolo Wright sometime in the near future.   We will have him continue with his physical therapy type exercises and follow-up after the injection      PROCEDURE NOTE:  Schedule right hip joint injection      They will schedule a follow up in 1 to 2 weeks

## 2022-07-21 ENCOUNTER — HOSPITAL ENCOUNTER (OUTPATIENT)
Dept: GENERAL RADIOLOGY | Age: 77
Discharge: HOME | End: 2022-07-21
Payer: MEDICARE

## 2022-07-21 ENCOUNTER — OFFICE VISIT (OUTPATIENT)
Dept: ORTHOPEDIC SURGERY | Age: 77
End: 2022-07-21
Payer: MEDICARE

## 2022-07-21 DIAGNOSIS — M16.11 PRIMARY OSTEOARTHRITIS OF RIGHT HIP: ICD-10-CM

## 2022-07-21 DIAGNOSIS — M16.11 PRIMARY OSTEOARTHRITIS OF RIGHT HIP: Primary | ICD-10-CM

## 2022-07-21 PROCEDURE — 20610 DRAIN/INJ JOINT/BURSA W/O US: CPT

## 2022-07-21 PROCEDURE — 99999 PR OFFICE/OUTPT VISIT,PROCEDURE ONLY: CPT | Performed by: ORTHOPAEDIC SURGERY

## 2022-07-21 PROCEDURE — 6360000002 HC RX W HCPCS

## 2022-07-21 PROCEDURE — 2500000003 HC RX 250 WO HCPCS

## 2022-07-21 PROCEDURE — 77002 NEEDLE LOCALIZATION BY XRAY: CPT | Performed by: ORTHOPAEDIC SURGERY

## 2022-07-21 PROCEDURE — 20610 DRAIN/INJ JOINT/BURSA W/O US: CPT | Performed by: ORTHOPAEDIC SURGERY

## 2022-07-21 PROCEDURE — 77002 NEEDLE LOCALIZATION BY XRAY: CPT

## 2022-08-01 NOTE — PROGRESS NOTES
Patient is calling about his surgery date of 8/16/22.     Patient would like the nurse to return call to reschedule his surgery date.   Patient presented to [facility] drive up clinic for preop testing. Patient was swabbed and given information advising them to remain isolated until procedure date.

## 2022-09-19 ENCOUNTER — HOSPITAL ENCOUNTER (OUTPATIENT)
Dept: GENERAL RADIOLOGY | Age: 77
Discharge: HOME | End: 2022-09-19
Payer: MEDICARE

## 2022-09-19 ENCOUNTER — HOSPITAL ENCOUNTER (OUTPATIENT)
Age: 77
Discharge: HOME | End: 2022-09-19
Payer: MEDICARE

## 2022-09-19 DIAGNOSIS — M54.50 LOW BACK PAIN, UNSPECIFIED BACK PAIN LATERALITY, UNSPECIFIED CHRONICITY, UNSPECIFIED WHETHER SCIATICA PRESENT: ICD-10-CM

## 2022-09-19 PROCEDURE — 72110 X-RAY EXAM L-2 SPINE 4/>VWS: CPT

## 2022-10-28 ENCOUNTER — HOSPITAL ENCOUNTER (OUTPATIENT)
Dept: PHYSICAL THERAPY | Age: 77
Setting detail: THERAPIES SERIES
Discharge: HOME | End: 2022-10-28
Payer: MEDICARE

## 2022-10-28 PROCEDURE — 97161 PT EVAL LOW COMPLEX 20 MIN: CPT

## 2022-10-28 PROCEDURE — 97530 THERAPEUTIC ACTIVITIES: CPT

## 2022-10-28 PROCEDURE — 97110 THERAPEUTIC EXERCISES: CPT

## 2022-10-28 NOTE — PLAN OF CARE
[facility], [facility]  [address] [facility], [facility]  Phone: [phone]   Fax:     [phone]                                                       Physical Therapy Certification    Dear Rey Telles *,    We had the pleasure of evaluating the following patient for physical therapy services at [facility]. A summary of our findings can be found in the initial assessment below. This includes our plan of care. If you have any questions or concerns regarding these findings, please do not hesitate to contact me at the office phone number checked above. Thank you for the referral.       Physician Signature:_______________________________Date:__________________  By signing above (or electronic signature), therapists plan is approved by physician        Patient: Danay Garcia   : 1945   MRN: [id number]  Referring Physician: ESTEBAN Blancas *      Evaluation Date: 10/28/2022      Medical Diagnosis Information:  Medical Diagnosis: Pain in right knee [M25.561]  Meralgia paresthetica, right lower limb [G57.11]  Radiculopathy, lumbar region [M54.16]   Treatment Diagnosis: Decreased functional mobility 2/2 right knee pain/LBP                                         Insurance information: PT Insurance Information: BCBS Medicare    Precautions/ Contra-indications: none noted  Latex Allergy:  [x]NO      []YES  Preferred Language for Healthcare:   [x]English       []other:    C-SSRS Triggered by Intake questionnaire (Past 2 wk assessment ):   [x] No, Questionnaire did not trigger screening.   [] Yes, Patient intake triggered C-SSRS Screening      [] C-SSRS Screening completed  [] PCP notified via Epic   History of Present condition -  recent history of LBP and right leg gain. Had DRAGAN and nerve root block 4 days ago and feeling about 75% better. Right knee has always been acting up and pt wanted to come to PT to see if he could get to feeling better. SUBJECTIVE: Back feels about 75% better  right knee hurts - Hurts if legs are hanging down, better if legs are on the ground. Now can walk without the cane    Relevant Medical History:Additional Pertinent Hx: Arthritis     CAD (coronary artery disease)     Colon cancer ([facility] [address]  colon 2019 dec   High blood pressure     Hyperlipidemia      Atrial fibrillation (HCC)     CPAP (continuous positive airway pressure) dependence     Right and left TKR Gout     Sleep apnea  Functional Scale/Score: FOTO = 36     Filled out for his back - also a recent issue  Pain Scale: 3-4/10  Easing factors:  rest/ice  Provocative factors:  walking steps    Type: [x]Constant   []Intermittent  []Radiating []Localized []other:     Numbness/Tingling:  NA    Occupation/School:  retired    Living Status/Prior Level of Function: Independent with ADLs and IADLs,  two steps from garage into home, avoids steps, does not go upstairs or downstairs    OBJECTIVE:   Palpation:  no specific TTP    Functional Mobility/Transfers:  effortful, Mod I, uses arms of chair to get up.      Posture:  BMI 42.83    Bandages/Dressings/Incisions:  na    Gait: (include devices/WB status)  antalgic gait, currently using SPC    ROM LEFT RIGHT   HIP Flex Wfll all Wfl all   HIP Abd     HIP Ext     HIP IR     HIP ER     Knee ext 4/5 4/5   Knee Flex 4+/5 4+/5   Ankle PF Wfl all Wfl all   Ankle DF     Ankle In     Ankle Ev     Strength  LEFT RIGHT   HIP Flexors Wfl all Wfl all   HIP Abductors     HIP Ext     Hip ER     Knee EXT (quad) -13 -15   Knee Flex (HS) 114 110   Ankle DF 5/5 all 5/5 all   Ankle PF     Ankle Inv     Ankle EV          Circumference  Mid apex  7 cm prox     NA   NA       Reflexes/Sensation:    [x]Dermatomes/Myotomes intact    [x]Reflexes equal and normal bilaterally   []Other:    Joint mobility: nl patellar mobility right   []Normal    []Hypo   []Hyper    Orthopedic Special Tests: NT                       [x] Patient history, allergies, meds reviewed. Medical chart reviewed. See intake form. Review Of Systems (ROS):  [x]Performed Review of systems (Integumentary, CardioPulmonary, Neurological) by intake and observation. Intake form has been scanned into medical record. Patient has been instructed to contact their primary care physician regarding ROS issues if not already being addressed at this time. Co-morbidities/Complexities   []None      5       Arthritic conditions   []Rheumatoid arthritis (M05.9)  [x]Osteoarthritis (M19.91)   Cardiovascular conditions   [x]Hypertension (I10)  [x]Hyperlipidemia (E78.5)  []Angina pectoris (I20)  []Atherosclerosis (I70)  []CVA  Afib  X Musculoskeletal conditions   []Disc pathology   []Congenital spine pathologies   []Prior surgical intervention  []Osteoporosis (M81.8)  []Osteopenia (M85.8)   Endocrine conditions   []Hypothyroid (E03.9)  []Hyperthyroid Gastrointestinal conditions   []Constipation (F34.02)   Metabolic conditions   [x]Morbid obesity (E66.01)  []Diabetes type 1(E10.65) or 2 (E11.65)   []Neuropathy (G60.9)     Pulmonary conditions   []Asthma (J45)  []Coughing   []COPD (J44.9)   Psychological Disorders  []Anxiety (F41.9)  []Depression (F32.9)   []Other:   []Other:          Barriers to/and or personal factors that will affect rehab potential:              []Age  []Sex    []Smoker              []Motivation/Lack of Motivation                        []Co-Morbidities              []Cognitive Function, education/learning barriers              []Environmental, home barriers              []profession/work barriers  []past PT/medical experience  []other:  Justification:     Falls Risk Assessment (30 days):   [x] Falls Risk assessed and no intervention required.   [] Falls Risk assessed and Patient requires intervention due to being higher risk   TUG score (>12s at risk):     [] Falls education provided, including         ASSESSMENT: Pt with long history of knee pain, prior TKR lida. With recent exacerbation of symptoms as well as lumbar and radicular pain. Back is much better post shots, right knee an issue that pt wants better before he leaves for vacation next week. Functional Impairments:     []Noted lumbar/proximal hip/LE hypomobility   [x]Decreased LE functional ROM   []Decreased core/proximal hip strength and neuromuscular control   [x]Decreased LE functional strength   []Reduced balance/proprioceptive control   []other:      Functional Activity Limitations (from functional questionnaire and intake)   [x]Reduced ability to tolerate prolonged functional positions   [x]Reduced ability or difficulty with changes of positions or transfers between positions   []Reduced ability to maintain good posture and demonstrate good body mechanics with sitting, bending, and lifting   []Reduced ability to sleep   [] Reduced ability or tolerance with driving and/or computer work   [x]Reduced ability to perform lifting, carrying tasks   [x]Reduced ability to squat   []Reduced ability to forward bend   [x]Reduced ability to ambulate prolonged functional periods/distances/surfaces   [x]Reduced ability to ascend/descend stairs   []Reduced ability to run, hop or jump   []other:     Participation Restrictions   []Reduced participation in self care activities   [x]Reduced participation in home management activities   []Reduced participation in work activities   [x]Reduced participation in social activities. []Reduced participation in sport activities. Classification :    []Signs/symptoms consistent with post-surgical status including decreased ROM, strength and function.    []Signs/symptoms consistent with joint sprain/strain   []Signs/symptoms consistent with patella-femoral syndrome   [x]Signs/symptoms consistent with knee OA/hip OA   []Signs/symptoms consistent with internal derangement of knee/Hip   []Signs/symptoms consistent with functional hip weakness/NMR control      []Signs/symptoms consistent with tendinitis/tendinosis    []signs/symptoms consistent with pathology which may benefit from Dry needling      []other:      Prognosis/Rehab Potential:      []Excellent   [x]Good    []Fair   []Poor    Tolerance of evaluation/treatment:    []Excellent   [x]Good    []Fair   []Poor    Physical Therapy Evaluation Complexity Justification  [x] A history of present problem with:  [] no personal factors and/or comorbidities that impact the plan of care;  [x]1-2 personal factors and/or comorbidities that impact the plan of care  []3 personal factors and/or comorbidities that impact the plan of care  [x] An examination of body systems using standardized tests and measures addressing any of the following: body structures and functions (impairments), activity limitations, and/or participation restrictions;:  [x] a total of 1-2 or more elements   [] a total of 3 or more elements   [] a total of 4 or more elements   [x] A clinical presentation with:  [x] stable and/or uncomplicated characteristics   [] evolving clinical presentation with changing characteristics  [] unstable and unpredictable characteristics;   [x] Clinical decision making of [x] low, [] moderate, [] high complexity using standardized patient assessment instrument and/or measurable assessment of functional outcome.     [x] EVAL (LOW) 36680 (typically 20 minutes face-to-face)  [] EVAL (MOD) 86333 (typically 30 minutes face-to-face)  [] EVAL (HIGH) 33990 (typically 45 minutes face-to-face)  [] RE-EVAL     PLAN:  Frequency/Duration:  2 days per week for 8 Weeks:  Interventions:  [x]  Therapeutic exercise including: strength training, ROM, for Lower extremity and core   [x]  NMR activation and proprioception for LE, Glutes and Core   [x]  Manual therapy as indicated for LE, Hip and spine to include: Dry Needling/IASTM, STM, PROM, Gr I-IV mobilizations, manipulation. [x] Modalities as needed that may include: thermal agents, E-stim, Biofeedback, US, iontophoresis as indicated  [x] Patient education on joint protection, postural re-education, activity modification, progression of HEP. [x] Aquatic exercise including: strength training, ROM, and balance for Lower extremity and core     HEP instruction: flowsheet    GOALS:  Patient stated goal: for his knee to not hurt  [] Progressing: [] Met: [] Not Met: [] Adjusted    Therapist goals for Patient:   Short Term Goals: To be achieved in: 2 weeks  1. Independent in HEP and progression per patient tolerance, in order to prevent re-injury. [] Progressing: [] Met: [] Not Met: [] Adjusted  2. Patient will have a decrease in pain to facilitate improvement in movement, function, and ADLs as indicated by Functional Deficits. [] Progressing: [] Met: [] Not Met: [] Adjusted    Long Term Goals: To be achieved in: 8 weeks  1. Increase FOTO functional outcome score from 36 to 50 to assist with reaching prior level of function. [] Progressing: [] Met: [] Not Met: [] Adjusted  2. Patient will demonstrate increased AROM to 5-115 to allow for proper joint functioning as indicated by patients Functional Deficits. [] Progressing: [] Met: [] Not Met: [] Adjusted  3. Patient will demonstrate an increase in Strength to good proximal hip strength and control, within 5lb HHD in LE to allow for proper functional mobility as indicated by patients Functional Deficits. [] Progressing: [] Met: [] Not Met: [] Adjusted  4. Patient will return to 50%  functional activities without increased symptoms or restriction. [] Progressing: [] Met: [] Not Met: [] Adjusted  5.  To be able to walk more and not have my knee hurt. (patient specific functional goal)    [] Progressing: [] Met: [] Not Met: [] Adjusted     Electronically signed by:  Jese Pham PT DPT, MS  [id number]      Note: If patient does not return for scheduled/recommended follow up visits, this note will serve as a discharge from care along with the most recent update on progress.

## 2022-10-28 NOTE — FLOWSHEET NOTE
[facility], [facility]  [address] [facility]  Phone: [phone]   Fax:     [phone] [address] [place] [address], [facility]  [address] [facility]  Phone: [phone]   Fax:     [phone]      Physical Therapy Daily/Aquatic Flow Sheet   Date:  10/28/2022    Patient Name:  Tari Ontiveros    :  1945  MRN: [id number]    Restrictions/Precautions:    Pertinent Medical History:Additional Pertinent Hx: Arthritis     CAD (coronary artery disease)     [facility]  colon 2019 dec   High blood pressure     Hyperlipidemia      Atrial fibrillation (HCC)     CPAP (continuous positive airway pressure) dependence     Right and left TKR Gout     Sleep apnea    Medical/Treatment Diagnosis Information:   Medical Diagnosis: Pain in right knee [M25.561]  Meralgia paresthetica, right lower limb [G57.11]  Radiculopathy, lumbar region [M54.16]  Treatment Diagnosis: Decreased functional mobility 2/2 right knee pain/LBP    Insurance/Certification information:  PT Insurance Information: [facility] Medicare  Physician Information:  ESTEBAN Pena *  Plan of care signed (Y/N): sent 10/28    Date of Patient follow up with Physician:      Progress Report: []  Yes  [x]  No     Date Range for reporting period:  Beginning:  10/28/2022  Ending:      Progress report due (10 Rx/or 30 days whichever is less): 68/51;68     Recertification due (POC duration/ or 90 days whichever is less): 22     Visit # POC/Insurance Allowable Auth Needed   1 /  ??? AIM []Yes   []No     Latex Allergy:  [x]NO      []YES  Preferred Language for Healthcare:   [x]English       []Other:     Relevant Medical History:Additional Pertinent Hx: Arthritis     CAD (coronary artery disease)     Colon cancer ([facility] [address]  colon  dec   High blood pressure     Hyperlipidemia Atrial fibrillation (HCC)     CPAP (continuous positive airway pressure) dependence     Right and left TKR Gout     Sleep apnea  Functional Scale/Score: FOTO = 36     Filled out for his back - also a recent issue    History of Present condition -  recent history of LBP and right leg gain. Had DRAGAN and nerve root block 4 days ago and feeling about 75% better. Right knee has always been acting up and pt wanted to come to PT to see if he could get to feeling better. SUBJECTIVE: Back feels about 75% better  right knee hurts - Hurts if legs are hanging down, better if legs are on the ground.   Now can walk without the cane                                                      10/28 left            right  Knee ext 4/5 4/5   Knee Flex 4+/5 4+/5     Knee EXT (quad) -13 -15   Knee Flex (HS) 114 110       Land-based Visits Exercises/Activities:  Therapeutic Ex (60742)  Min: Resistance/Repetitions Notes   Hamstring stretch add              Therapeutic Activity (19799) Min:     Pool tour, policies and procedures Pt verbalizes understanding    Use of cane for safety and stabiltiy          NMR re-education (82223) Min:  10     Add sets 5 sec x 10    Glut sets supine and seated Supine 5 sec x 10    Quad sets Supine 5 sec x 10         Manual Intervention (57006)  Min:                    Modalities  Min:  Recommended alternating ice/heat for pain control             Aquatic Visits Exercises/Activities:  Aquatic Abbreviation Key  B= Belt DB= Dumbells T= Theratube   G=Gloves N= Noodles W= Weights   P= Paddles WW=Water Walker K= Kickboard        Transfers:         % Immersion:             Ambulation:   Exercises UE:      Forwards  Shoulder Shrugs     Lateral  Shoulder circles     Marching    Scapular Retraction      Retro   Rolling      [place]ariocas  Push Downs    Multifidus walkouts w/paddle  IR/ER      Punching    Stretching:  Rowing    Gastroc/Soleus   Elbow Flex/Ext    Hamstring   Shldr Flex/Ext     Knee flex stretch   Shldr Abd/Add    Piriformis   Horiz Abd/Add     Hip flexor    Arm Circles     SKTC   PNF Diagonals    DKTC  UE oscillations f/b, s/s    ITB   Wall Push-ups    Quad  Figure 8's    Mid back   Buoy pull/push downs    UT  Tband rows    Rhom  Tband lats    Post Shoulder  Lumbar Rot w/ paddles    Pec   Small ball pull downs                   diagonals             Cervical:       AROM Flex       AROM Extension     LEs exercises:   AROM Side Bending    Marching   AROM Rotation    Heel Raises   Chin tucks    Toe Raises   Chin nods     Squats        Hamstring Curls        Hip Flexion   Balance: Hip Abduction  SLS    Hip Circles  Tandem stance    TA set  NBOS eyes open    Glut Set  NBOS eyes closed    Hip Extension  Hand to Opposite Knee    Hip Adduction    Box Step     Hip IR   Noodle Stance     Hip ER  Stop/Go Gait    Fig 8's  Switch Gait                Seated:  Functional:    Ankle Pumps  Step up forward    Ankle circles  Step up lateral    Knee flex & ext  Step down    Hip Abd & Adduction  [place] squats    Bicycle   Crate Lifts    Add Set with ball  Lunges forward    LX stab with med ball throws  Lunges lateral    Ankle INV  Lunges retro    Ankle EV  Lower ab curl with noodle      Upper ab curl with ball      Med ball straight lifts      Med ball diagonal lifts      Hydrorider          Noodle:      Leg Press  Deep Water:    Noodle hang at wall  Jog    Noodle hang deep water  Jumping Jacks    Noodle Bicycle  Heel to toe      Hand to opposite knee      Cross country skier      Rocking Horse      Other Therapeutic Activities:  Pt was educated on PT POC, Diagnosis, Prognosis, pathomechanics as well as frequency and duration of scheduling future physical therapy appointments. Time was also taken on this day to answer all patient questions and participation in PT. Reviewed appointment policy in detail with patient and patient verbalized understanding.      Home Exercise Program:  Patient was instructed in the following for HEP:     . Patient verbalized/demonstrated understanding and was issued written handout. Charges: Therapeutic Exercise and NMR EXR  [x] (13452) Provided verbal/tactile cueing for activities related to strengthening, flexibility, endurance, ROM for improvements in LE, proximal hip, and core control with self care, mobility, lifting, ambulation. [x] (63700) Provided verbal/tactile cueing for activities related to improving balance, coordination, kinesthetic sense, posture, motor skill, proprioception  to assist with LE, proximal hip, and core control in self care, mobility, lifting, ambulation and eccentric single leg control. NMR and Therapeutic Activities:    [x] (41850 or 94131) Provided verbal/tactile cueing for activities related to improving balance, coordination, kinesthetic sense, posture, motor skill, proprioception and motor activation to allow for proper function of core, proximal hip and LE with self care and ADLs and functional mobility.    [x] (75155) Gait Re-education- Provided training and instruction to the patient for proper LE, core and proximal hip recruitment and positioning and eccentric body weight control with ambulation re-education including up and down stairs     Home Exercise Program:    [x] (15489) Reviewed/Progressed HEP activities related to strengthening, flexibility, endurance, ROM of core, proximal hip and LE for functional self-care, mobility, lifting and ambulation/stair navigation   [] (23693)Reviewed/Progressed HEP activities related to improving balance, coordination, kinesthetic sense, posture, motor skill, proprioception of core, proximal hip and LE for self care, mobility, lifting, and ambulation/stair navigation      Manual Treatments:  PROM / STM / Oscillations-Mobs:  G-I, II, III, IV (PA's, Inf., Post.)  [x] (80807) Provided manual therapy to mobilize LE, proximal hip and/or LS spine soft tissue/joints for the purpose of modulating pain, promoting relaxation, increasing ROM, reducing/eliminating soft tissue swelling/inflammation/restriction, improving soft tissue extensibility and allowing for proper ROM for normal function with self care, mobility, lifting and ambulation. Individual Aquatic Therapy:  [x] (24995) Provided verbal and tactile cueing for strengthening, flexibility, ROM using the therapeutic properties of water (buoyancy, resistance) for improvements in core control, mobility and ambulation     Aquatic Group:  [x] (47031) Provided intermittent verbal and tactile cueing for strengthening, endurance, flexibility and ROM for 2-4 individuals that do not require one-on one patient contact by the therapist       Land Timed Code Treatment Minutes: 25   Land Total Treatment Minutes: [id number] [address] Minutes:    Aquatic Group Minutes:      [x] EVAL (LOW) 25235 (typically 20 minutes face-to-face)  [] EVAL (MOD) 69942 (typically 30 minutes face-to-face)  [] EVAL (HIGH) 49417 (typically 45 minutes face-to-face)  [] RE-EVAL     [x] SK(84357) x     [] Dry needle 1 or 2 Muscles (19007)  [] NMR (80058) x     [] Dry needle 3+ Muscles (48363)  [] Manual (47826) x     [] Ultrasound (95811) x  [x] TA (54970) x     [] Mech Traction (46713)  [] ES(attended) (51374)     [] ES (un) (23931):   [] Vasopump (76676) [] Ionto (92342)   [] Aquatic Ind (90654) x    [] Aquatic Group (77250) x   [] Other:       ASSESSMENT: Pt with long history of knee pain, prior TKR lida. With recent exacerbation of symptoms as well as lumbar and radicular pain. Back is much better post shots, right knee an issue that pt wants better before he leaves for vacation next week. GOALS:  Patient stated goal: for his knee to not hurt  [] Progressing: [] Met: [] Not Met: [] Adjusted     Therapist goals for Patient:   Short Term Goals: To be achieved in: 2 weeks  1. Independent in HEP and progression per patient tolerance, in order to prevent re-injury.    [] Progressing: [] Met: [] Not Met: [] Adjusted  2. Patient will have a decrease in pain to facilitate improvement in movement, function, and ADLs as indicated by Functional Deficits. [] Progressing: [] Met: [] Not Met: [] Adjusted     Long Term Goals: To be achieved in: 8 weeks  1. Increase FOTO functional outcome score from 36 to 50 to assist with reaching prior level of function. [] Progressing: [] Met: [] Not Met: [] Adjusted  2. Patient will demonstrate increased AROM to 5-115 to allow for proper joint functioning as indicated by patients Functional Deficits. [] Progressing: [] Met: [] Not Met: [] Adjusted  3. Patient will demonstrate an increase in Strength to good proximal hip strength and control, within 5lb HHD in LE to allow for proper functional mobility as indicated by patients Functional Deficits. [] Progressing: [] Met: [] Not Met: [] Adjusted  4. Patient will return to 50%  functional activities without increased symptoms or restriction. [] Progressing: [] Met: [] Not Met: [] Adjusted  5. To be able to walk more and not have my knee hurt. (patient specific functional goal)    [] Progressing: [] Met: [] Not Met: [] Adjusted           Treatment/Activity Tolerance:  [x] Patient tolerated treatment well [] Patient limited by fatique  [] Patient limited by pain  [] Patient limited by other medical complications  [] Other:     Overall Progression Towards Functional goals/ Treatment Progress Update:  [] Patient is progressing as expected towards functional goals listed. [] Progression is slowed due to complexities/Impairments listed. [] Progression has been slowed due to co-morbidities.   [x] Plan just implemented, too soon to assess goals progression <30days   [] Goals require adjustment due to lack of progress  [] Patient is not progressing as expected and requires additional follow up with physician  [] Other    Prognosis for POC: [x] Good [] Fair  [] Poor    Patient requires continued skilled intervention: [x] Yes  [] No PLAN:   [] Continue per plan of care [] Alter current plan (see comments)  [x] Plan of care initiated [] Hold pending MD visit [] Discharge    Electronically signed by: Daniel Morales, PT [id number]    Note: If patient does not return for scheduled/recommended follow up visits, this note will serve as a discharge from care along with the most recent update on progress.

## 2022-11-01 ENCOUNTER — APPOINTMENT (OUTPATIENT)
Dept: PHYSICAL THERAPY | Age: 77
End: 2022-11-01
Payer: MEDICARE

## 2022-11-01 ENCOUNTER — HOSPITAL ENCOUNTER (OUTPATIENT)
Dept: PHYSICAL THERAPY | Age: 77
Setting detail: THERAPIES SERIES
End: 2022-11-01
Payer: MEDICARE

## 2022-11-08 ENCOUNTER — APPOINTMENT (OUTPATIENT)
Dept: PHYSICAL THERAPY | Age: 77
End: 2022-11-08
Payer: MEDICARE

## 2022-11-08 ENCOUNTER — HOSPITAL ENCOUNTER (OUTPATIENT)
Dept: PHYSICAL THERAPY | Age: 77
Setting detail: THERAPIES SERIES
Discharge: HOME | End: 2022-11-08
Payer: MEDICARE

## 2022-11-08 PROCEDURE — 97113 AQUATIC THERAPY/EXERCISES: CPT

## 2022-11-08 NOTE — FLOWSHEET NOTE
[facility] - Outpatient Rehabilitation and Therapy, [facility]  [address] [facility]  Phone: [phone]   Fax:     [phone] [address] and [address] [place] [address], [facility]  [address] [facility]  Phone: [phone]   Fax:     [phone]      Physical Therapy Daily/Aquatic Flow Sheet   Date:  2022    Patient Name:  Karen Matute    :  1945  MRN: [id number]    Restrictions/Precautions:    Pertinent Medical History:     Medical/Treatment Diagnosis Information:   Medical Diagnosis: Pain in right knee [M25.561]  Meralgia paresthetica, right lower limb [G57.11]  Radiculopathy, lumbar region [M54.16]       Insurance/Certification information:   [place] [id number]  Physician Information:  ESTEBAN Bradley *  Plan of care signed (Y/N): sent 10/28    Date of Patient follow up with Physician:      Progress Report: []  Yes  [x]  No     Date Range for reporting period:  Beginnin2022  Ending:      Progress report due (10 Rx/or 30 days whichever is less): 82/15;45     Recertification due (POC duration/ or 90 days whichever is less): 22     Visit # POC/Insurance Allowable Auth Needed     AIM  9 visits  10-28-22 to 22 []Yes   []No     Latex Allergy:  [x]NO      []YES  Preferred Language for Healthcare:   [x]English       []Other:     Relevant Medical History:Additional Pertinent Hx: Arthritis     CAD (coronary artery disease)     Colon cancer ([address]  colon 2019 dec   High blood pressure     Hyperlipidemia      Atrial fibrillation (HCC)     CPAP (continuous positive airway pressure) dependence     Right and left TKR Gout     Sleep apnea   LB Sx  Functional Scale/Score: FOTO = 36     Filled out for his back - also a recent issue    History of Present condition -  recent history of LBP and right leg gain.   Had DRAGAN and nerve root block 4 days ago and feeling about 75% better. Right knee has always been acting up and pt wanted to come to PT to see if he could get to feeling better. Pain:   R TKR  4/10                  after Rx 3/10               LBP     2/10                                2/10        SUBJECTIVE: Back feels about 75% better  right knee hurts - Hurts if legs are hanging down, better if legs are on the ground. Now can walk without the cane  11-8-22 reports DRAGAN helpful, but wearing off. Is member at [facility]. Reports no R LE pain, just R knee.                                                                  10/28 left            right  Knee ext 4/5 4/5   Knee Flex 4+/5 4+/5     Knee EXT (quad) -13 -15   Knee Flex (HS) 114 110       Land-based Visits Exercises/Activities:  Therapeutic Ex (47067)  Min: Resistance/Repetitions Notes   Hamstring stretch add              Therapeutic Activity (92334) Min:     Pool tour, policies and procedures Pt verbalizes understanding    Use of cane for safety and stabiltiy          NMR re-education (05542) Min:  10     Add sets 5 sec x 10    Glut sets supine and seated Supine 5 sec x 10    Quad sets Supine 5 sec x 10         Manual Intervention (85539)  Min:                    Modalities  Min:  Recommended alternating ice/heat for pain control             Aquatic Visits Exercises/Activities:  Aquatic Abbreviation Key  B= Belt DB= Dumbells T= Theratube   G=Gloves N= Noodles W= Weights   P= Paddles WW=Water Walker K= Kickboard        Transfers:   Steps ind       % Immersion: 75%            Ambulation:  Laps   ind Exercises UE:      Forwards 3 Shoulder Shrugs     Lateral  Shoulder circles     Marching    Scapular Retraction      Retro   Rolling      [place]  Push Downs    Multifidus walkouts w/paddle  IR/ER      Punching    Stretching: B   30 sec  Rowing    Gastroc  2 Elbow Flex/Ext    Hamstring   Shldr Flex/Ext     Knee flex stretch   Shldr Abd/Add    Piriformis   Horiz Abd/Add     Hip flexor    Arm Circles     SKTC PNF Diagonals    DKTC  UE oscillations f/b, s/s    ITB   Wall Push-ups    Quad  Figure 8's    Mid back   Buoy pull/push downs    UT  Tband rows    Rhom  Tband lats    Post Shoulder  Lumbar Rot w/ paddles    Pec   Small ball pull downs                   diagonals             Cervical:       AROM Flex       AROM Extension     LEs exercises:  B AROM Side Bending    Marching  10 AROM Rotation    Heel Raises  10 Chin tucks    Toe Raises  10 Chin nods     Squats  10      Hamstring Curls  10      Hip Flexion  10 Balance: Hip Abduction 10 SLS    Hip Circles 10 Tandem stance    TA set 10 NBOS eyes open    Glut Set 10 NBOS eyes closed    Hip Extension  Hand to Opposite Knee    Hip Adduction    Box Step     Hip IR   Noodle Stance     Hip ER  Stop/Go Gait    Fig 8's  Switch Gait                Seated: B Functional:    Ankle Pumps 10 Step up forward    Ankle circles 10 Step up lateral    Knee flex & ext 10 Step down    Hip Abd & Adduction 10 [place] squats    Bicycle  10 Crate Lifts    Add Set with ball 10 Lunges forward    LX stab with med ball throws  Lunges lateral    Ankle INV  Lunges retro    Ankle EV  Lower ab curl with noodle      Upper ab curl with ball      Med ball straight lifts      Med ball diagonal lifts      Hydrorider          Noodle:      Leg Press  Deep Water:     hang at wall 1 min   Jog    Noodle hang deep water  Jumping Jacks    Noodle Bicycle  Heel to toe      Hand to opposite knee      Cross country skier      Rocking Horse      Other Therapeutic Activities:  Pt was educated on PT POC, Diagnosis, Prognosis, pathomechanics as well as frequency and duration of scheduling future physical therapy appointments. Time was also taken on this day to answer all patient questions and participation in PT. Reviewed appointment policy in detail with patient and patient verbalized understanding.      Home Exercise Program:  Patient was instructed in the following for HEP:     . Patient verbalized/demonstrated understanding and was issued written handout. Charges: Therapeutic Exercise and NMR EXR  [x] (37430) Provided verbal/tactile cueing for activities related to strengthening, flexibility, endurance, ROM for improvements in LE, proximal hip, and core control with self care, mobility, lifting, ambulation. [x] (17126) Provided verbal/tactile cueing for activities related to improving balance, coordination, kinesthetic sense, posture, motor skill, proprioception  to assist with LE, proximal hip, and core control in self care, mobility, lifting, ambulation and eccentric single leg control. NMR and Therapeutic Activities:    [x] (24052 or 66870) Provided verbal/tactile cueing for activities related to improving balance, coordination, kinesthetic sense, posture, motor skill, proprioception and motor activation to allow for proper function of core, proximal hip and LE with self care and ADLs and functional mobility.    [x] (16546) Gait Re-education- Provided training and instruction to the patient for proper LE, core and proximal hip recruitment and positioning and eccentric body weight control with ambulation re-education including up and down stairs     Home Exercise Program:    [x] (18656) Reviewed/Progressed HEP activities related to strengthening, flexibility, endurance, ROM of core, proximal hip and LE for functional self-care, mobility, lifting and ambulation/stair navigation   [] (04843)Reviewed/Progressed HEP activities related to improving balance, coordination, kinesthetic sense, posture, motor skill, proprioception of core, proximal hip and LE for self care, mobility, lifting, and ambulation/stair navigation      Manual Treatments:  PROM / STM / Oscillations-Mobs:  G-I, II, III, IV (PA's, Inf., Post.)  [x] (33507) Provided manual therapy to mobilize LE, proximal hip and/or LS spine soft tissue/joints for the purpose of modulating pain, promoting relaxation,  increasing ROM, reducing/eliminating soft tissue swelling/inflammation/restriction, improving soft tissue extensibility and allowing for proper ROM for normal function with self care, mobility, lifting and ambulation. Individual Aquatic Therapy:  [x] (53850) Provided verbal and tactile cueing for strengthening, flexibility, ROM using the therapeutic properties of water (buoyancy, resistance) for improvements in core control, mobility and ambulation     Aquatic Group:  [x] (02851) Provided intermittent verbal and tactile cueing for strengthening, endurance, flexibility and ROM for 2-4 individuals that do not require one-on one patient contact by the therapist       Land Timed Code Treatment Minutes: 0   Land Total Treatment Minutes: 0     Individual Aquatic Minutes: 40   Aquatic Group Minutes:      [] EVAL (LOW) 08825 (typically 20 minutes face-to-face)  [] EVAL (MOD) 80188 (typically 30 minutes face-to-face)  [] EVAL (HIGH) 47233 (typically 45 minutes face-to-face)  [] RE-EVAL     [] AK(72594) x     [] Dry needle 1 or 2 Muscles (37509)  [] NMR (79826) x     [] Dry needle 3+ Muscles (97634)  [] Manual (53418) x     [] Ultrasound (09150) x  [] TA (07194) x     [] Mech Traction (89306)  [] ES(attended) (33798)     [] ES (un) (47842):   [] Vasopump (99452) [] Ionto (79779)   [x] Aquatic Ind (99055) x  3  [] Aquatic Group (76703) x   [] Other:       ASSESSMENT: Pt with long history of knee pain, prior TKR lida. With recent exacerbation of symptoms as well as lumbar and radicular pain. Back is much better post shots, right knee an issue that pt wants better before he leaves for vacation next week. 22 Pt would benefit from skilled aquatic therapy to  pain, increase ROM, flexibility, balance, endurance, gait, to improve function and ADL's. Patient required individual attention x 40 min for orientation to aquatic therapy, instruction and cues for correct exercise technique and count reps.               GOALS:  Patient stated goal: for his knee to not hurt  [] Progressing: [] Met: [] Not Met: [] Adjusted     Therapist goals for Patient:   Short Term Goals: To be achieved in: 2 weeks  1. Independent in HEP and progression per patient tolerance, in order to prevent re-injury. [] Progressing: [] Met: [] Not Met: [] Adjusted  2. Patient will have a decrease in pain to facilitate improvement in movement, function, and ADLs as indicated by Functional Deficits. [] Progressing: [] Met: [] Not Met: [] Adjusted     Long Term Goals: To be achieved in: 8 weeks  1. Increase FOTO functional outcome score from 36 to 50 to assist with reaching prior level of function. [] Progressing: [] Met: [] Not Met: [] Adjusted  2. Patient will demonstrate increased AROM to 5-115 to allow for proper joint functioning as indicated by patients Functional Deficits. [] Progressing: [] Met: [] Not Met: [] Adjusted  3. Patient will demonstrate an increase in Strength to good proximal hip strength and control, within 5lb HHD in LE to allow for proper functional mobility as indicated by patients Functional Deficits. [] Progressing: [] Met: [] Not Met: [] Adjusted  4. Patient will return to 50%  functional activities without increased symptoms or restriction. [] Progressing: [] Met: [] Not Met: [] Adjusted  5. To be able to walk more and not have my knee hurt. (patient specific functional goal)    [] Progressing: [] Met: [] Not Met: [] Adjusted           Treatment/Activity Tolerance:  [x] Patient tolerated treatment well [] Patient limited by fatique  [] Patient limited by pain  [] Patient limited by other medical complications  [] Other:     Overall Progression Towards Functional goals/ Treatment Progress Update:  [] Patient is progressing as expected towards functional goals listed. [] Progression is slowed due to complexities/Impairments listed. [] Progression has been slowed due to co-morbidities.   [x] Plan just implemented, too soon to assess goals progression <30days   [] Goals require adjustment due to lack of progress  [] Patient is not progressing as expected and requires additional follow up with physician  [] Other    Prognosis for POC: [x] Good [] Fair  [] Poor    Patient requires continued skilled intervention: [x] Yes  [] No        PLAN:   [] Continue per plan of care [] Alter current plan (see comments)  [x] Plan of care initiated [] Hold pending MD visit [] Discharge  Gradually progress aquatic exercise as tolerated to increase   ROM, strength, and endurance to improve ADL's and decrease pain. ADD: increase gt, seated ex reps, UE AROM with L-S stab/ balance, HSS, as tolerated. Electronically signed by: Randee Parisi,     Note: If patient does not return for scheduled/recommended follow up visits, this note will serve as a discharge from care along with the most recent update on progress.

## 2022-11-10 ENCOUNTER — HOSPITAL ENCOUNTER (OUTPATIENT)
Dept: PHYSICAL THERAPY | Age: 77
Setting detail: THERAPIES SERIES
Discharge: HOME | End: 2022-11-10
Payer: MEDICARE

## 2022-11-10 PROCEDURE — 97113 AQUATIC THERAPY/EXERCISES: CPT

## 2022-11-10 NOTE — FLOWSHEET NOTE
[facility] - Outpatient Rehabilitation and Therapy, [facility]  [address] [facility] [address] [facility]  Phone: [phone]   Fax:     [phone] [address] and [address] [place] [address], [facility]  [address] [facility]  Phone: [phone]   Fax:     [phone]      Physical Therapy Daily/Aquatic Flow Sheet   Date:  11/10/2022    Patient Name:  Thor Baum    :  1945  MRN: [id number]    Restrictions/Precautions:    Pertinent Medical History:     Medical/Treatment Diagnosis Information:   Medical Diagnosis: Pain in right knee [M25.561]  Meralgia paresthetica, right lower limb [G57.11]  Radiculopathy, lumbar region [M54.16]       Insurance/Certification information:   Gladis Latham  Physician Information:  ESTEBAN Louise *  Plan of care signed (Y/N): sent 10/28    Date of Patient follow up with Physician:      Progress Report: []  Yes  [x]  No     Date Range for reporting period:  Beginnin/10/2022  Ending:      Progress report due (10 Rx/or 30 days whichever is less): 15/48;28     Recertification due (POC duration/ or 90 days whichever is less): 22     Visit # POC/Insurance Allowable Auth Needed   3 / 9  AIM  9 visits  10-28-22 to 22 []Yes   []No     Latex Allergy:  [x]NO      []YES  Preferred Language for Healthcare:   [x]English       []Other:     Relevant Medical History:Additional Pertinent Hx: Arthritis     CAD (coronary artery disease)     Colon cancer ([address]  colon 2019 dec   High blood pressure     Hyperlipidemia      Atrial fibrillation (HCC)     CPAP (continuous positive airway pressure) dependence     Right and left TKR Gout     Sleep apnea   LB Sx  Functional Scale/Score: FOTO = 36     Filled out for his back - also a recent issue    History of Present condition -  recent history of LBP and right leg gain.   Had DRAGAN and nerve root block 4 days ago and feeling about 75% better. Right knee has always been acting up and pt wanted to come to PT to see if he could get to feeling better. Pain:   R TKR  3/10                  after Rx 3/10               LBP     2/10                                1/10        SUBJECTIVE: Back feels about 75% better  right knee hurts - Hurts if legs are hanging down, better if legs are on the ground. Now can walk without the cane  11-8-22 reports DRAGAN helpful, but wearing off. Is member at [facility]. Reports no R LE pain, just R knee. 11-10-22 reports tolerated first aquatic ex w/o increased S/S. Going on cruise in several weeks. To pool with st cane.                                                                  10/28 left            right  Knee ext 4/5 4/5   Knee Flex 4+/5 4+/5     Knee EXT (quad) -13 -15   Knee Flex (HS) 114 110       Land-based Visits Exercises/Activities: NO LAND RX TODAY  Therapeutic Ex (67748)  Min: Resistance/Repetitions Notes   Hamstring stretch add              Therapeutic Activity (48880) Min:     Pool tour, policies and procedures Pt verbalizes understanding    Use of cane for safety and stabiltiy          NMR re-education (05895) Min:  10     Add sets 5 sec x 10    Glut sets supine and seated Supine 5 sec x 10    Quad sets Supine 5 sec x 10         Manual Intervention (54001)  Min:                    Modalities  Min:  Recommended alternating ice/heat for pain control             Aquatic Visits Exercises/Activities:  Aquatic Abbreviation Key  B= Belt DB= Dumbells T= Theratube   G=Gloves N= Noodles W= Weights   P= Paddles WW=Water Walker K= Kickboard        Transfers:   Steps ind       % Immersion: 75%            Ambulation:  Laps   ind Exercises UE:  B w/ L-S stab and balance    Forwards 4 Shoulder Shrugs     Lateral  Shoulder circles     Marching    Scapular Retraction      Retro   Rolling  10    [place]  Push Downs 10   Multifidus walkouts w/paddle  IR/ER 10     Punching    Stretching: B   30 sec Rowing 10   Gastroc  2 Elbow Flex/Ext 10   Hamstring  2   gentle Shldr Flex/Ext     Knee flex stretch   Shldr Abd/Add    Piriformis   Horiz Abd/Add     Hip flexor    Arm Circles     SKTC   PNF Diagonals    DKTC  UE oscillations f/b, s/s    ITB   Wall Push-ups    Quad  Figure 8's    Mid back   Buoy pull/push downs    UT  Tband rows    Rhom  Tband lats    Post Shoulder  Lumbar Rot w/ paddles    Pec   Small ball pull downs                   diagonals             Cervical:       AROM Flex       AROM Extension     LEs exercises:  B AROM Side Bending    Marching  10 AROM Rotation    Heel Raises  10 Chin tucks    Toe Raises  10 Chin nods     Squats  10      Hamstring Curls  10      Hip Flexion  10 Balance: Hip Abduction 10 SLS    Hip Circles 10 Tandem stance    TA set 10 NBOS eyes open    Glut Set 10 NBOS eyes closed    Hip Extension  Hand to Opposite Knee    Hip Adduction    Box Step     Hip IR   Noodle Stance     Hip ER  Stop/Go Gait    Fig 8's  Switch Gait                Seated: B Functional:    Ankle Pumps 20 Step up forward    Ankle circles 10 Step up lateral    Knee flex & ext 20 Step down    Hip Abd & Adduction 20 [place] squats    Bicycle  20 Crate Lifts    Add Set with ball 10 Lunges forward    LX stab with med ball throws  Lunges lateral    Ankle INV  Lunges retro    Ankle EV  Lower ab curl with noodle      Upper ab curl with ball      Med ball straight lifts      Med ball diagonal lifts      Hydrorider          Noodle:      Leg Press  Deep Water:     hang at wall 1 min      relief  Jog    Noodle hang deep water  Jumping Jacks    Noodle Bicycle  Heel to toe      Hand to opposite knee      Cross country skier      Rocking Horse      Other Therapeutic Activities:  Pt was educated on PT POC, Diagnosis, Prognosis, pathomechanics as well as frequency and duration of scheduling future physical therapy appointments.  Time was also taken on this day to answer all patient questions and participation in PT. Reviewed appointment policy in detail with patient and patient verbalized understanding. Home Exercise Program:  Patient was instructed in the following for HEP:     . Patient verbalized/demonstrated understanding and was issued written handout. Charges: Therapeutic Exercise and NMR EXR  [x] (11813) Provided verbal/tactile cueing for activities related to strengthening, flexibility, endurance, ROM for improvements in LE, proximal hip, and core control with self care, mobility, lifting, ambulation. [x] (97093) Provided verbal/tactile cueing for activities related to improving balance, coordination, kinesthetic sense, posture, motor skill, proprioception  to assist with LE, proximal hip, and core control in self care, mobility, lifting, ambulation and eccentric single leg control. NMR and Therapeutic Activities:    [x] (66337 or 63205) Provided verbal/tactile cueing for activities related to improving balance, coordination, kinesthetic sense, posture, motor skill, proprioception and motor activation to allow for proper function of core, proximal hip and LE with self care and ADLs and functional mobility.    [x] (07072) Gait Re-education- Provided training and instruction to the patient for proper LE, core and proximal hip recruitment and positioning and eccentric body weight control with ambulation re-education including up and down stairs     Home Exercise Program:    [x] (56841) Reviewed/Progressed HEP activities related to strengthening, flexibility, endurance, ROM of core, proximal hip and LE for functional self-care, mobility, lifting and ambulation/stair navigation   [] (78927)Reviewed/Progressed HEP activities related to improving balance, coordination, kinesthetic sense, posture, motor skill, proprioception of core, proximal hip and LE for self care, mobility, lifting, and ambulation/stair navigation      Manual Treatments:  PROM / STM / Oscillations-Mobs:  G-I, II, III, IV (PA's, Inf., Post.)  [x] (68275) Provided manual therapy to mobilize LE, proximal hip and/or LS spine soft tissue/joints for the purpose of modulating pain, promoting relaxation,  increasing ROM, reducing/eliminating soft tissue swelling/inflammation/restriction, improving soft tissue extensibility and allowing for proper ROM for normal function with self care, mobility, lifting and ambulation. Individual Aquatic Therapy:  [x] (83553) Provided verbal and tactile cueing for strengthening, flexibility, ROM using the therapeutic properties of water (buoyancy, resistance) for improvements in core control, mobility and ambulation     Aquatic Group:  [x] (12842) Provided intermittent verbal and tactile cueing for strengthening, endurance, flexibility and ROM for 2-4 individuals that do not require one-on one patient contact by the therapist       Land Timed Code Treatment Minutes: 0   Land Total Treatment Minutes: 0     Individual Aquatic Minutes: 40   Aquatic Group Minutes: 0     [] EVAL (LOW) 60075 (typically 20 minutes face-to-face)  [] EVAL (MOD) 03319 (typically 30 minutes face-to-face)  [] EVAL (HIGH) 51434 (typically 45 minutes face-to-face)  [] RE-EVAL     [] VH(01284) x     [] Dry needle 1 or 2 Muscles (12702)  [] NMR (00674) x     [] Dry needle 3+ Muscles (07630)  [] Manual (41896) x     [] Ultrasound (14585) x  [] TA (62203) x     [] Mech Traction (82621)  [] ES(attended) (31528)     [] ES (un) (20677):   [] Vasopump (15240) [] Ionto (82530)   [x] Aquatic Ind (95751) x  3  [] Aquatic Group (41558) x   [] Other:       ASSESSMENT: Pt with long history of knee pain, prior TKR lida. With recent exacerbation of symptoms as well as lumbar and radicular pain. Back is much better post shots, right knee an issue that pt wants better before he leaves for vacation next week. 22 Pt would benefit from skilled aquatic therapy to  pain, increase ROM, flexibility, balance, endurance, gait, to improve function and ADL's.   Patient required individual attention x 40 min for orientation to aquatic therapy, instruction and cues for correct exercise technique and count reps. 11-10-22 tolerated new ex well. Cues to avoid excessive movements in between ex. GOALS:  Patient stated goal: for his knee to not hurt  [] Progressing: [] Met: [] Not Met: [] Adjusted     Therapist goals for Patient:   Short Term Goals: To be achieved in: 2 weeks  1. Independent in HEP and progression per patient tolerance, in order to prevent re-injury. [] Progressing: [] Met: [] Not Met: [] Adjusted  2. Patient will have a decrease in pain to facilitate improvement in movement, function, and ADLs as indicated by Functional Deficits. [] Progressing: [] Met: [] Not Met: [] Adjusted     Long Term Goals: To be achieved in: 8 weeks  1. Increase FOTO functional outcome score from 36 to 50 to assist with reaching prior level of function. [] Progressing: [] Met: [] Not Met: [] Adjusted  2. Patient will demonstrate increased AROM to 5-115 to allow for proper joint functioning as indicated by patients Functional Deficits. [] Progressing: [] Met: [] Not Met: [] Adjusted  3. Patient will demonstrate an increase in Strength to good proximal hip strength and control, within 5lb HHD in LE to allow for proper functional mobility as indicated by patients Functional Deficits. [] Progressing: [] Met: [] Not Met: [] Adjusted  4. Patient will return to 50%  functional activities without increased symptoms or restriction. [] Progressing: [] Met: [] Not Met: [] Adjusted  5.  To be able to walk more and not have my knee hurt. (patient specific functional goal)    [] Progressing: [] Met: [] Not Met: [] Adjusted           Treatment/Activity Tolerance:  [x] Patient tolerated treatment well [] Patient limited by fatique  [] Patient limited by pain  [] Patient limited by other medical complications  [] Other:     Overall Progression Towards Functional goals/ Treatment Progress Update:  [] Patient is progressing as expected towards functional goals listed. [] Progression is slowed due to complexities/Impairments listed. [] Progression has been slowed due to co-morbidities. [x] Plan just implemented, too soon to assess goals progression <30days   [] Goals require adjustment due to lack of progress  [] Patient is not progressing as expected and requires additional follow up with physician  [] Other    Prognosis for POC: [x] Good [] Fair  [] Poor    Patient requires continued skilled intervention: [x] Yes  [] No        PLAN:   [] Continue per plan of care [] Alter current plan (see comments)  [x] Plan of care initiated [] Hold pending MD visit [] Discharge  Gradually progress aquatic exercise as tolerated to increase   ROM, strength, and endurance to improve ADL's and decrease pain. ADD: increase gt, seated ex reps, UE AROM with L-S stab/ balance, as tolerated. Electronically signed by: Steffen Holt,     Note: If patient does not return for scheduled/recommended follow up visits, this note will serve as a discharge from care along with the most recent update on progress.

## 2022-11-11 ENCOUNTER — APPOINTMENT (OUTPATIENT)
Dept: PHYSICAL THERAPY | Age: 77
End: 2022-11-11
Payer: MEDICARE

## 2022-11-15 ENCOUNTER — HOSPITAL ENCOUNTER (OUTPATIENT)
Dept: PHYSICAL THERAPY | Age: 77
Setting detail: THERAPIES SERIES
Discharge: HOME | End: 2022-11-15
Payer: MEDICARE

## 2022-11-15 ENCOUNTER — APPOINTMENT (OUTPATIENT)
Dept: PHYSICAL THERAPY | Age: 77
End: 2022-11-15
Payer: MEDICARE

## 2022-11-15 PROCEDURE — 97113 AQUATIC THERAPY/EXERCISES: CPT

## 2022-11-15 PROCEDURE — 97150 GROUP THERAPEUTIC PROCEDURES: CPT

## 2022-11-15 NOTE — FLOWSHEET NOTE
[facility] - Outpatient Rehabilitation and Therapy, [facility]  [address] [facility] [address] [facility]  Phone: [phone]   Fax:     [phone] [address] [place] [address], [facility]  [address] [facility]  Phone: [phone]   Fax:     [phone]      Physical Therapy Daily/Aquatic Flow Sheet   Date:  11/15/2022    Patient Name:  Minerva Petersen    :  1945  MRN: [id number]    Restrictions/Precautions:    Pertinent Medical History:     Medical/Treatment Diagnosis Information:   Medical Diagnosis: Pain in right knee [M25.561]  Meralgia paresthetica, right lower limb [G57.11]  Radiculopathy, lumbar region [M54.16]       Insurance/Certification information:   Kevin Rome  Physician Information:  ESTEBAN Maldonado *  Plan of care signed (Y/N): sent 10/28    Date of Patient follow up with Physician:      Progress Report: []  Yes  [x]  No     Date Range for reporting period:  Beginnin/15/2022  Ending:      Progress report due (10 Rx/or 30 days whichever is less): /;81     Recertification due (POC duration/ or 90 days whichever is less): 22     Visit # POC/Insurance Allowable Auth Needed     AIM  9 visits  10-28-22 to 22 []Yes   []No     Latex Allergy:  [x]NO      []YES  Preferred Language for Healthcare:   [x]English       []Other:     Relevant Medical History:Additional Pertinent Hx: Arthritis     CAD (coronary artery disease)     Colon cancer ([facility] [address]  colon 2019 dec   High blood pressure     Hyperlipidemia      Atrial fibrillation (HCC)     CPAP (continuous positive airway pressure) dependence     Right and left TKR Gout     Sleep apnea   LB Sx  Functional Scale/Score: FOTO = 36     Filled out for his back - also a recent issue    History of Present condition -  recent history of LBP and right leg gain.   Had DRAGAN and nerve root block 4 days ago and Multifidus walkouts w/paddle  IR/ER 10     Punching    Stretching: B   30 sec  Rowing 10   Gastroc  2 Elbow Flex/Ext 10   Hamstring  2   gentle Shldr Flex/Ext  10   Knee flex stretch   Shldr Abd/Add 10   Piriformis   Horiz Abd/Add  10   Hip flexor    Arm Circles  10   SKTC   PNF Diagonals 10   DKTC  UE oscillations f/b, s/s    ITB   Wall Push-ups    Quad  Figure 8's    Mid back   Buoy pull/push downs    UT  Tband rows    Rhom  Tband lats    Post Shoulder  Lumbar Rot w/ paddles    Pec   Small ball pull downs                   diagonals             Cervical:       AROM Flex       AROM Extension     LEs exercises:  B AROM Side Bending    Marching  10 AROM Rotation    Heel Raises  10 Chin tucks    Toe Raises  10 Chin nods     Squats  10      Hamstring Curls  10      Hip Flexion  10 Balance: Hip Abduction 10 SLS    Hip Circles 10 Tandem stance    TA set 10 NBOS eyes open    Glut Set 10 NBOS eyes closed    Hip Extension  Hand to Opposite Knee 10   Hip Adduction    Box Step     Hip IR   Noodle Stance     Hip ER  Stop/Go Gait    Fig 8's  Switch Gait                Seated: B Functional:    Ankle Pumps 30 Step up forward    Ankle circles 10 Step up lateral    Knee flex & ext 30 Step down    Hip Abd & Adduction 30 [place] squats    Bicycle  30 Crate Lifts    Add Set with ball 10 Lunges forward    LX stab with med ball throws  Lunges lateral    Ankle INV  Lunges retro    Ankle EV  Lower ab curl with noodle      Upper ab curl with ball      Med ball straight lifts      Med ball diagonal lifts      Hydrorider          Noodle:      Leg Press  Deep Water:     hang at wall 1 min      relief  Jog    Noodle hang deep water  Jumping Jacks    Noodle Bicycle  Heel to toe      Hand to opposite knee      Cross country skier      Rocking Horse      Other Therapeutic Activities:  Pt was educated on PT POC, Diagnosis, Prognosis, pathomechanics as well as frequency and duration of scheduling future physical therapy appointments. Time was also taken on this day to answer all patient questions and participation in PT. Reviewed appointment policy in detail with patient and patient verbalized understanding. Home Exercise Program:  Patient was instructed in the following for HEP:     . Patient verbalized/demonstrated understanding and was issued written handout. Charges: Therapeutic Exercise and NMR EXR  [x] (27519) Provided verbal/tactile cueing for activities related to strengthening, flexibility, endurance, ROM for improvements in LE, proximal hip, and core control with self care, mobility, lifting, ambulation. [x] (66175) Provided verbal/tactile cueing for activities related to improving balance, coordination, kinesthetic sense, posture, motor skill, proprioception  to assist with LE, proximal hip, and core control in self care, mobility, lifting, ambulation and eccentric single leg control. NMR and Therapeutic Activities:    [x] (24500 or 17636) Provided verbal/tactile cueing for activities related to improving balance, coordination, kinesthetic sense, posture, motor skill, proprioception and motor activation to allow for proper function of core, proximal hip and LE with self care and ADLs and functional mobility.    [x] (30255) Gait Re-education- Provided training and instruction to the patient for proper LE, core and proximal hip recruitment and positioning and eccentric body weight control with ambulation re-education including up and down stairs     Home Exercise Program:    [x] (18412) Reviewed/Progressed HEP activities related to strengthening, flexibility, endurance, ROM of core, proximal hip and LE for functional self-care, mobility, lifting and ambulation/stair navigation   [] (26136)Reviewed/Progressed HEP activities related to improving balance, coordination, kinesthetic sense, posture, motor skill, proprioception of core, proximal hip and LE for self care, mobility, lifting, and ambulation/stair navigation Manual Treatments:  PROM / STM / Oscillations-Mobs:  G-I, II, III, IV (PA's, Inf., Post.)  [x] (44651) Provided manual therapy to mobilize LE, proximal hip and/or LS spine soft tissue/joints for the purpose of modulating pain, promoting relaxation,  increasing ROM, reducing/eliminating soft tissue swelling/inflammation/restriction, improving soft tissue extensibility and allowing for proper ROM for normal function with self care, mobility, lifting and ambulation. Individual Aquatic Therapy:  [x] (87606) Provided verbal and tactile cueing for strengthening, flexibility, ROM using the therapeutic properties of water (buoyancy, resistance) for improvements in core control, mobility and ambulation     Aquatic Group:  [x] (04827) Provided intermittent verbal and tactile cueing for strengthening, endurance, flexibility and ROM for 2-4 individuals that do not require one-on one patient contact by the therapist       Land Timed Code Treatment Minutes: 0   Land Total Treatment Minutes: 0     Individual Aquatic Minutes: 15   Aquatic Group Minutes: 30     [] EVAL (LOW) 33854 (typically 20 minutes face-to-face)  [] EVAL (MOD) 22929 (typically 30 minutes face-to-face)  [] EVAL (HIGH) 05571 (typically 45 minutes face-to-face)  [] RE-EVAL     [] FQ(28459) x     [] Dry needle 1 or 2 Muscles (59227)  [] NMR (93449) x     [] Dry needle 3+ Muscles (25726)  [] Manual (73493) x     [] Ultrasound (69148) x  [] TA (67723) x     [] Mech Traction (85015)  [] ES(attended) (79791)     [] ES (un) (86227):   [] Vasopump (67551) [] Ionto (15715)   [x] Aquatic Ind (63494) x   [x] Aquatic Group (36838) x   [] Other:       ASSESSMENT: Pt with long history of knee pain, prior TKR lida. With recent exacerbation of symptoms as well as lumbar and radicular pain. Back is much better post shots, right knee an issue that pt wants better before he leaves for vacation next week.   22 Pt would benefit from skilled aquatic therapy to  pain, increase ROM, flexibility, balance, endurance, gait, to improve function and ADL's. Patient required individual attention x 40 min for orientation to aquatic therapy, instruction and cues for correct exercise technique and count reps. 11-10-22 tolerated new ex well. Cues to avoid excessive movements in between ex.   11-15-22 gradually progressing with aquatic ex. GOALS:  Patient stated goal: for his knee to not hurt  [] Progressing: [] Met: [] Not Met: [] Adjusted     Therapist goals for Patient:   Short Term Goals: To be achieved in: 2 weeks  1. Independent in HEP and progression per patient tolerance, in order to prevent re-injury. [] Progressing: [] Met: [] Not Met: [] Adjusted  2. Patient will have a decrease in pain to facilitate improvement in movement, function, and ADLs as indicated by Functional Deficits. [] Progressing: [] Met: [] Not Met: [] Adjusted     Long Term Goals: To be achieved in: 8 weeks  1. Increase FOTO functional outcome score from 36 to 50 to assist with reaching prior level of function. [] Progressing: [] Met: [] Not Met: [] Adjusted  2. Patient will demonstrate increased AROM to 5-115 to allow for proper joint functioning as indicated by patients Functional Deficits. [] Progressing: [] Met: [] Not Met: [] Adjusted  3. Patient will demonstrate an increase in Strength to good proximal hip strength and control, within 5lb HHD in LE to allow for proper functional mobility as indicated by patients Functional Deficits. [] Progressing: [] Met: [] Not Met: [] Adjusted  4. Patient will return to 50%  functional activities without increased symptoms or restriction. [] Progressing: [] Met: [] Not Met: [] Adjusted  5.  To be able to walk more and not have my knee hurt. (patient specific functional goal)    [] Progressing: [] Met: [] Not Met: [] Adjusted           Treatment/Activity Tolerance:  [x] Patient tolerated treatment well [] Patient limited by melody  [] Patient limited by pain  [] Patient limited by other medical complications  [] Other:     Overall Progression Towards Functional goals/ Treatment Progress Update:  [] Patient is progressing as expected towards functional goals listed. [] Progression is slowed due to complexities/Impairments listed. [] Progression has been slowed due to co-morbidities. [x] Plan just implemented, too soon to assess goals progression <30days   [] Goals require adjustment due to lack of progress  [] Patient is not progressing as expected and requires additional follow up with physician  [] Other    Prognosis for POC: [x] Good [] Fair  [] Poor    Patient requires continued skilled intervention: [x] Yes  [] No        PLAN:   [] Continue per plan of care [] Alter current plan (see comments)  [x] Plan of care initiated [] Hold pending MD visit [] Discharge  Gradually progress aquatic exercise as tolerated to increase   ROM, strength, and endurance to improve ADL's and decrease pain. ADD: increase gt, step ups fwds, balance, as tolerated. Electronically signed by: Yuliana Otto,     Note: If patient does not return for scheduled/recommended follow up visits, this note will serve as a discharge from care along with the most recent update on progress.

## 2022-11-17 ENCOUNTER — HOSPITAL ENCOUNTER (OUTPATIENT)
Dept: PHYSICAL THERAPY | Age: 77
Setting detail: THERAPIES SERIES
Discharge: HOME | End: 2022-11-17
Payer: MEDICARE

## 2022-11-17 PROCEDURE — 97113 AQUATIC THERAPY/EXERCISES: CPT

## 2022-11-17 NOTE — FLOWSHEET NOTE
[facility] - Outpatient [facility] and Therapy, [place]  [address] [facility] [address] [facility] [address]  Phone: [phone]   Fax:     [phone] [address] [facility], [place]  [address]  Phone: [phone]   Fax:     [phone]      Physical Therapy Daily/Aquatic Flow Sheet   Date:  2022    Patient Name:  Azra Morales    :  1945  MRN: [id number]    Restrictions/Precautions:    Pertinent Medical History:     Medical/Treatment Diagnosis Information:   Medical Diagnosis: Pain in right knee [M25.561]  Meralgia paresthetica, right lower limb [G57.11]  Radiculopathy, lumbar region [M54.16]       Insurance/Certification information:   Kevin Rome6  Physician Information:  ESTEBAN Ruvalcaba *  Plan of care signed (Y/N): sent 10/28    Date of Patient follow up with Physician:      Progress Report: []  Yes  [x]  No     Date Range for reporting period:  Beginnin2022  Ending:      Progress report due (10 Rx/or 30 days whichever is less):      Recertification due (POC duration/ or 90 days whichever is less): 22     Visit # POC/Insurance Allowable Auth Needed     AIM  9 visits  10-28-22 to 22 []Yes   []No     Latex Allergy:  [x]NO      []YES  Preferred Language for Healthcare:   [x]English       []Other:     Relevant Medical History:Additional Pertinent Hx: Arthritis     CAD (coronary artery disease)     Colon cancer ([facility] [address]  colon 2019 dec   High blood pressure     Hyperlipidemia      Atrial fibrillation (HCC)     CPAP (continuous positive airway pressure) dependence     Right and left TKR Gout     Sleep apnea   LB Sx  Functional Scale/Score: FOTO = 36     Filled out for his back - also a recent issue    History of Present condition -  recent history of LBP and right leg gain.   Had DRAGAN and nerve root block 4 days ago and feeling about 75% better. Right knee has always been acting up and pt wanted to come to PT to see if he could get to feeling better. Pain:   R TKR  2/10                  after Rx 1/10               LBP     1/10                                0/10        SUBJECTIVE: Back feels about 75% better  right knee hurts - Hurts if legs are hanging down, better if legs are on the ground. Now can walk without the cane  11-8-22 reports DRAGAN helpful, but wearing off. Is member at [facility]. Reports no R LE pain, just R knee. 11-10-22 reports tolerated first aquatic ex w/o increased S/S. Going on cruise in several weeks. To pool with st cane. 11-15-22 tolerated last aquatic ex well. Reports has own pool at home. 11-17-22  Chief c/o R knee. Reports aquatic ex easier to do than land.                                                                  10/28 left            right  Knee ext 4/5 4/5   Knee Flex 4+/5 4+/5     Knee EXT (quad) -13 -15   Knee Flex (HS) 114 110       Land-based Visits Exercises/Activities: NO LAND RX TODAY  Therapeutic Ex (72098)  Min: Resistance/Repetitions Notes   Hamstring stretch add              Therapeutic Activity (05649) Min:     Pool tour, policies and procedures Pt verbalizes understanding    Use of cane for safety and stabiltiy          NMR re-education (53724) Min:  10     Add sets 5 sec x 10    Glut sets supine and seated Supine 5 sec x 10    Quad sets Supine 5 sec x 10         Manual Intervention (56723)  Min:                    Modalities  Min:  Recommended alternating ice/heat for pain control             Aquatic Visits Exercises/Activities:  Aquatic Abbreviation Key  B= Belt DB= Dumbells T= Theratube   G=Gloves N= Noodles W= Weights   P= Paddles WW=Water Walker K= Kickboard        Transfers:   Steps ind       % Immersion: 75%            Ambulation:  Laps   ind Exercises UE:  B w/ L-S stab and balance    Forwards 4 Shoulder Shrugs     Lateral 1 Shoulder circles     Marching  1 Scapular Retraction      Retro   Rolling  10    [place]  Push Downs 10   Multifidus walkouts w/paddle  IR/ER 10     Punching    Stretching: B   30 sec  Rowing 10   Gastroc  2 Elbow Flex/Ext 10   Hamstring  2   gentle Shldr Flex/Ext  10   Knee flex stretch   Shldr Abd/Add 10   Piriformis   Horiz Abd/Add  10   Hip flexor    Arm Circles  10   SKTC   PNF Diagonals 10   DKTC  UE oscillations f/b, s/s    ITB   Wall Push-ups    Quad  Figure 8's    Mid back   Buoy pull/push downs    UT  Tband rows    Rhom  Tband lats    Post Shoulder  Lumbar Rot w/ paddles    Pec   Small ball pull downs                   diagonals             Cervical:       AROM Flex       AROM Extension     LEs exercises:  B AROM Side Bending    Marching  10 AROM Rotation    Heel Raises  10 Chin tucks    Toe Raises  10 Chin nods     Squats  10      Hamstring Curls  10      Hip Flexion  10 Balance:      Hip Abduction 10 SLS    Hip Circles 10 Tandem stance    TA set 10 NBOS eyes open    Glut Set 10 NBOS eyes closed    Hip Extension  Hand to Opposite Knee 10   Hip Adduction    Box Step     Hip IR   Noodle Stance     Hip ER  Stop/Go Gait    Fig 8's  Switch Gait                Seated: B Functional:    Ankle Pumps 30 Step up forward 5 R/L   Ankle circles 10 Step up lateral    Knee flex & ext 30 Step down    Hip Abd & Adduction 30 Argo squats    Bicycle  30 Crate Lifts    Add Set with ball 10 Lunges forward    LX stab with med ball throws  Lunges lateral    Ankle INV  Lunges retro    Ankle EV  Lower ab curl with noodle      Upper ab curl with ball      Med ball straight lifts      Med ball diagonal lifts      Hydrorider          Noodle:      Leg Press  Deep Water:     hang at wall 2 min      relief  Jog    Noodle hang deep water  Jumping Jacks    Noodle Bicycle  Heel to toe      Hand to opposite knee      Cross country skier      Rocking Horse      Other Therapeutic Activities:  Pt was educated on PT POC, Diagnosis, Prognosis, pathomechanics as well as frequency and duration of scheduling future physical therapy appointments. Time was also taken on this day to answer all patient questions and participation in PT. Reviewed appointment policy in detail with patient and patient verbalized understanding. Home Exercise Program:  Patient was instructed in the following for HEP:     . Patient verbalized/demonstrated understanding and was issued written handout. Charges: Therapeutic Exercise and NMR EXR  [x] (63024) Provided verbal/tactile cueing for activities related to strengthening, flexibility, endurance, ROM for improvements in LE, proximal hip, and core control with self care, mobility, lifting, ambulation. [x] (21619) Provided verbal/tactile cueing for activities related to improving balance, coordination, kinesthetic sense, posture, motor skill, proprioception  to assist with LE, proximal hip, and core control in self care, mobility, lifting, ambulation and eccentric single leg control. NMR and Therapeutic Activities:    [x] (91340 or 97507) Provided verbal/tactile cueing for activities related to improving balance, coordination, kinesthetic sense, posture, motor skill, proprioception and motor activation to allow for proper function of core, proximal hip and LE with self care and ADLs and functional mobility.    [x] (02451) Gait Re-education- Provided training and instruction to the patient for proper LE, core and proximal hip recruitment and positioning and eccentric body weight control with ambulation re-education including up and down stairs     Home Exercise Program:    [x] (32564) Reviewed/Progressed HEP activities related to strengthening, flexibility, endurance, ROM of core, proximal hip and LE for functional self-care, mobility, lifting and ambulation/stair navigation   [] (27378)Reviewed/Progressed HEP activities related to improving balance, coordination, kinesthetic sense, posture, motor skill, proprioception of core, proximal hip and LE for self care, mobility, lifting, and ambulation/stair navigation      Manual Treatments:  PROM / STM / Oscillations-Mobs:  G-I, II, III, IV (PA's, Inf., Post.)  [x] (72792) Provided manual therapy to mobilize LE, proximal hip and/or LS spine soft tissue/joints for the purpose of modulating pain, promoting relaxation,  increasing ROM, reducing/eliminating soft tissue swelling/inflammation/restriction, improving soft tissue extensibility and allowing for proper ROM for normal function with self care, mobility, lifting and ambulation. Individual Aquatic Therapy:  [x] (30994) Provided verbal and tactile cueing for strengthening, flexibility, ROM using the therapeutic properties of water (buoyancy, resistance) for improvements in core control, mobility and ambulation     Aquatic Group:  [x] (56345) Provided intermittent verbal and tactile cueing for strengthening, endurance, flexibility and ROM for 2-4 individuals that do not require one-on one patient contact by the therapist       Land Timed Code Treatment Minutes: 0   Land Total Treatment Minutes: 0     Individual Aquatic Minutes: 55   Aquatic Group Minutes: 0     [] EVAL (LOW) 00141 (typically 20 minutes face-to-face)  [] EVAL (MOD) 44445 (typically 30 minutes face-to-face)  [] EVAL (HIGH) 44466 (typically 45 minutes face-to-face)  [] RE-EVAL     [] CH(90316) x     [] Dry needle 1 or 2 Muscles (71056)  [] NMR (15896) x     [] Dry needle 3+ Muscles (93213)  [] Manual (34464) x     [] Ultrasound (95734) x  [] TA (66129) x     [] Mech Traction (00494)  [] ES(attended) (06303)     [] ES (un) (50933):   [] Vasopump (19720) [] Ionto (74802)   [x] Aquatic Ind (44635) x 4  [] Aquatic Group (40813) x   [] Other:       ASSESSMENT: Pt with long history of knee pain, prior TKR lida. With recent exacerbation of symptoms as well as lumbar and radicular pain. Back is much better post shots, right knee an issue that pt wants better before he leaves for vacation next week.   11-8-22 Pt would benefit from skilled aquatic therapy to  pain, increase ROM, flexibility, balance, endurance, gait, to improve function and ADL's. Patient required individual attention x 40 min for orientation to aquatic therapy, instruction and cues for correct exercise technique and count reps. 11-10-22 tolerated new ex well. Cues to avoid excessive movements in between ex.   11-15-22 gradually progressing with aquatic ex.   22 Tolerating aquatic ex well with decreased pain afterwards. Pt. Instructed in easier way to go up/ down steps to protect R knee. Pt prefers to go down with L and up with R first.             GOALS:  Patient stated goal: for his knee to not hurt  [] Progressing: [] Met: [] Not Met: [] Adjusted     Therapist goals for Patient:   Short Term Goals: To be achieved in: 2 weeks  1. Independent in HEP and progression per patient tolerance, in order to prevent re-injury. [] Progressing: [] Met: [] Not Met: [] Adjusted  2. Patient will have a decrease in pain to facilitate improvement in movement, function, and ADLs as indicated by Functional Deficits. [] Progressing: [] Met: [] Not Met: [] Adjusted     Long Term Goals: To be achieved in: 8 weeks  1. Increase FOTO functional outcome score from 36 to 50 to assist with reaching prior level of function. [] Progressing: [] Met: [] Not Met: [] Adjusted  2. Patient will demonstrate increased AROM to 5-115 to allow for proper joint functioning as indicated by patients Functional Deficits. [] Progressing: [] Met: [] Not Met: [] Adjusted  3. Patient will demonstrate an increase in Strength to good proximal hip strength and control, within 5lb HHD in LE to allow for proper functional mobility as indicated by patients Functional Deficits. [] Progressing: [] Met: [] Not Met: [] Adjusted  4. Patient will return to 50%  functional activities without increased symptoms or restriction. [] Progressing: [] Met: [] Not Met: [] Adjusted  5.  To be able to walk more and not have my knee hurt. (patient specific functional goal)    [] Progressing: [] Met: [] Not Met: [] Adjusted           Treatment/Activity Tolerance:  [x] Patient tolerated treatment well [] Patient limited by fatique  [] Patient limited by pain  [] Patient limited by other medical complications  [] Other:     Overall Progression Towards Functional goals/ Treatment Progress Update:  [] Patient is progressing as expected towards functional goals listed. [] Progression is slowed due to complexities/Impairments listed. [] Progression has been slowed due to co-morbidities. [x] Plan just implemented, too soon to assess goals progression <30days   [] Goals require adjustment due to lack of progress  [] Patient is not progressing as expected and requires additional follow up with physician  [] Other    Prognosis for POC: [x] Good [] Fair  [] Poor    Patient requires continued skilled intervention: [x] Yes  [] No        PLAN:   [] Continue per plan of care [] Alter current plan (see comments)  [x] Plan of care initiated [] Hold pending MD visit [] Discharge  Gradually progress aquatic exercise as tolerated to increase   ROM, strength, and endurance to improve ADL's and decrease pain. ADD: increase gt, step ups fwds, balance,  box step, squats. as tolerated. Electronically signed by: Brenden Anderson,     Note: If patient does not return for scheduled/recommended follow up visits, this note will serve as a discharge from care along with the most recent update on progress.

## 2022-11-18 ENCOUNTER — APPOINTMENT (OUTPATIENT)
Dept: PHYSICAL THERAPY | Age: 77
End: 2022-11-18
Payer: MEDICARE

## 2022-11-22 ENCOUNTER — APPOINTMENT (OUTPATIENT)
Dept: PHYSICAL THERAPY | Age: 77
End: 2022-11-22
Payer: MEDICARE

## 2022-11-22 ENCOUNTER — HOSPITAL ENCOUNTER (OUTPATIENT)
Dept: PHYSICAL THERAPY | Age: 77
Setting detail: THERAPIES SERIES
Discharge: HOME | End: 2022-11-22
Payer: MEDICARE

## 2022-11-22 PROCEDURE — 97150 GROUP THERAPEUTIC PROCEDURES: CPT

## 2022-11-22 PROCEDURE — 97113 AQUATIC THERAPY/EXERCISES: CPT

## 2022-11-22 NOTE — FLOWSHEET NOTE
[facility] - Outpatient Rehabilitation and Therapy, [facility]  [address] [facility]  Phone: [phone]   Fax:     [phone] [address] [place] [address], [facility]  [address] [facility]  Phone: [phone]   Fax:     [phone]      Physical Therapy Daily/Aquatic Flow Sheet   Date:  2022    Patient Name:  Minta Najjar    :  1945  MRN: [id number]    Restrictions/Precautions:    Pertinent Medical History:     Medical/Treatment Diagnosis Information:   Medical Diagnosis: Pain in right knee [M25.561]  Meralgia paresthetica, right lower limb [G57.11]  Radiculopathy, lumbar region [M54.16]       Insurance/Certification information:   Layla Ambriz  Physician Information:  ESTEBAN Root *  Plan of care signed (Y/N): sent 10/28    Date of Patient follow up with Physician:      Progress Report: []  Yes  [x]  No     Date Range for reporting period:  Beginnin2022  Ending:      Progress report due (10 Rx/or 30 days whichever is less): ;     Recertification due (POC duration/ or 90 days whichever is less): 22     Visit # POC/Insurance Allowable Auth Needed     AIM  9 visits  10-28-22 to 22 []Yes   []No     Latex Allergy:  [x]NO      []YES  Preferred Language for Healthcare:   [x]English       []Other:     Relevant Medical History:Additional Pertinent Hx: Arthritis     CAD (coronary artery disease)     Colon cancer ([facility] [address]  colon 2019 dec   High blood pressure     Hyperlipidemia      Atrial fibrillation (HCC)     CPAP (continuous positive airway pressure) dependence     Right and left TKR Gout     Sleep apnea   LB Sx  Functional Scale/Score: FOTO = 36     Filled out for his back - also a recent issue    History of Present condition -  recent history of LBP and right leg gain.   Had DRAGAN and nerve root block 4 days ago and feeling about 75% better. Right knee has always been acting up and pt wanted to come to PT to see if he could get to feeling better. Pain:   R TKR  2/10                  after Rx 2/10               LBP     1/10                                1/10        SUBJECTIVE: Back feels about 75% better  right knee hurts - Hurts if legs are hanging down, better if legs are on the ground. Now can walk without the cane  11-8-22 reports DRAGAN helpful, but wearing off. Is member at [facility]. Reports no R LE pain, just R knee. 11-10-22 reports tolerated first aquatic ex w/o increased S/S. Going on cruise in several weeks. To pool with st cane. 11-15-22 tolerated last aquatic ex well. Reports has own pool at home. 11-17-22  Chief c/o R knee. Reports aquatic ex easier to do than land. 11-22-22  no new c/o, going on cruise next week.                                                                  10/28 left            right  Knee ext 4/5 4/5   Knee Flex 4+/5 4+/5     Knee EXT (quad) -13 -15   Knee Flex (HS) 114 110       Land-based Visits Exercises/Activities: NO LAND RX TODAY  Therapeutic Ex (36824)  Min: Resistance/Repetitions Notes   Hamstring stretch add              Therapeutic Activity (42436) Min:     Pool tour, policies and procedures Pt verbalizes understanding    Use of cane for safety and stabiltiy          NMR re-education (20900) Min:  10     Add sets 5 sec x 10    Glut sets supine and seated Supine 5 sec x 10    Quad sets Supine 5 sec x 10         Manual Intervention (16669)  Min:                    Modalities  Min:  Recommended alternating ice/heat for pain control             Aquatic Visits Exercises/Activities:  Aquatic Abbreviation Key  B= Belt DB= Dumbells T= Theratube   G=Gloves N= Noodles W= Weights   P= Paddles WW=Water Walker K= Kickboard        Transfers:   Steps ind       % Immersion: 75%            Ambulation:  Laps   ind Exercises UE:  B w/ L-S stab and balance    Forwards 4 Shoulder Shrugs Lateral 1 Shoulder circles     Marching  1 Scapular Retraction      Retro   Rolling  10    [place]  Push Downs 10   Multifidus walkouts w/paddle  IR/ER 10     Punching    Stretching: B   30 sec  Rowing 10   Gastroc  2 Elbow Flex/Ext 10   Hamstring  2   gentle Shldr Flex/Ext  10   Knee flex stretch   Shldr Abd/Add 10   Piriformis   Horiz Abd/Add  10   Hip flexor    Arm Circles  10   SKTC   PNF Diagonals 10   DKTC  UE oscillations f/b, s/s    ITB   Wall Push-ups    Quad  Figure 8's 10   Mid back   Buoy pull/push downs    UT  Tband rows    Rhom  Tband lats    Post Shoulder  Lumbar Rot w/ paddles    Pec   Small ball pull downs                   diagonals             Cervical:       AROM Flex       AROM Extension     LEs exercises:  B AROM Side Bending    Marching  10 AROM Rotation    Heel Raises  10 Chin tucks    Toe Raises  10 Chin nods     Squats  10      Hamstring Curls  10      Hip Flexion  10 Balance:      Hip Abduction 10 SLS    Hip Circles 10 Tandem stance 30 sec R/L   TA set 10 NBOS eyes open 30 sec    Glut Set 10 NBOS eyes closed    Hip Extension  Hand to Opposite Knee 10   Hip Adduction    Box Step     Hip IR   Noodle Stance     Hip ER  Stop/Go Gait    Fig 8's  Switch Gait                Seated: B Functional:    Ankle Pumps 30 Step up forward 10 R/L   Ankle circles 10 Step up lateral    Knee flex & ext 30 Step down    Hip Abd & Adduction 30 [place] squats    Bicycle  30 Crate Lifts    Add Set with ball 10 Lunges forward    LX stab with med ball throws  Lunges lateral    Ankle INV  Lunges retro    Ankle EV  Lower ab curl with noodle      Upper ab curl with ball      Med ball straight lifts      Med ball diagonal lifts      Hydrorider          Noodle:      Leg Press  Deep Water:    Jog    Noodle hang deep water  Jumping Jacks    Noodle Bicycle  Heel to toe      Hand to opposite knee      Cross country skier      Rocking Horse      Other Therapeutic Activities:  Pt was educated on PT POC, Diagnosis, Prognosis, pathomechanics as well as frequency and duration of scheduling future physical therapy appointments. Time was also taken on this day to answer all patient questions and participation in PT. Reviewed appointment policy in detail with patient and patient verbalized understanding. Home Exercise Program:  Patient was instructed in the following for HEP:     . Patient verbalized/demonstrated understanding and was issued written handout. Charges: Therapeutic Exercise and NMR EXR  [x] (70364) Provided verbal/tactile cueing for activities related to strengthening, flexibility, endurance, ROM for improvements in LE, proximal hip, and core control with self care, mobility, lifting, ambulation. [x] (86617) Provided verbal/tactile cueing for activities related to improving balance, coordination, kinesthetic sense, posture, motor skill, proprioception  to assist with LE, proximal hip, and core control in self care, mobility, lifting, ambulation and eccentric single leg control. NMR and Therapeutic Activities:    [x] (03428 or 18035) Provided verbal/tactile cueing for activities related to improving balance, coordination, kinesthetic sense, posture, motor skill, proprioception and motor activation to allow for proper function of core, proximal hip and LE with self care and ADLs and functional mobility.    [x] (49175) Gait Re-education- Provided training and instruction to the patient for proper LE, core and proximal hip recruitment and positioning and eccentric body weight control with ambulation re-education including up and down stairs     Home Exercise Program:    [x] (35754) Reviewed/Progressed HEP activities related to strengthening, flexibility, endurance, ROM of core, proximal hip and LE for functional self-care, mobility, lifting and ambulation/stair navigation   [] (25146)Reviewed/Progressed HEP activities related to improving balance, coordination, kinesthetic sense, posture, motor skill, proprioception of core, proximal hip and LE for self care, mobility, lifting, and ambulation/stair navigation      Manual Treatments:  PROM / STM / Oscillations-Mobs:  G-I, II, III, IV (PA's, Inf., Post.)  [x] (80596) Provided manual therapy to mobilize LE, proximal hip and/or LS spine soft tissue/joints for the purpose of modulating pain, promoting relaxation,  increasing ROM, reducing/eliminating soft tissue swelling/inflammation/restriction, improving soft tissue extensibility and allowing for proper ROM for normal function with self care, mobility, lifting and ambulation. Individual Aquatic Therapy:  [x] (66087) Provided verbal and tactile cueing for strengthening, flexibility, ROM using the therapeutic properties of water (buoyancy, resistance) for improvements in core control, mobility and ambulation     Aquatic Group:  [x] (17425) Provided intermittent verbal and tactile cueing for strengthening, endurance, flexibility and ROM for 2-4 individuals that do not require one-on one patient contact by the therapist       Land Timed Code Treatment Minutes: 0   Land Total Treatment Minutes: 0     Individual Aquatic Minutes: 15   Aquatic Group Minutes: 35     [] EVAL (LOW) 60126 (typically 20 minutes face-to-face)  [] EVAL (MOD) 54055 (typically 30 minutes face-to-face)  [] EVAL (HIGH) 31465 (typically 45 minutes face-to-face)  [] RE-EVAL     [] BE(07797) x     [] Dry needle 1 or 2 Muscles (81892)  [] NMR (96369) x     [] Dry needle 3+ Muscles (48416)  [] Manual (27149) x     [] Ultrasound (38748) x  [] TA (05895) x     [] [facility] (77288[facility]  [] ES(attended) (77523)     [] ES (un) (50044):   [] Vasopump (88420) [] Ionto (35337)   [x] Aquatic Ind (30456) x  [x] Aquatic Group (29211) x   [] Other:       ASSESSMENT: Pt with long history of knee pain, prior TKR lida. With recent exacerbation of symptoms as well as lumbar and radicular pain.   Back is much better post shots, right knee an issue that pt wants better before he leaves for vacation next week. 22 Pt would benefit from skilled aquatic therapy to  pain, increase ROM, flexibility, balance, endurance, gait, to improve function and ADL's. Patient required individual attention x 40 min for orientation to aquatic therapy, instruction and cues for correct exercise technique and count reps. 11-10-22 tolerated new ex well. Cues to avoid excessive movements in between ex.   11-15-22 gradually progressing with aquatic ex.   22 Tolerating aquatic ex well with decreased pain afterwards. Pt. Instructed in easier way to go up/ down steps to protect R knee. Pt prefers to go down with L and up with R first.   22 discussed recommendations/ precautions with doing aquatic ex in pool on cruise ship. GOALS:  Patient stated goal: for his knee to not hurt  [] Progressing: [] Met: [] Not Met: [] Adjusted     Therapist goals for Patient:   Short Term Goals: To be achieved in: 2 weeks  1. Independent in HEP and progression per patient tolerance, in order to prevent re-injury. [] Progressing: [] Met: [] Not Met: [] Adjusted  2. Patient will have a decrease in pain to facilitate improvement in movement, function, and ADLs as indicated by Functional Deficits. [] Progressing: [] Met: [] Not Met: [] Adjusted     Long Term Goals: To be achieved in: 8 weeks  1. Increase FOTO functional outcome score from 36 to 50 to assist with reaching prior level of function. [] Progressing: [] Met: [] Not Met: [] Adjusted  2. Patient will demonstrate increased AROM to 5-115 to allow for proper joint functioning as indicated by patients Functional Deficits. [] Progressing: [] Met: [] Not Met: [] Adjusted  3. Patient will demonstrate an increase in Strength to good proximal hip strength and control, within 5lb HHD in LE to allow for proper functional mobility as indicated by patients Functional Deficits. [] Progressing: [] Met: [] Not Met: [] Adjusted  4.  Patient will return to 50%  functional activities without increased symptoms or restriction. [] Progressing: [] Met: [] Not Met: [] Adjusted  5. To be able to walk more and not have my knee hurt. (patient specific functional goal)    [] Progressing: [] Met: [] Not Met: [] Adjusted           Treatment/Activity Tolerance:  [x] Patient tolerated treatment well [] Patient limited by fatique  [] Patient limited by pain  [] Patient limited by other medical complications  [] Other:     Overall Progression Towards Functional goals/ Treatment Progress Update:  [] Patient is progressing as expected towards functional goals listed. [] Progression is slowed due to complexities/Impairments listed. [] Progression has been slowed due to co-morbidities. [x] Plan just implemented, too soon to assess goals progression <30days   [] Goals require adjustment due to lack of progress  [] Patient is not progressing as expected and requires additional follow up with physician  [] Other    Prognosis for POC: [x] Good [] Fair  [] Poor    Patient requires continued skilled intervention: [x] Yes  [] No        PLAN:   [] Continue per plan of care [] Alter current plan (see comments)  [x] Plan of care initiated [] Hold pending MD visit [] Discharge  Gradually progress aquatic exercise as tolerated to increase   ROM, strength, and endurance to improve ADL's and decrease pain. ADD: increase gt,   box step, squats. , sit to stand, step downs, leg press, noodle hang/ bike, as tolerated. Electronically signed by: Brenden Anderson,     Note: If patient does not return for scheduled/recommended follow up visits, this note will serve as a discharge from care along with the most recent update on progress.

## 2022-11-29 ENCOUNTER — APPOINTMENT (OUTPATIENT)
Dept: PHYSICAL THERAPY | Age: 77
End: 2022-11-29
Payer: MEDICARE

## 2022-12-01 ENCOUNTER — APPOINTMENT (OUTPATIENT)
Dept: PHYSICAL THERAPY | Age: 77
End: 2022-12-01
Payer: MEDICARE

## 2022-12-06 ENCOUNTER — HOSPITAL ENCOUNTER (OUTPATIENT)
Dept: PHYSICAL THERAPY | Age: 77
Setting detail: THERAPIES SERIES
Discharge: HOME | End: 2022-12-06
Payer: MEDICARE

## 2022-12-06 ENCOUNTER — APPOINTMENT (OUTPATIENT)
Dept: PHYSICAL THERAPY | Age: 77
End: 2022-12-06
Payer: MEDICARE

## 2022-12-06 PROCEDURE — 97150 GROUP THERAPEUTIC PROCEDURES: CPT

## 2022-12-06 PROCEDURE — 97113 AQUATIC THERAPY/EXERCISES: CPT

## 2022-12-06 NOTE — FLOWSHEET NOTE
[facility] - Outpatient Rehabilitation and Therapy, [facility]  [address] [facility] [address] [address] [facility]  Phone: [phone]   Fax:     [phone] [address] [place] [address], [facility]  [address] [facility]  Phone: [phone]   Fax:     [phone]      Physical Therapy Daily/Aquatic Flow Sheet   Date:  2022    Patient Name:  Jaja Rodríguez    :  1945  MRN: [id number]    Restrictions/Precautions:    Pertinent Medical History:     Medical/Treatment Diagnosis Information:   Medical Diagnosis: Pain in right knee [M25.561]  Meralgia paresthetica, right lower limb [G57.11]  Radiculopathy, lumbar region [M54.16]       Insurance/Certification information:   [address] [place] [address]  Physician Information:  ESTEBAN Charles *  Plan of care signed (Y/N): sent 10/28    Date of Patient follow up with Physician:      Progress Report: []  Yes  [x]  No     Date Range for reporting period:  Beginnin2022  Ending:      Progress report due (10 Rx/or 30 days whichever is less): ;     Recertification due (POC duration/ or 90 days whichever is less): 22     Visit # POC/Insurance Allowable Auth Needed     AIM  9 visits  10-28-22 to 22 []Yes   []No     Latex Allergy:  [x]NO      []YES  Preferred Language for Healthcare:   [x]English       []Other:     Relevant Medical History:Additional Pertinent Hx: Arthritis     CAD (coronary artery disease)     Colon cancer ([facility] [address]  colon 2019 dec   High blood pressure     Hyperlipidemia      Atrial fibrillation (HCC)     CPAP (continuous positive airway pressure) dependence     Right and left TKR Gout     Sleep apnea   LB Sx  Functional Scale/Score: FOTO = 36     Filled out for his back - also a recent issue    History of Present condition -  recent history of LBP and right leg gain.   Had DRAGNA and nerve root block 4 days ago and feeling about 75% better. Right knee has always been acting up and pt wanted to come to PT to see if he could get to feeling better. Pain:   R TKR  8/10                  after Rx 5/10               LBP     2/10                                1/10        SUBJECTIVE: Back feels about 75% better  right knee hurts - Hurts if legs are hanging down, better if legs are on the ground. Now can walk without the cane  11-8-22 reports DRAGAN helpful, but wearing off. Is member at [facility] [employer]. Reports no R LE pain, just R knee. 11-10-22 reports tolerated first aquatic ex w/o increased S/S. Going on cruise in several weeks. To pool with st cane. 11-15-22 tolerated last aquatic ex well. Reports has own pool at home. 11-17-22  Chief c/o R knee. Reports aquatic ex easier to do than land. 11-22-22  no new c/o, going on cruise next week. 12-6-22 reports increased pain from trip/ cruise. Reports he fell off sitting on suitcase and aggravated knee getting in/ out pool using ladder.  Saw pain MD yesterday who recommended f/u with knee MD.                                                                 10/28 left            right  Knee ext 4/5 4/5   Knee Flex 4+/5 4+/5     Knee EXT (quad) -13 -15   Knee Flex (HS) 114 110       Land-based Visits Exercises/Activities: NO LAND RX TODAY  Therapeutic Ex (31946)  Min: Resistance/Repetitions Notes   Hamstring stretch add              Therapeutic Activity (25569) Min:     Pool tour, policies and procedures Pt verbalizes understanding    Use of cane for safety and stabiltiy          NMR re-education (81003) Min:  10     Add sets 5 sec x 10    Glut sets supine and seated Supine 5 sec x 10    Quad sets Supine 5 sec x 10         Manual Intervention (47624)  Min:                    Modalities  Min:  Recommended alternating ice/heat for pain control             Aquatic Visits Exercises/Activities:  Aquatic Abbreviation Key  B= Belt DB= Dumbells T= Theratube   G=Gloves N= Noodles W= Weights P= Paddles WW=Water Walker K= Kickboard        Transfers:   Steps ind       % Immersion: 75%            Ambulation:  Laps   ind Exercises UE:  B w/ L-S stab and balance    Forwards 4 Shoulder Shrugs     Lateral 1 Shoulder circles     Marching  1 Scapular Retraction      Retro   Rolling  10    Cariocas  Push Downs 10   Multifidus walkouts w/paddle  IR/ER 10     Punching    Stretching: B   30 sec  Rowing 10   Gastroc  2 Elbow Flex/Ext 10   Shldr Flex/Ext  10   Knee flex stretch   Shldr Abd/Add 10   Piriformis   Horiz Abd/Add  10   Hip flexor    Arm Circles  10   SKTC   PNF Diagonals 10   DKTC  UE oscillations f/b, s/s    ITB   Wall Push-ups    Quad  Figure 8's 10   Mid back   Buoy pull/push downs    UT  Tband rows    Rhom  Tband lats    Post Shoulder  Lumbar Rot w/ paddles    Pec   Small ball pull downs                   diagonals             Cervical:       AROM Flex       AROM Extension     LEs exercises:  B AROM Side Bending    Marching  10 AROM Rotation    Heel Raises  10 Chin tucks    Toe Raises  10 Chin nods     Squats  10      Hamstring Curls  10      Hip Flexion  10 Balance:      Hip Abduction 10 SLS    Hip Circles 10 Tandem stance TA set 10 NBOS eyes open Glut Set 10 NBOS eyes closed Hip Extension  Hand to Opposite Knee Hip Adduction    Box Step     Hip IR   Noodle Stance     Hip ER  Stop/Go Gait    Fig 8's  Switch Gait                Seated: B Functional:    Ankle Pumps Ankle circles Step up lateral    Knee flex & ext Step down    Hip Abd & Adduction Time squats    Bicycle  Crate Lifts    Add Set with ball Lunges forward    LX stab with med ball throws  Lunges lateral    Ankle INV  Lunges retro    Ankle EV  Lower ab curl with noodle      Upper ab curl with ball      Med ball straight lifts      Med ball diagonal lifts      Hydrorider          Noodle:      Leg Press  Deep Water:     hang at wall 2 min      relief  Jog    Noodle hang deep water  Jumping Jacks    Noodle Bicycle  Heel to toe      Hand to opposite knee      Cross country skier      Rocking Horse      Other Therapeutic Activities:  Pt was educated on PT POC, Diagnosis, Prognosis, pathomechanics as well as frequency and duration of scheduling future physical therapy appointments. Time was also taken on this day to answer all patient questions and participation in PT. Reviewed appointment policy in detail with patient and patient verbalized understanding. Home Exercise Program:  Patient was instructed in the following for HEP:     . Patient verbalized/demonstrated understanding and was issued written handout. Charges: Therapeutic Exercise and NMR EXR  [x] (18632) Provided verbal/tactile cueing for activities related to strengthening, flexibility, endurance, ROM for improvements in LE, proximal hip, and core control with self care, mobility, lifting, ambulation. [x] (60573) Provided verbal/tactile cueing for activities related to improving balance, coordination, kinesthetic sense, posture, motor skill, proprioception  to assist with LE, proximal hip, and core control in self care, mobility, lifting, ambulation and eccentric single leg control. NMR and Therapeutic Activities:    [x] (30722 or 21846) Provided verbal/tactile cueing for activities related to improving balance, coordination, kinesthetic sense, posture, motor skill, proprioception and motor activation to allow for proper function of core, proximal hip and LE with self care and ADLs and functional mobility.    [x] (39667) Gait Re-education- Provided training and instruction to the patient for proper LE, core and proximal hip recruitment and positioning and eccentric body weight control with ambulation re-education including up and down stairs     Home Exercise Program:    [x] (63803) Reviewed/Progressed HEP activities related to strengthening, flexibility, endurance, ROM of core, proximal hip and LE for functional self-care, mobility, lifting and ambulation/stair navigation   [] (82401)Reviewed/Progressed HEP activities related to improving balance, coordination, kinesthetic sense, posture, motor skill, proprioception of core, proximal hip and LE for self care, mobility, lifting, and ambulation/stair navigation      Manual Treatments:  PROM / STM / Oscillations-Mobs:  G-I, II, III, IV (PA's, Inf., Post.)  [x] (36377) Provided manual therapy to mobilize LE, proximal hip and/or LS spine soft tissue/joints for the purpose of modulating pain, promoting relaxation,  increasing ROM, reducing/eliminating soft tissue swelling/inflammation/restriction, improving soft tissue extensibility and allowing for proper ROM for normal function with self care, mobility, lifting and ambulation. Individual Aquatic Therapy:  [x] (36326) Provided verbal and tactile cueing for strengthening, flexibility, ROM using the therapeutic properties of water (buoyancy, resistance) for improvements in core control, mobility and ambulation     Aquatic Group:  [x] (89627) Provided intermittent verbal and tactile cueing for strengthening, endurance, flexibility and ROM for 2-4 individuals that do not require one-on one patient contact by the therapist       Land Timed Code Treatment Minutes: 0   Land Total Treatment Minutes: 0     Individual Aquatic Minutes: 15   Aquatic Group Minutes: 30     [] EVAL (LOW) 99116 (typically 20 minutes face-to-face)  [] EVAL (MOD) 61801 (typically 30 minutes face-to-face)  [] EVAL (HIGH) 03219 (typically 45 minutes face-to-face)  [] RE-EVAL     [] FU(77435) x     [] Dry needle 1 or 2 Muscles (39381)  [] NMR (37691) x     [] Dry needle 3+ Muscles (73157)  [] Manual (45640) x     [] Ultrasound (97949) x  [] TA (99235) x     [] Mech Traction (22871)  [] ES(attended) (03820)     [] ES (un) (21015):   [] Vasopump (36352) [] Ionto (90003)   [x] Aquatic Ind (14000) x  [x] Aquatic Group (34952) x   [] Other:       ASSESSMENT: Pt with long history of knee pain, prior TKR lida.   With recent exacerbation of symptoms as well as lumbar and radicular pain. Back is much better post shots, right knee an issue that pt wants better before he leaves for vacation next week. 22 Pt would benefit from skilled aquatic therapy to  pain, increase ROM, flexibility, balance, endurance, gait, to improve function and ADL's. Patient required individual attention x 40 min for orientation to aquatic therapy, instruction and cues for correct exercise technique and count reps. 11-10-22 tolerated new ex well. Cues to avoid excessive movements in between ex.   11-15-22 gradually progressing with aquatic ex.   22 Tolerating aquatic ex well with decreased pain afterwards. Pt. Instructed in easier way to go up/ down steps to protect R knee. Pt prefers to go down with L and up with R first.   22 discussed recommendations/ precautions with doing aquatic ex in pool on cruise ship.   22  flare up after cruise, decreased pain after above ex today. GOALS:  Patient stated goal: for his knee to not hurt  [] Progressing: [] Met: [] Not Met: [] Adjusted     Therapist goals for Patient:   Short Term Goals: To be achieved in: 2 weeks  1. Independent in HEP and progression per patient tolerance, in order to prevent re-injury. [] Progressing: [] Met: [] Not Met: [] Adjusted  2. Patient will have a decrease in pain to facilitate improvement in movement, function, and ADLs as indicated by Functional Deficits. [] Progressing: [] Met: [] Not Met: [] Adjusted     Long Term Goals: To be achieved in: 8 weeks  1. Increase FOTO functional outcome score from 36 to 50 to assist with reaching prior level of function. [] Progressing: [] Met: [] Not Met: [] Adjusted  2. Patient will demonstrate increased AROM to 5-115 to allow for proper joint functioning as indicated by patients Functional Deficits. [] Progressing: [] Met: [] Not Met: [] Adjusted  3.  Patient will demonstrate an increase in Strength to good proximal hip strength and control, within 5lb HHD in LE to allow for proper functional mobility as indicated by patients Functional Deficits. [] Progressing: [] Met: [] Not Met: [] Adjusted  4. Patient will return to 50%  functional activities without increased symptoms or restriction. [] Progressing: [] Met: [] Not Met: [] Adjusted  5. To be able to walk more and not have my knee hurt. (patient specific functional goal)    [] Progressing: [] Met: [] Not Met: [] Adjusted           Treatment/Activity Tolerance:  [x] Patient tolerated treatment well [] Patient limited by fatique  [] Patient limited by pain  [] Patient limited by other medical complications  [] Other:     Overall Progression Towards Functional goals/ Treatment Progress Update:  [] Patient is progressing as expected towards functional goals listed. [] Progression is slowed due to complexities/Impairments listed. [] Progression has been slowed due to co-morbidities. [x] Plan just implemented, too soon to assess goals progression <30days   [] Goals require adjustment due to lack of progress  [] Patient is not progressing as expected and requires additional follow up with physician  [] Other    Prognosis for POC: [x] Good [] Fair  [] Poor    Patient requires continued skilled intervention: [x] Yes  [] No        PLAN:   [] Continue per plan of care [] Alter current plan (see comments)  [x] Plan of care initiated [] Hold pending MD visit [] Discharge  Gradually progress aquatic exercise as tolerated to increase   ROM, strength, and endurance to improve ADL's and decrease pain. ADD: increase gt,   box step, squats. , sit to stand, step downs, leg press, noodle hang/ bike, as tolerated. Electronically signed by: Berny Jeter,     Note: If patient does not return for scheduled/recommended follow up visits, this note will serve as a discharge from care along with the most recent update on progress.

## 2022-12-08 ENCOUNTER — HOSPITAL ENCOUNTER (OUTPATIENT)
Dept: PHYSICAL THERAPY | Age: 77
Setting detail: THERAPIES SERIES
Discharge: HOME | End: 2022-12-08
Payer: MEDICARE

## 2022-12-08 PROCEDURE — 97150 GROUP THERAPEUTIC PROCEDURES: CPT

## 2022-12-08 PROCEDURE — 97113 AQUATIC THERAPY/EXERCISES: CPT

## 2022-12-08 NOTE — FLOWSHEET NOTE
[facility] - Outpatient Rehabilitation and Therapy, [facility]  [address] [facility] [address] [facility]  Phone: [phone]   Fax:     [phone] [address] and [address] [place] [address], [facility]  [address] [facility]  Phone: [phone]   Fax:     [phone]      Physical Therapy Daily/Aquatic Flow Sheet   Date:  2022    Patient Name:  Minta Najjar    :  1945  MRN: [id number]    Restrictions/Precautions:    Pertinent Medical History:     Medical/Treatment Diagnosis Information:   Medical Diagnosis: Pain in right knee [M25.561]  Meralgia paresthetica, right lower limb [G57.11]  Radiculopathy, lumbar region [M54.16]       Insurance/Certification information:   Layla Ambriz  Physician Information:  ESTEBAN Root *  Plan of care signed (Y/N): sent 10/28    Date of Patient follow up with Physician:      Progress Report: []  Yes  [x]  No     Date Range for reporting period:  Beginnin2022  Ending:      Progress report due (10 Rx/or 30 days whichever is less): ;53     Recertification due (POC duration/ or 90 days whichever is less): 22     Visit # POC/Insurance Allowable Auth Needed     AIM  9 visits  10-28-22 to 22 []Yes   []No     Latex Allergy:  [x]NO      []YES  Preferred Language for Healthcare:   [x]English       []Other:     Relevant Medical History:Additional Pertinent Hx: Arthritis     CAD (coronary artery disease)     Colon cancer ([address]  colon 2019 dec   High blood pressure     Hyperlipidemia      Atrial fibrillation (HCC)     CPAP (continuous positive airway pressure) dependence     Right and left TKR Gout     Sleep apnea   LB Sx  Functional Scale/Score: FOTO = 36     Filled out for his back - also a recent issue    History of Present condition -  recent history of LBP and right leg gain.   Had DRAGAN and nerve root block 4 days ago and feeling about 75% better. Right knee has always been acting up and pt wanted to come to PT to see if he could get to feeling better. Pain:   R TKR  7/10                  after Rx 5/10               LBP     1/10                                0/10        SUBJECTIVE: Back feels about 75% better  right knee hurts - Hurts if legs are hanging down, better if legs are on the ground. Now can walk without the cane  11-8-22 reports DRAGAN helpful, but wearing off. Is member at [facility]. Reports no R LE pain, just R knee. 11-10-22 reports tolerated first aquatic ex w/o increased S/S. Going on cruise in several weeks. To pool with st cane. 11-15-22 tolerated last aquatic ex well. Reports has own pool at home. 11-17-22  Chief c/o R knee. Reports aquatic ex easier to do than land. 11-22-22  no new c/o, going on cruise next week. 12-6-22 reports increased pain from trip/ cruise. Reports he fell off sitting on suitcase and aggravated knee getting in/ out pool using ladder. Saw pain MD yesterday who recommended f/u with knee MD.   12-8-22 Doing a little better today. Pt to pool  40 min late.                                                                  10/28 left            right  Knee ext 4/5 4/5   Knee Flex 4+/5 4+/5     Knee EXT (quad) -13 -15   Knee Flex (HS) 114 110       Land-based Visits Exercises/Activities: NO LAND RX TODAY  Therapeutic Ex (65914)  Min: Resistance/Repetitions Notes   Hamstring stretch add              Therapeutic Activity (01029) Min:     Pool tour, policies and procedures Pt verbalizes understanding    Use of cane for safety and stabiltiy          NMR re-education (80482) Min:  10     Add sets 5 sec x 10    Glut sets supine and seated Supine 5 sec x 10    Quad sets Supine 5 sec x 10         Manual Intervention (57259)  Min:                    Modalities  Min:  Recommended alternating ice/heat for pain control             Aquatic Visits Exercises/Activities:  Aquatic Abbreviation Key  B= Belt DB= Dumbells T= Theratube   G=Gloves N= Noodles W= Weights   P= Paddles WW=Water Walker K= Kickboard        Transfers:   Steps ind       % Immersion: 75%            Ambulation:  Laps   ind Exercises UE:  B w/ L-S stab and balance    Forwards 4 Shoulder Shrugs     Lateral 1 Shoulder circles     Marching  1 Scapular Retraction      Retro   Rolling  10    [place]ariocas  Push Downs 10   Multifidus walkouts w/paddle  IR/ER 10     Punching    Stretching: B   30 sec  Rowing 10   Gastroc  2 Elbow Flex/Ext 10   Shldr Flex/Ext  10   Knee flex stretch   Shldr Abd/Add 10   Piriformis   Horiz Abd/Add  10   Hip flexor    Arm Circles  10   SKTC   PNF Diagonals 10   DKTC  UE oscillations f/b, s/s    ITB   Wall Push-ups    Quad  Figure 8's 10   Mid back   Buoy pull/push downs    UT  Tband rows    Rhom  Tband lats    Post Shoulder  Lumbar Rot w/ paddles    Pec   Small ball pull downs                   diagonals             Cervical:       AROM Flex       AROM Extension     LEs exercises:  B AROM Side Bending    Marching  10 AROM Rotation    Heel Raises  10 Chin tucks    Toe Raises  10 Chin nods     Squats  10      Hamstring Curls  10      Hip Flexion  10 Balance:      Hip Abduction 10 SLS    Hip Circles 10 Tandem stance 30 sec R/L   TA set 10 NBOS eyes open    Glut Set 10 NBOS eyes closed 30 sec    Hip Extension  Hand to Opposite Knee 10   Hip Adduction    Box Step     Hip IR   Noodle Stance     Hip ER  Stop/Go Gait    Fig 8's  Switch Gait                Seated: B Functional:    Ankle Pumps 10 Ankle circles 10 Step up lateral    Knee flex & ext 10 Step down    Hip Abd & Adduction 10 [place] squats    Bicycle  10 Crate Lifts    Add Set with ball 10 Lunges forward    LX stab with med ball throws  Lunges lateral    Ankle INV  Lunges retro    Ankle EV  Lower ab curl with noodle      Upper ab curl with ball      Med ball straight lifts      Med ball diagonal lifts      Hydrorider          Noodle:      Leg Press  Deep Water:    Jog Noodle hang deep water  Jumping Jacks    Noodle Bicycle  Heel to toe      Hand to opposite knee      Cross country skier      Rocking Horse      Other Therapeutic Activities:  Pt was educated on PT POC, Diagnosis, Prognosis, pathomechanics as well as frequency and duration of scheduling future physical therapy appointments. Time was also taken on this day to answer all patient questions and participation in PT. Reviewed appointment policy in detail with patient and patient verbalized understanding. Home Exercise Program:  Patient was instructed in the following for HEP:     . Patient verbalized/demonstrated understanding and was issued written handout. Charges: Therapeutic Exercise and NMR EXR  [x] (96678) Provided verbal/tactile cueing for activities related to strengthening, flexibility, endurance, ROM for improvements in LE, proximal hip, and core control with self care, mobility, lifting, ambulation. [x] (09219) Provided verbal/tactile cueing for activities related to improving balance, coordination, kinesthetic sense, posture, motor skill, proprioception  to assist with LE, proximal hip, and core control in self care, mobility, lifting, ambulation and eccentric single leg control. NMR and Therapeutic Activities:    [x] (60656 or 55837) Provided verbal/tactile cueing for activities related to improving balance, coordination, kinesthetic sense, posture, motor skill, proprioception and motor activation to allow for proper function of core, proximal hip and LE with self care and ADLs and functional mobility.    [x] (65666) Gait Re-education- Provided training and instruction to the patient for proper LE, core and proximal hip recruitment and positioning and eccentric body weight control with ambulation re-education including up and down stairs     Home Exercise Program:    [x] (10299) Reviewed/Progressed HEP activities related to strengthening, flexibility, endurance, ROM of core, proximal hip and LE for functional self-care, mobility, lifting and ambulation/stair navigation   [] (72965)Reviewed/Progressed HEP activities related to improving balance, coordination, kinesthetic sense, posture, motor skill, proprioception of core, proximal hip and LE for self care, mobility, lifting, and ambulation/stair navigation      Manual Treatments:  PROM / STM / Oscillations-Mobs:  G-I, II, III, IV (PA's, Inf., Post.)  [x] (93447) Provided manual therapy to mobilize LE, proximal hip and/or LS spine soft tissue/joints for the purpose of modulating pain, promoting relaxation,  increasing ROM, reducing/eliminating soft tissue swelling/inflammation/restriction, improving soft tissue extensibility and allowing for proper ROM for normal function with self care, mobility, lifting and ambulation.      Individual Aquatic Therapy:  [x] (39183) Provided verbal and tactile cueing for strengthening, flexibility, ROM using the therapeutic properties of water (buoyancy, resistance) for improvements in core control, mobility and ambulation     Aquatic Group:  [x] (36663) Provided intermittent verbal and tactile cueing for strengthening, endurance, flexibility and ROM for 2-4 individuals that do not require one-on one patient contact by the therapist       Land Timed Code Treatment Minutes: 0   Land Total Treatment Minutes: 0     Individual Aquatic Minutes: 15   Aquatic Group Minutes: 30     [] EVAL (LOW) 69939 (typically 20 minutes face-to-face)  [] EVAL (MOD) 51976 (typically 30 minutes face-to-face)  [] EVAL (HIGH) 70465 (typically 45 minutes face-to-face)  [] RE-EVAL     [] CN(24142) x     [] Dry needle 1 or 2 Muscles (38567)  [] NMR (53770) x     [] Dry needle 3+ Muscles (51955)  [] Manual (55175) x     [] Ultrasound (18768) x  [] TA (57809) x     [] Mech Traction (75996)  [] ES(attended) (92015)     [] ES (un) (58616):   [] Vasopump (87684) [] Ionto (96589)   [x] Aquatic Ind (65308) x  [x] Aquatic Group (58630) x   [] Other: ASSESSMENT: Pt with long history of knee pain, prior TKR lida. With recent exacerbation of symptoms as well as lumbar and radicular pain. Back is much better post shots, right knee an issue that pt wants better before he leaves for vacation next week. 22 Pt would benefit from skilled aquatic therapy to  pain, increase ROM, flexibility, balance, endurance, gait, to improve function and ADL's. Patient required individual attention x 40 min for orientation to aquatic therapy, instruction and cues for correct exercise technique and count reps. 11-10-22 tolerated new ex well. Cues to avoid excessive movements in between ex.   11-15-22 gradually progressing with aquatic ex.   22 Tolerating aquatic ex well with decreased pain afterwards. Pt. Instructed in easier way to go up/ down steps to protect R knee. Pt prefers to go down with L and up with R first.   22 discussed recommendations/ precautions with doing aquatic ex in pool on cruise ship.   22  flare up after cruise, decreased pain after above ex today. 22 tolerated ex better today. GOALS:  Patient stated goal: for his knee to not hurt  [] Progressing: [] Met: [] Not Met: [] Adjusted     Therapist goals for Patient:   Short Term Goals: To be achieved in: 2 weeks  1. Independent in HEP and progression per patient tolerance, in order to prevent re-injury. [] Progressing: [] Met: [] Not Met: [] Adjusted  2. Patient will have a decrease in pain to facilitate improvement in movement, function, and ADLs as indicated by Functional Deficits. [] Progressing: [] Met: [] Not Met: [] Adjusted     Long Term Goals: To be achieved in: 8 weeks  1. Increase FOTO functional outcome score from 36 to 50 to assist with reaching prior level of function. [] Progressing: [] Met: [] Not Met: [] Adjusted  2.  Patient will demonstrate increased AROM to 5-115 to allow for proper joint functioning as indicated by patients Functional Deficits. [] Progressing: [] Met: [] Not Met: [] Adjusted  3. Patient will demonstrate an increase in Strength to good proximal hip strength and control, within 5lb HHD in LE to allow for proper functional mobility as indicated by patients Functional Deficits. [] Progressing: [] Met: [] Not Met: [] Adjusted  4. Patient will return to 50%  functional activities without increased symptoms or restriction. [] Progressing: [] Met: [] Not Met: [] Adjusted  5. To be able to walk more and not have my knee hurt. (patient specific functional goal)    [] Progressing: [] Met: [] Not Met: [] Adjusted           Treatment/Activity Tolerance:  [x] Patient tolerated treatment well [] Patient limited by fatique  [] Patient limited by pain  [] Patient limited by other medical complications  [] Other:     Overall Progression Towards Functional goals/ Treatment Progress Update:  [] Patient is progressing as expected towards functional goals listed. [] Progression is slowed due to complexities/Impairments listed. [] Progression has been slowed due to co-morbidities. [x] Plan just implemented, too soon to assess goals progression <30days   [] Goals require adjustment due to lack of progress  [] Patient is not progressing as expected and requires additional follow up with physician  [] Other    Prognosis for POC: [x] Good [] Fair  [] Poor    Patient requires continued skilled intervention: [x] Yes  [] No        PLAN:   [] Continue per plan of care [] Alter current plan (see comments)  [x] Plan of care initiated [] Hold pending MD visit [] Discharge  Gradually progress aquatic exercise as tolerated to increase   ROM, strength, and endurance to improve ADL's and decrease pain. ADD: increase gt,   box step, squats. , sit to stand, step downs, leg press, noodle hang/ bike, as tolerated.       Electronically signed by: Precious Rivas,     Note: If patient does not return for scheduled/recommended follow up visits, this note will serve as a discharge from care along with the most recent update on progress.

## 2022-12-13 ENCOUNTER — HOSPITAL ENCOUNTER (OUTPATIENT)
Dept: PHYSICAL THERAPY | Age: 77
Setting detail: THERAPIES SERIES
Discharge: HOME | End: 2022-12-13
Payer: MEDICARE

## 2022-12-13 PROCEDURE — 97110 THERAPEUTIC EXERCISES: CPT

## 2022-12-13 PROCEDURE — 97112 NEUROMUSCULAR REEDUCATION: CPT

## 2022-12-13 PROCEDURE — 97150 GROUP THERAPEUTIC PROCEDURES: CPT

## 2022-12-13 PROCEDURE — 97113 AQUATIC THERAPY/EXERCISES: CPT

## 2022-12-13 PROCEDURE — 97530 THERAPEUTIC ACTIVITIES: CPT

## 2023-02-28 ENCOUNTER — HOSPITAL ENCOUNTER (OUTPATIENT)
Dept: CT IMAGING | Age: 78
Discharge: HOME | End: 2023-02-28
Payer: MEDICARE

## 2023-02-28 DIAGNOSIS — C18.2 CANCER OF ASCENDING COLON (HCC): ICD-10-CM

## 2023-02-28 LAB
GFR SERPL CREATININE-BSD FRML MDRD: >60 ML/MIN/{1.73_M2}
PERFORMED ON: NORMAL
POC CREATININE: 1 MG/DL (ref 0.8–1.3)
POC SAMPLE TYPE: NORMAL

## 2023-02-28 PROCEDURE — 6360000004 HC RX CONTRAST MEDICATION: Performed by: INTERNAL MEDICINE

## 2023-02-28 PROCEDURE — 74177 CT ABD & PELVIS W/CONTRAST: CPT

## 2023-02-28 PROCEDURE — 82565 ASSAY OF CREATININE: CPT

## 2023-02-28 RX ADMIN — IOPAMIDOL 75 ML: 755 INJECTION, SOLUTION INTRAVENOUS at 09:59

## 2023-02-28 RX ADMIN — IOPAMIDOL 50 ML: 612 INJECTION, SOLUTION INTRAVENOUS at 09:04

## 2023-03-18 ENCOUNTER — HOSPITAL ENCOUNTER (EMERGENCY)
Age: 78
Discharge: HOME | End: 2023-03-18
Payer: MEDICARE

## 2023-03-18 ENCOUNTER — APPOINTMENT (OUTPATIENT)
Dept: GENERAL RADIOLOGY | Age: 78
End: 2023-03-18
Payer: MEDICARE

## 2023-03-18 ENCOUNTER — APPOINTMENT (OUTPATIENT)
Dept: CT IMAGING | Age: 78
End: 2023-03-18
Payer: MEDICARE

## 2023-03-18 VITALS
RESPIRATION RATE: 20 BRPM | HEART RATE: 88 BPM | SYSTOLIC BLOOD PRESSURE: 139 MMHG | WEIGHT: 267.2 LBS | DIASTOLIC BLOOD PRESSURE: 107 MMHG | HEIGHT: 69 IN | OXYGEN SATURATION: 96 % | BODY MASS INDEX: 39.58 KG/M2 | TEMPERATURE: 97.2 F

## 2023-03-18 DIAGNOSIS — J18.0 BRONCHOPNEUMONIA: Primary | ICD-10-CM

## 2023-03-18 LAB
ALBUMIN SERPL-MCNC: 3.3 G/DL (ref 3.4–5)
ALBUMIN/GLOB SERPL: 1 {RATIO} (ref 1.1–2.2)
ALP SERPL-CCNC: 85 U/L (ref 40–129)
ALT SERPL-CCNC: 17 U/L (ref 10–40)
ANION GAP SERPL CALCULATED.3IONS-SCNC: 13 MMOL/L (ref 3–16)
AST SERPL-CCNC: 17 U/L (ref 15–37)
BASE EXCESS BLDV CALC-SCNC: 1.8 MMOL/L
BASOPHILS # BLD: 0.1 K/UL (ref 0–0.2)
BASOPHILS NFR BLD: 0.6 %
BILIRUB SERPL-MCNC: 0.5 MG/DL (ref 0–1)
BUN SERPL-MCNC: 14 MG/DL (ref 7–20)
CALCIUM SERPL-MCNC: 8.9 MG/DL (ref 8.3–10.6)
CHLORIDE SERPL-SCNC: 106 MMOL/L (ref 99–110)
CO2 BLDV-SCNC: 27 MMOL/L
CO2 SERPL-SCNC: 21 MMOL/L (ref 21–32)
COHGB MFR BLDV: 1.3 %
CREAT SERPL-MCNC: 0.6 MG/DL (ref 0.8–1.3)
DEPRECATED RDW RBC AUTO: 13.9 % (ref 12.4–15.4)
EOSINOPHIL # BLD: 0 K/UL (ref 0–0.6)
EOSINOPHIL NFR BLD: 0.5 %
FLUAV RNA UPPER RESP QL NAA+PROBE: NEGATIVE
FLUBV AG NPH QL: NEGATIVE
GFR SERPLBLD CREATININE-BSD FMLA CKD-EPI: >60 ML/MIN/{1.73_M2}
GLUCOSE SERPL-MCNC: 92 MG/DL (ref 70–99)
HCO3 BLDV-SCNC: 26 MMOL/L (ref 23–29)
HCT VFR BLD AUTO: 47.9 % (ref 40.5–52.5)
HGB BLD-MCNC: 15.8 G/DL (ref 13.5–17.5)
LYMPHOCYTES # BLD: 2.1 K/UL (ref 1–5.1)
LYMPHOCYTES NFR BLD: 22.9 %
MCH RBC QN AUTO: 30.4 PG (ref 26–34)
MCHC RBC AUTO-ENTMCNC: 33 G/DL (ref 31–36)
MCV RBC AUTO: 92.1 FL (ref 80–100)
METHGB MFR BLDV: 0.2 %
MONOCYTES # BLD: 0.6 K/UL (ref 0–1.3)
MONOCYTES NFR BLD: 6.7 %
NEUTROPHILS # BLD: 6.4 K/UL (ref 1.7–7.7)
NEUTROPHILS NFR BLD: 69.3 %
NT-PROBNP SERPL-MCNC: 180 PG/ML (ref 0–449)
O2 THERAPY: ABNORMAL
PCO2 BLDV: 39.3 MMHG (ref 40–50)
PH BLDV: 7.43 [PH] (ref 7.35–7.45)
PLATELET # BLD AUTO: 247 K/UL (ref 135–450)
PMV BLD AUTO: 7.5 FL (ref 5–10.5)
PO2 BLDV: 39 MMHG
POTASSIUM SERPL-SCNC: 3.6 MMOL/L (ref 3.5–5.1)
PROT SERPL-MCNC: 6.7 G/DL (ref 6.4–8.2)
RBC # BLD AUTO: 5.19 M/UL (ref 4.2–5.9)
SAO2 % BLDV: 75 %
SARS-COV-2 RDRP RESP QL NAA+PROBE: NOT DETECTED
SODIUM SERPL-SCNC: 140 MMOL/L (ref 136–145)
TROPONIN T SERPL-MCNC: <0.01 NG/ML
WBC # BLD AUTO: 9.3 K/UL (ref 4–11)

## 2023-03-18 PROCEDURE — 87804 INFLUENZA ASSAY W/OPTIC: CPT

## 2023-03-18 PROCEDURE — 99285 EMERGENCY DEPT VISIT HI MDM: CPT

## 2023-03-18 PROCEDURE — 94760 N-INVAS EAR/PLS OXIMETRY 1: CPT

## 2023-03-18 PROCEDURE — 93005 ELECTROCARDIOGRAM TRACING: CPT | Performed by: NURSE PRACTITIONER

## 2023-03-18 PROCEDURE — 84484 ASSAY OF TROPONIN QUANT: CPT

## 2023-03-18 PROCEDURE — 71046 X-RAY EXAM CHEST 2 VIEWS: CPT

## 2023-03-18 PROCEDURE — 6370000000 HC RX 637 (ALT 250 FOR IP): Performed by: NURSE PRACTITIONER

## 2023-03-18 PROCEDURE — 82803 BLOOD GASES ANY COMBINATION: CPT

## 2023-03-18 PROCEDURE — 87635 SARS-COV-2 COVID-19 AMP PRB: CPT

## 2023-03-18 PROCEDURE — 71250 CT THORAX DX C-: CPT

## 2023-03-18 PROCEDURE — 83880 ASSAY OF NATRIURETIC PEPTIDE: CPT

## 2023-03-18 PROCEDURE — 94640 AIRWAY INHALATION TREATMENT: CPT

## 2023-03-18 PROCEDURE — 80053 COMPREHEN METABOLIC PANEL: CPT

## 2023-03-18 PROCEDURE — 6370000000 HC RX 637 (ALT 250 FOR IP)

## 2023-03-18 PROCEDURE — 85025 COMPLETE CBC W/AUTO DIFF WBC: CPT

## 2023-03-18 RX ORDER — DOXYCYCLINE HYCLATE 100 MG
100 TABLET ORAL ONCE
Status: COMPLETED | OUTPATIENT
Start: 2023-03-18 | End: 2023-03-18

## 2023-03-18 RX ORDER — DOXYCYCLINE HYCLATE 100 MG
100 TABLET ORAL 2 TIMES DAILY
Qty: 20 TABLET | Refills: 0 | Status: SHIPPED | OUTPATIENT
Start: 2023-03-18 | End: 2023-03-28

## 2023-03-18 RX ORDER — IPRATROPIUM BROMIDE AND ALBUTEROL SULFATE 2.5; .5 MG/3ML; MG/3ML
1 SOLUTION RESPIRATORY (INHALATION) ONCE
Status: COMPLETED | OUTPATIENT
Start: 2023-03-18 | End: 2023-03-18

## 2023-03-18 RX ORDER — PREDNISONE 20 MG/1
60 TABLET ORAL ONCE
Status: COMPLETED | OUTPATIENT
Start: 2023-03-18 | End: 2023-03-18

## 2023-03-18 RX ORDER — IPRATROPIUM BROMIDE AND ALBUTEROL SULFATE 2.5; .5 MG/3ML; MG/3ML
SOLUTION RESPIRATORY (INHALATION)
Status: COMPLETED
Start: 2023-03-18 | End: 2023-03-18

## 2023-03-18 RX ORDER — ALBUTEROL SULFATE 90 UG/1
2 AEROSOL, METERED RESPIRATORY (INHALATION) 4 TIMES DAILY PRN
Qty: 18 G | Refills: 0 | Status: SHIPPED | OUTPATIENT
Start: 2023-03-18

## 2023-03-18 RX ORDER — FAMOTIDINE 20 MG/1
20 TABLET, FILM COATED ORAL 2 TIMES DAILY
Qty: 60 TABLET | Refills: 0 | Status: SHIPPED | OUTPATIENT
Start: 2023-03-18

## 2023-03-18 RX ORDER — PREDNISONE 20 MG/1
TABLET ORAL
Qty: 18 TABLET | Refills: 0 | Status: SHIPPED | OUTPATIENT
Start: 2023-03-18 | End: 2023-03-28

## 2023-03-18 RX ADMIN — IPRATROPIUM BROMIDE AND ALBUTEROL SULFATE 1 AMPULE: 2.5; .5 SOLUTION RESPIRATORY (INHALATION) at 18:18

## 2023-03-18 RX ADMIN — IPRATROPIUM BROMIDE AND ALBUTEROL SULFATE 1 AMPULE: 2.5; .5 SOLUTION RESPIRATORY (INHALATION) at 16:26

## 2023-03-18 RX ADMIN — PREDNISONE 60 MG: 20 TABLET ORAL at 18:18

## 2023-03-18 RX ADMIN — DOXYCYCLINE HYCLATE 100 MG: 100 TABLET, COATED ORAL at 20:00

## 2023-03-18 ASSESSMENT — PAIN - FUNCTIONAL ASSESSMENT
PAIN_FUNCTIONAL_ASSESSMENT: NONE - DENIES PAIN
PAIN_FUNCTIONAL_ASSESSMENT: 0-10

## 2023-03-18 ASSESSMENT — PAIN SCALES - GENERAL: PAINLEVEL_OUTOF10: 0

## 2023-03-18 NOTE — ED TRIAGE NOTES
Patient reports cough and general weakness for 10 days. Denies covid or flu exposure. Covid test at home last week negative.

## 2023-03-18 NOTE — ED PROVIDER NOTES
The Ekg interpreted by me shows  atrial fibrillation with a rate of 82  Axis is   Left axis deviation  QTc is   469 msec  Intervals and Durations are unremarkable. ST Segments: Very poor baseline but nonspecific changes appreciated. When compared to an EKG performed on December 2, 2019 sinus rhythm has been replaced with atrial fibrillation.      Jimmy Carrillo MD  03/18/23 8778

## 2023-03-18 NOTE — DISCHARGE INSTRUCTIONS
I am starting you on Pepcid which is going to protect your stomach and esophagus since I am prescribing steroids for you and you are already taking Eliquis. These 2 medications in combination can be harsh on your body and therefore you need to take the Pepcid for now. Do not take NSAIDs including but not limited to ibuprofen, Motrin, Advil, Aleve, naproxen while taking steroids    Incentive spirometer 10 times every 1 hour while awake    Keep moving and get out of your chair in order to prevent worsening symptoms.

## 2023-03-19 LAB
EKG DIAGNOSIS: NORMAL
EKG Q-T INTERVAL: 402 MS
EKG QRS DURATION: 94 MS
EKG QTC CALCULATION (BAZETT): 469 MS
EKG R AXIS: -24 DEGREES
EKG T AXIS: 36 DEGREES
EKG VENTRICULAR RATE: 82 BPM

## 2023-03-19 PROCEDURE — 93010 ELECTROCARDIOGRAM REPORT: CPT | Performed by: INTERNAL MEDICINE

## 2023-03-19 NOTE — ED NOTES
Pt ambulated in still and 02 sat 96 while walking. CNP aware. D/C: Order noted for d/c. Pt confirmed d/c paperwork  have correct name. Discharge and education instructions reviewed with patient. Teach-back successful. Pt verbalized understanding and signed d/c papers. Pt denied questions at this time. No acute distress noted. Patient instructed to follow-up as noted - return to emergency department if symptoms worsen. Patient verbalized understanding. Discharged per EDMD with discharge instructions. Pt discharged to private vehicle. Patient stable upon departure. Thanked patient for choosing [facility]) for care. Provider aware of patient pain at time of discharge.        Chad Meyers, RN  03/18/23 2026

## 2023-03-19 NOTE — ED NOTES
Ambulated pt in hallway. Pt tolerated well. Before ambulating O2 saturation was 97% RA and HR 67   After ambulating O2 saturation was 96% RA and HR 87. No other requests at this time. RN at bedside.           Lyubov Kyle  03/18/23 2002

## 2023-03-20 PROBLEM — F32.5 MAJOR DEPRESSION, SINGLE EPISODE, IN COMPLETE REMISSION (HCC): Status: RESOLVED | Noted: 2020-07-13 | Resolved: 2023-03-20

## 2023-03-20 PROBLEM — J18.9 PNEUMONIA DUE TO INFECTIOUS ORGANISM: Status: ACTIVE | Noted: 2023-03-20

## 2023-06-06 ENCOUNTER — HOSPITAL ENCOUNTER (OUTPATIENT)
Dept: PHYSICAL THERAPY | Age: 78
Setting detail: THERAPIES SERIES
Discharge: HOME | End: 2023-06-06
Payer: MEDICARE

## 2023-06-06 PROCEDURE — 97161 PT EVAL LOW COMPLEX 20 MIN: CPT

## 2023-06-06 PROCEDURE — 97530 THERAPEUTIC ACTIVITIES: CPT

## 2023-06-06 PROCEDURE — 97110 THERAPEUTIC EXERCISES: CPT

## 2023-06-06 NOTE — PLAN OF CARE
killing yourself? X                    Lifetime   YES NO   Have you ever done anything, started to do anything, or prepared to do anything to end your life? X             Past 3 months    X       History of Present Condition -  Had planned on right THR. Went in to Dr. Iza Neff for pre-op, potential surgery was planned for beginning of June. MD said the hips is not the problem it is your back. Bone scan was negative for right leg . Was given an injection in the right knee several months ago which helped a lot. Also said that the right hip was worn out. Getting injections in his spine for his leg and back. Also notes that his right hip is painful. 9/9/2022 -  There is multilevel anterior vertebral body osteophytosis. There is mild narrowing of the intervertebral disc space at L2/3. IMPRESSION:  No acute findings. Degenerative changes as described above    SUBJECTIVE: -  Pain 4/10  In the right knee and the back. Also notes his right hip is sore. Relevant Medical History:Additional Pertinent Hx: Arthritis     CAD (coronary artery disease)     Colon cancer ([facility] [address]  colon 2019 dec   High blood pressure     Hyperlipidemia      Atrial fibrillation (HCC)     CPAP  dependence  Gout     Sleep apnea  Functional Scale/Score: WOMAC  57     Pain Scale: 4/10  Easing factors: rest  Provocative factors:  being on his feet     Type: [x]Constant   []Intermittent  []Radiating []Localized []other:     Numbness/Tingling: none     Occupation/School:retired    Living Status/Prior Level of Function: Independent with ADLs and IADLs,  Use scooter when going junking. OBJECTIVE:   Palpation: no specific TTP noted    Functional Mobility/Transfers:  Mod I      Posture: bmi  40.4     Bandages/Dressings/Incisions: na    Gait: (include devices/WB status) antalgic gait    ROM LEFT RIGHT   HIP Flex wfl 105   HIP Abd  30   HIP Ext  To neutral   HIP IR  10   HIP ER  25   Knee ext  wfl   Knee Flex  wfl   Ankle PF  wfl   Ankle

## 2023-06-08 ENCOUNTER — HOSPITAL ENCOUNTER (OUTPATIENT)
Dept: PHYSICAL THERAPY | Age: 78
Setting detail: THERAPIES SERIES
Discharge: HOME | End: 2023-06-08
Payer: MEDICARE

## 2023-06-08 PROCEDURE — 97113 AQUATIC THERAPY/EXERCISES: CPT

## 2023-06-08 NOTE — FLOWSHEET NOTE
well [] Patient limited by fatigue  [x] Patient limited by pain  [] Patient limited by other medical complications  [] Other:     Prognosis: [x] Good [] Fair  [] Poor       ASSESSMENT: Pt with history of back, hip and knee pain. Referred for treatment of right hip pain and will benefit from aquatic therapy for pain reduction and  functional mobility. 23 Pt would benefit from skilled aquatic therapy to  pain, increase ROM, flexibility, balance, endurance, gait, to improve function and ADL's. Tolerated above ex well. Discussed eating and drinking water before therapy. Discussed use of heat/ ice on vacation as needed. Pain decreased after Rx. GOALS:  Patient stated goal: to get rid of the hip and back pain. [] Progressing: [] Met: [] Not Met: [] Adjusted     Therapist goals for Patient:   Short Term Goals: To be achieved in: 2 weeks  1. Independent in HEP and progression per patient tolerance, in order to prevent re-injury. [] Progressing: [] Met: [] Not Met: [] Adjusted  2. Patient will have a decrease in pain to facilitate improvement in movement, function, and ADLs as indicated by Functional Deficits. [] Progressing: [] Met: [] Not Met: [] Adjusted     Long Term Goals: To be achieved in: 8 weeks  1. Decrease WOMAC functional outcome score from 57 to 45 to assist with reaching prior level of function. [] Progressing: [] Met: [] Not Met: [] Adjusted  2. Patient will demonstrate increased AROM to wfl to allow for proper joint functioning as indicated by patients Functional Deficits. [] Progressing: [] Met: [] Not Met: [] Adjusted  3. Patient will demonstrate an increase in Strength to good proximal hip strength and control, within 5lb HHD in LE to allow for proper functional mobility as indicated by patients Functional Deficits. [] Progressing: [] Met: [] Not Met: [] Adjusted  4. Patient will return to 75% functional activities without increased symptoms or restriction.    []

## 2023-06-13 ENCOUNTER — APPOINTMENT (OUTPATIENT)
Dept: PHYSICAL THERAPY | Age: 78
End: 2023-06-13
Payer: MEDICARE

## 2023-06-15 ENCOUNTER — APPOINTMENT (OUTPATIENT)
Dept: PHYSICAL THERAPY | Age: 78
End: 2023-06-15
Payer: MEDICARE

## 2023-06-20 ENCOUNTER — HOSPITAL ENCOUNTER (OUTPATIENT)
Dept: PHYSICAL THERAPY | Age: 78
Setting detail: THERAPIES SERIES
End: 2023-06-20
Payer: MEDICARE

## 2023-06-22 ENCOUNTER — HOSPITAL ENCOUNTER (OUTPATIENT)
Dept: PHYSICAL THERAPY | Age: 78
Setting detail: THERAPIES SERIES
Discharge: HOME | End: 2023-06-22
Payer: MEDICARE

## 2023-06-22 ENCOUNTER — APPOINTMENT (OUTPATIENT)
Dept: PHYSICAL THERAPY | Age: 78
End: 2023-06-22
Payer: MEDICARE

## 2023-06-22 PROCEDURE — 97113 AQUATIC THERAPY/EXERCISES: CPT

## 2023-06-22 PROCEDURE — 97150 GROUP THERAPEUTIC PROCEDURES: CPT

## 2023-06-22 NOTE — FLOWSHEET NOTE
[address] [place]  [address] [facility]  Phone: [phone]   Fax:     [phone]      Physical Therapy Daily/Aquatic Flow Sheet   Date:  2023    Patient Name:  Yuridia Hall    :  1945  MRN: [id number]    Restrictions/Precautions:    none noted  Pertinent Medical History:Additional Pertinent Hx: Arthritis     CAD (coronary artery disease)     Colon cancer ([facility] [address]  colon 2019 dec   High blood pressure     Hyperlipidemia      Atrial fibrillation (HCC)     CPAP  dependence  Gout     Sleep apnea  B TKR, H/O LB Sx years ago    Medical/Treatment Diagnosis Information:   Medical Diagnosis: Unilateral primary osteoarthritis, right hip [M16.11]  Treatment Diagnosis: Decreased functional mobility 2/2 hip pain    Insurance/Certification information:  PT Insurance Information: [facility] Medicare  Physician Information:  Yamileth Olvera MD  Plan of care signed (Y/N): YES    Date of Patient follow up with Physician:      Progress Report: []  Yes  [x]  No     Date Range for reporting period:  Beginnin2023  Ending:      Progress report due (10 Rx/or 30 days whichever is less):      Recertification due (POC duration/ or 90 days whichever is less): 23     Visit # POC/Insurance Allowable Auth Needed   3 /  BOMN  5 visits 23 to 23 [x]Yes   []No     Latex Allergy:  [x]NO      []YES  Preferred Language for Healthcare:   [x]English       []Other:       History of Present Condition -  Had planned on right THR. Went in to Dr. Michelle Marcelo for pre-op, potential surgery was planned for beginning of . MD said the hips is not the problem it is your back. Bone scan was negative for right leg . Was given an injection in the right knee several months ago which helped a lot. Also said that the right hip was worn out. Getting injections in his spine for his leg and back. Also notes that his right hip is painful.

## 2023-06-27 ENCOUNTER — APPOINTMENT (OUTPATIENT)
Dept: PHYSICAL THERAPY | Age: 78
End: 2023-06-27
Payer: MEDICARE

## 2023-06-27 ENCOUNTER — HOSPITAL ENCOUNTER (OUTPATIENT)
Dept: PHYSICAL THERAPY | Age: 78
Setting detail: THERAPIES SERIES
Discharge: HOME | End: 2023-06-27
Payer: MEDICARE

## 2023-06-27 PROCEDURE — 97150 GROUP THERAPEUTIC PROCEDURES: CPT

## 2023-06-27 PROCEDURE — 97113 AQUATIC THERAPY/EXERCISES: CPT

## 2023-06-27 PROCEDURE — 97110 THERAPEUTIC EXERCISES: CPT

## 2023-06-27 PROCEDURE — 97530 THERAPEUTIC ACTIVITIES: CPT

## 2023-06-29 ENCOUNTER — APPOINTMENT (OUTPATIENT)
Dept: PHYSICAL THERAPY | Age: 78
End: 2023-06-29
Payer: MEDICARE

## 2023-06-29 ENCOUNTER — HOSPITAL ENCOUNTER (OUTPATIENT)
Dept: PHYSICAL THERAPY | Age: 78
Setting detail: THERAPIES SERIES
Discharge: HOME | End: 2023-06-29
Payer: MEDICARE

## 2023-06-29 PROCEDURE — 97113 AQUATIC THERAPY/EXERCISES: CPT

## 2023-06-29 PROCEDURE — 97150 GROUP THERAPEUTIC PROCEDURES: CPT

## 2023-08-22 PROBLEM — R42 LIGHT HEADEDNESS: Status: ACTIVE | Noted: 2023-08-22

## 2024-01-12 RX ORDER — ACETAMINOPHEN 500 MG
500 TABLET ORAL EVERY 6 HOURS PRN
COMMUNITY

## 2024-01-12 NOTE — FLOWSHEET NOTE
Pt use cpap machine        [facility] PRE-OPERATIVE INSTRUCTIONS    Day of Procedure:   1/16            Arrival time:     9           Surgery time:1030    Take the following medications with a sip of water:  Follow your MD/Surgeons pre-procedure instructions regarding your medications     Do not eat or drink anything after 12:00 midnight prior to your surgery.  This includes water chewing gum, mints and ice chips.   You may brush your teeth and gargle the morning of your surgery, but do not swallow the water     Please see your family doctor/pediatrician for a history and physical and/or concerning medications.   Bring any test results/reports from your physicians office.   If you are under the care of a heart doctor or specialist doctor, please be aware that you may be asked to them for clearance    You may be asked to stop blood thinners such as Coumadin, Plavix, Fragmin, Lovenox, etc., or any anti-inflammatories such as:  Aspirin, Ibuprofen, Advil, Naproxen prior to your surgery.    We also ask that you stop any OTC medications such as fish oil, vitamin E, glucosamine, garlic, Multivitamins, COQ 10, etc.    We ask that you do not smoke 24 hours prior to surgery  We ask that you do not  drink any alcoholic beverages 24 hours prior to surgery     You must make arrangements for a responsible adult to take you home after your surgery.    For your safety you will not be allowed to leave alone or drive yourself home.  Your surgery will be cancelled if you do not have a ride home.     Also for your safety, it is strongly suggested that someone stay with you the first 24 hours after your surgery.     A parent or legal guardian must accompany a child scheduled for surgery and plan to stay at the hospital until the child is discharged.    Please do not bring other children with you.    For your comfort, please wear simple loose fitting clothing to the hospital.  Please do not bring valuables.    Do not wear any

## 2024-01-15 ENCOUNTER — ANESTHESIA EVENT (OUTPATIENT)
Dept: ENDOSCOPY | Age: 79
End: 2024-01-15
Payer: MEDICARE

## 2024-01-16 ENCOUNTER — ANESTHESIA (OUTPATIENT)
Dept: ENDOSCOPY | Age: 79
End: 2024-01-16
Payer: MEDICARE

## 2024-01-16 ENCOUNTER — HOSPITAL ENCOUNTER (OUTPATIENT)
Age: 79
Setting detail: OUTPATIENT SURGERY
Discharge: HOME | End: 2024-01-16
Attending: INTERNAL MEDICINE | Admitting: INTERNAL MEDICINE
Payer: MEDICARE

## 2024-01-16 VITALS
OXYGEN SATURATION: 96 % | SYSTOLIC BLOOD PRESSURE: 168 MMHG | HEIGHT: 69 IN | BODY MASS INDEX: 39.25 KG/M2 | TEMPERATURE: 97.1 F | DIASTOLIC BLOOD PRESSURE: 85 MMHG | RESPIRATION RATE: 19 BRPM | HEART RATE: 58 BPM | WEIGHT: 265 LBS

## 2024-01-16 DIAGNOSIS — K59.00 CONSTIPATION, UNSPECIFIED CONSTIPATION TYPE: ICD-10-CM

## 2024-01-16 DIAGNOSIS — Z85.038 PERSONAL HISTORY OF COLON CANCER: ICD-10-CM

## 2024-01-16 PROCEDURE — 2580000003 HC RX 258: Performed by: ANESTHESIOLOGY

## 2024-01-16 PROCEDURE — 2709999900 HC NON-CHARGEABLE SUPPLY: Performed by: INTERNAL MEDICINE

## 2024-01-16 PROCEDURE — 2500000003 HC RX 250 WO HCPCS

## 2024-01-16 PROCEDURE — 3700000001 HC ADD 15 MINUTES (ANESTHESIA): Performed by: INTERNAL MEDICINE

## 2024-01-16 PROCEDURE — 88305 TISSUE EXAM BY PATHOLOGIST: CPT

## 2024-01-16 PROCEDURE — 3700000000 HC ANESTHESIA ATTENDED CARE: Performed by: INTERNAL MEDICINE

## 2024-01-16 PROCEDURE — 6360000002 HC RX W HCPCS

## 2024-01-16 PROCEDURE — 7100000010 HC PHASE II RECOVERY - FIRST 15 MIN: Performed by: INTERNAL MEDICINE

## 2024-01-16 PROCEDURE — 7100000001 HC PACU RECOVERY - ADDTL 15 MIN: Performed by: INTERNAL MEDICINE

## 2024-01-16 PROCEDURE — 7100000000 HC PACU RECOVERY - FIRST 15 MIN: Performed by: INTERNAL MEDICINE

## 2024-01-16 PROCEDURE — 3609010600 HC COLONOSCOPY POLYPECTOMY SNARE/COLD BIOPSY: Performed by: INTERNAL MEDICINE

## 2024-01-16 PROCEDURE — 2580000003 HC RX 258

## 2024-01-16 RX ORDER — SODIUM CHLORIDE 0.9 % (FLUSH) 0.9 %
5-40 SYRINGE (ML) INJECTION EVERY 12 HOURS SCHEDULED
Status: DISCONTINUED | OUTPATIENT
Start: 2024-01-16 | End: 2024-01-16 | Stop reason: HOSPADM

## 2024-01-16 RX ORDER — ONDANSETRON 2 MG/ML
4 INJECTION INTRAMUSCULAR; INTRAVENOUS
Status: DISCONTINUED | OUTPATIENT
Start: 2024-01-16 | End: 2024-01-16 | Stop reason: HOSPADM

## 2024-01-16 RX ORDER — DIPHENHYDRAMINE HYDROCHLORIDE 50 MG/ML
12.5 INJECTION INTRAMUSCULAR; INTRAVENOUS
Status: DISCONTINUED | OUTPATIENT
Start: 2024-01-16 | End: 2024-01-16 | Stop reason: HOSPADM

## 2024-01-16 RX ORDER — PROPOFOL 10 MG/ML
INJECTION, EMULSION INTRAVENOUS PRN
Status: DISCONTINUED | OUTPATIENT
Start: 2024-01-16 | End: 2024-01-16 | Stop reason: SDUPTHER

## 2024-01-16 RX ORDER — SODIUM CHLORIDE 9 MG/ML
INJECTION, SOLUTION INTRAVENOUS PRN
Status: DISCONTINUED | OUTPATIENT
Start: 2024-01-16 | End: 2024-01-16 | Stop reason: HOSPADM

## 2024-01-16 RX ORDER — SODIUM CHLORIDE 0.9 % (FLUSH) 0.9 %
5-40 SYRINGE (ML) INJECTION PRN
Status: DISCONTINUED | OUTPATIENT
Start: 2024-01-16 | End: 2024-01-16 | Stop reason: HOSPADM

## 2024-01-16 RX ORDER — LIDOCAINE HYDROCHLORIDE 20 MG/ML
INJECTION, SOLUTION EPIDURAL; INFILTRATION; INTRACAUDAL; PERINEURAL PRN
Status: DISCONTINUED | OUTPATIENT
Start: 2024-01-16 | End: 2024-01-16 | Stop reason: SDUPTHER

## 2024-01-16 RX ORDER — SODIUM CHLORIDE 9 MG/ML
INJECTION, SOLUTION INTRAVENOUS CONTINUOUS PRN
Status: DISCONTINUED | OUTPATIENT
Start: 2024-01-16 | End: 2024-01-16 | Stop reason: SDUPTHER

## 2024-01-16 RX ADMIN — PROPOFOL 50 MG: 10 INJECTION, EMULSION INTRAVENOUS at 10:08

## 2024-01-16 RX ADMIN — LIDOCAINE HYDROCHLORIDE 100 MG: 20 INJECTION, SOLUTION EPIDURAL; INFILTRATION; INTRACAUDAL; PERINEURAL at 10:08

## 2024-01-16 RX ADMIN — SODIUM CHLORIDE: 9 INJECTION, SOLUTION INTRAVENOUS at 10:04

## 2024-01-16 RX ADMIN — PROPOFOL 180 MCG/KG/MIN: 10 INJECTION, EMULSION INTRAVENOUS at 10:09

## 2024-01-16 RX ADMIN — SODIUM CHLORIDE, PRESERVATIVE FREE 10 ML: 5 INJECTION INTRAVENOUS at 09:41

## 2024-01-16 ASSESSMENT — PAIN SCALES - GENERAL: PAINLEVEL_OUTOF10: 0

## 2024-01-16 ASSESSMENT — PAIN - FUNCTIONAL ASSESSMENT
PAIN_FUNCTIONAL_ASSESSMENT: NONE - DENIES PAIN
PAIN_FUNCTIONAL_ASSESSMENT: 0-10

## 2024-01-16 NOTE — H&P
assessed the patient and find that the patient is in satisfactory condition to proceed with the planned procedure and sedation plan.    I have explained the risk, benefits, and alternatives to the procedure; the patient understands and agrees to proceed.       Bandar Guthrie MD  1/16/2024

## 2024-01-16 NOTE — OP NOTE
Colonoscopy Procedure Note      Patient: Ruslan Caal  : 1945  Acct#:     Procedure: Colonoscopy with polypectomy (cold snare)    Date:  2024    Surgeon:  Bandar Guthrie MD    Referring Physician:  Jhonathan Larsen MD    Preoperative Diagnosis:  78 y.o. male  who presents for colonoscopy due to colon cancer surveillance, with a history of colon cancer in 2019 s/p right hemicolectomy.     Postoperative Diagnosis:    4 mm proximal transverse colon polyp, ,resected with cold snare  Healthy ileocolonic anastomosis    Consent:  The patient or their legal guardian has signed a consent, and is aware of the potential risks, benefits, alternatives, and potential complications of this procedure.  These include, but are not limited to hemorrhage, bleeding, post procedural pain, perforation, phlebitis, aspiration, hypotension, hypoxia, cardiovascular events such as arryhthmia, and possibly death.  Additionally, the possibility of missed colonic polyps and interval colon cancer was discussed in the consent.    Anesthesia:  MAC    Procedure:   An informed consent was obtained from the patient after explanation of indications, benefits, possible risks and complications of the procedure.  The patient was then taken to the endoscopy suite, placed in the left lateral decubitus position, and the above IV anesthesia was administered.    A digital rectal examination was performed and revealed negative without mass, lesions or tenderness.      The Olympus video colonoscope was placed in the patient's rectum under digital direction and advanced to the ileocolonic anastomsis. The cecum was identified by characteristic anatomy and ballottment. The ileocecal valve was identified.     The preparation was good.    The neoterminal ileum was not inspected.    The scope was then withdrawn back through the cecum, ascending, transverse, descending, sigmoid colon, and rectum.  Careful circumferential examination of the

## 2024-01-16 NOTE — ANESTHESIA POSTPROCEDURE EVALUATION
Department of Anesthesiology  Postprocedure Note    Patient: Ruslan Caal  MRN: [id number]  YOB: 1945  Date of evaluation: 1/16/2024    Procedure Summary       Date: 01/16/24 Room / Location: [address]    Anesthesia Start: 1004 Anesthesia Stop: 1029    Procedure: COLONOSCOPY POLYPECTOMY SNARE/COLD BIOPSY Diagnosis:       Personal history of colon cancer      Constipation, unspecified constipation type      (Personal history of colon cancer [Z85.038])      (Constipation, unspecified constipation type [K59.00])    Surgeons: Bandar Guthrie MD Responsible Provider: Cecilio Garza MD    Anesthesia Type: MAC ASA Status: 3            Anesthesia Type: MAC    Maria E Phase I: Maria E Score: 10    Maria E Phase II: Maria E Score: 10    Anesthesia Post Evaluation    Patient location during evaluation: bedside  Patient participation: complete - patient participated  Level of consciousness: awake and alert  Pain score: 0  Nausea & Vomiting: no nausea  Cardiovascular status: hemodynamically stable  Respiratory status: acceptable  Hydration status: stable  Pain management: adequate    No notable events documented.

## 2024-01-16 NOTE — DISCHARGE INSTRUCTIONS
Discharge Instructions for Colonoscopy         Colonoscopy is a visual exam of the lining of the large intestine, also called the bowel or colon, with a colonoscope. A colonoscope is a flexible tube with a light and a viewing device. It allows the doctor to view the inside of the colon through a tiny video camera.     Colonoscopy is performed for many reasons: unexplained anemia , pain, diarrhea , bloody stools, cancer screening, among many other reasons.     Complications from a colonoscopy are rare. Some possible serious complications include perforated bowel (which might require surgery) and bleeding (which could require blood transfusion ). Minor complications include bloating, gas, and cramping that can last for 1-2 days after the procedure.     Because air is put into your colon during the procedure, it is normal to pass large amounts of air from your rectum. You may not have a bowel movement for 1-3 days after the procedure.     What You Will Need:  Someone to drive you home after the procedure     Steps to Take:  Home Care -  Rest when you get home.   Because the sedative will make you drowsy, don't drive, operate machinery, or make important decisions the day of the procedure.  Feelings of bloating, gas, or cramping may persist for 24 hours.   Diet -  Try sips of water first. If tolerated, resume bland food (scrambled eggs, toast, soup) first.  If tolerated, resume regular diet or the diet recommended by your physician.   Do not drink alcohol for 24 hours.   Physical Activity -  Ask your doctor when you will be able to return to work.   Do not drive, operate heavy machinery, or do activities that require coordination or balance for 24 hours.   Otherwise, return to your normal routine as soon as you are comfortable to do so, which is usually the next day after the procedure.   Medications - When taking medications, it's important to:   Take your medication as directed, not more, not less, not at a different

## 2024-01-16 NOTE — PROGRESS NOTES
[facility] Pre-Admission Testing Electronic Communication Worksheet for OR/ENDO Procedures        Patient: Ruslan Caal    DOS: 1/16    Arrival Time: 9    Surgery Time:1030    Meds to Bed:  [x] YES    []  NO    Transportation Confirmed: [x] YES    []  NO    History and Physical:  [] YES    []  NO  [x] N/A  If yes, please list doctor or Urgent Care and date of H&P:     Additional Clearance(Cardiac, Pulmonary, etc):  [] YES    [x]  NO    Pre-Admission Testing Visit:  [] YES    [x]  NO If no, do labs/testing need to be done DOS?  [] YES    [x]  NO    Medication Reconciliation Complete:  [x] YES    []  NO        Additional Notes:                Interview Complete: [x] YES    []  NO          Jazzy Mcknight RN  1:47 PM  
Medications administered and monitored by CRNA, see anesthesia record.   
Pt awake, denies pain.  Lottie po liquids well.  Abd remains soft.  To phase 2 care.   
Pt is alert and oriented and denies pain at this time, vital signs stable on room air. Tolerating a drink and snack. Discharge instructions given to Pt and spouse, all questions answered. Pt discharged to home with wife to transport.   
To pacu from endo.  Pt awake, denies pain. Abd rounded and soft.  IV infusing. Monitor in sinus rhythm.    
on your fingers or toes      For your safety, please do not wear any jewelry or body piercing's on the day of surgery.   All jewelry must be removed.      If you have dentures, they will be removed before going to operating room.    For your convenience, we will provide you with a container.    If you wear contact lenses or glasses, they will be removed, please bring a case for them.     If you have a living will and a durable power of  for healthcare, please bring in a copy.     As part of our patient safety program to minimize surgical site infections, we ask you to do the following:    Please notify your surgeon if you develop any illness between         now and the  day of your surgery.    This includes a cough, cold, fever, sore throat, nausea,         or vomiting, and diarrhea, etc.   Please notify your surgeon if you experience dizziness, shortness         of breath or blurred vision between now and the time of your surgery.      Do not shave your operative site 96 hours prior to surgery.   For face and neck surgery, men may use an electric razor 48 hours   prior to surgery.    You may shower the night before surgery or the morning of   your surgery with an antibacterial soap.    You will need to bring a photo ID and insurance card    [facility] has an onsite pharmacy, would you like to utilize our pharmacy     If you will be staying overnight and use a C-pap machine, please bring   your C-pap to hospital     Our goal is to provide you with excellent care, therefore, visitors will be limited to two(2) in the room at a time so that we may focus on providing this care for you.          Please contact pre-admission testing if you have any further questions.                 Ann Bryant phone number:  [phone]     [facility] fax number:  [phone]  Please note these are generalized instructions for all surgical cases, you may be provided with more specific instructions according to your

## 2024-03-11 ENCOUNTER — HOSPITAL ENCOUNTER (OUTPATIENT)
Dept: CT IMAGING | Age: 79
Discharge: HOME | End: 2024-03-11
Attending: INTERNAL MEDICINE
Payer: MEDICARE

## 2024-03-11 DIAGNOSIS — C18.2 MALIGNANT NEOPLASM OF ASCENDING COLON (HCC): ICD-10-CM

## 2024-03-11 PROCEDURE — 6360000004 HC RX CONTRAST MEDICATION: Performed by: INTERNAL MEDICINE

## 2024-03-11 PROCEDURE — 71260 CT THORAX DX C+: CPT

## 2024-03-11 RX ADMIN — IOPAMIDOL 50 ML: 612 INJECTION, SOLUTION INTRAVENOUS at 10:04

## 2024-03-11 RX ADMIN — IOPAMIDOL 75 ML: 755 INJECTION, SOLUTION INTRAVENOUS at 10:04

## 2024-03-15 ENCOUNTER — CARE COORDINATION (OUTPATIENT)
Dept: CARE COORDINATION | Age: 79
End: 2024-03-15

## 2024-03-19 ENCOUNTER — CARE COORDINATION (OUTPATIENT)
Dept: CARE COORDINATION | Age: 79
End: 2024-03-19

## 2024-04-07 NOTE — FLOWSHEET NOTE
Referral for Day Rehab sent to:    [facility] [email or web address]  [id number] [address]  Phone: [phone] or [phone]  Fax: [phone]         proper ROM for normal function with self care, mobility, lifting and ambulation. Individual Aquatic Therapy:  [x] (34509) Provided verbal and tactile cueing for strengthening, flexibility, ROM using the therapeutic properties of water (buoyancy, resistance) for improvements in core control, mobility and ambulation     Aquatic Group:  [x] (29862) Provided intermittent verbal and tactile cueing for strengthening, endurance, flexibility and ROM for 2-4 individuals that do not require one-on one patient contact by the therapist     Approval Dates:  CPT Code Units Approved Units Used  Date Updated:                     Land Timed Code Treatment Minutes: 38   Land Total Treatment Minutes: 53     Individual Aquatic Minutes:    Aquatic Group Minutes:      [x] EVAL (LOW) 08482 (typically 20 minutes face-to-face)  [] EVAL (MOD) 32078 (typically 30 minutes face-to-face)  [] EVAL (HIGH) 34174 (typically 45 minutes face-to-face)  [] RE-EVAL     [x] KJ(84863) x     [] Dry needle 1 or 2 Muscles (39871)  [] NMR (10434) x     [] Dry needle 3+ Muscles (73736)  [] Manual (40712) x     [] Ultrasound (32583) x  [x] TA (61384) x    2 [] Mech Traction (44203)  [] ES(attended) (85565)     [] ES (un) (81285):   [] Vasopump (61328) [] Ionto (63030)   [] Aquatic Ind (73708) x    [] Aquatic Group (15585) x   [] Other:    Treatment/Activity Tolerance:  [] Patient tolerated treatment well [] Patient limited by fatigue  [x] Patient limited by pain  [] Patient limited by other medical complications  [] Other:     Prognosis: [x] Good [] Fair  [] Poor       ASSESSMENT: Pt with history of back, hip and knee pain. Referred for treatment of right hip pain and will benefit from aquatic therapy for pain reduction and  functional mobility. GOALS:  Patient stated goal: to get rid of the hip and back pain. [] Progressing: [] Met: [] Not Met: [] Adjusted     Therapist goals for Patient:   Short Term Goals: To be achieved in: 2 weeks  1.

## 2024-09-11 ENCOUNTER — HOSPITAL ENCOUNTER (OUTPATIENT)
Dept: CT IMAGING | Age: 79
Discharge: HOME | End: 2024-09-11
Attending: INTERNAL MEDICINE
Payer: MEDICARE

## 2024-09-11 DIAGNOSIS — C18.2 CANCER OF ASCENDING COLON (HCC): ICD-10-CM

## 2024-09-11 LAB
PERFORMED ON: NORMAL
POC CREATININE: 1 MG/DL (ref 0.8–1.3)
POC SAMPLE TYPE: NORMAL

## 2024-09-11 PROCEDURE — 82565 ASSAY OF CREATININE: CPT

## 2024-09-11 PROCEDURE — 6360000004 HC RX CONTRAST MEDICATION: Performed by: INTERNAL MEDICINE

## 2024-09-11 PROCEDURE — 74177 CT ABD & PELVIS W/CONTRAST: CPT

## 2024-09-11 RX ORDER — IOPAMIDOL 755 MG/ML
75 INJECTION, SOLUTION INTRAVASCULAR
Status: COMPLETED | OUTPATIENT
Start: 2024-09-11 | End: 2024-09-11

## 2024-09-11 RX ORDER — IOPAMIDOL 612 MG/ML
50 INJECTION, SOLUTION INTRAVASCULAR
Status: COMPLETED | OUTPATIENT
Start: 2024-09-11 | End: 2024-09-11

## 2024-09-11 RX ADMIN — IOPAMIDOL 50 ML: 612 INJECTION, SOLUTION INTRAVENOUS at 09:10

## 2024-09-11 RX ADMIN — IOPAMIDOL 75 ML: 755 INJECTION, SOLUTION INTRAVENOUS at 09:10

## 2025-01-08 ENCOUNTER — OFFICE VISIT (OUTPATIENT)
Dept: SLEEP MEDICINE | Age: 80
End: 2025-01-08

## 2025-01-08 VITALS
WEIGHT: 278.4 LBS | SYSTOLIC BLOOD PRESSURE: 130 MMHG | OXYGEN SATURATION: 98 % | HEART RATE: 64 BPM | DIASTOLIC BLOOD PRESSURE: 80 MMHG | RESPIRATION RATE: 18 BRPM | BODY MASS INDEX: 41.23 KG/M2 | HEIGHT: 69 IN | TEMPERATURE: 98 F

## 2025-01-08 DIAGNOSIS — G47.33 OSA ON CPAP: Primary | ICD-10-CM

## 2025-01-08 DIAGNOSIS — Z99.89 DEPENDENCE ON OTHER ENABLING MACHINES AND DEVICES: ICD-10-CM

## 2025-01-08 DIAGNOSIS — I48.0 PAROXYSMAL ATRIAL FIBRILLATION (HCC): ICD-10-CM

## 2025-01-08 DIAGNOSIS — E66.01 CLASS 3 SEVERE OBESITY DUE TO EXCESS CALORIES WITH SERIOUS COMORBIDITY AND BODY MASS INDEX (BMI) OF 40.0 TO 44.9 IN ADULT: ICD-10-CM

## 2025-01-08 DIAGNOSIS — E66.813 CLASS 3 SEVERE OBESITY DUE TO EXCESS CALORIES WITH SERIOUS COMORBIDITY AND BODY MASS INDEX (BMI) OF 40.0 TO 44.9 IN ADULT: ICD-10-CM

## 2025-01-08 DIAGNOSIS — I10 ESSENTIAL HYPERTENSION, BENIGN: ICD-10-CM

## 2025-01-08 ASSESSMENT — SLEEP AND FATIGUE QUESTIONNAIRES
HOW LIKELY ARE YOU TO NOD OFF OR FALL ASLEEP WHILE LYING DOWN TO REST IN THE AFTERNOON WHEN CIRCUMSTANCES PERMIT: HIGH CHANCE OF DOZING
ESS TOTAL SCORE: 8
HOW LIKELY ARE YOU TO NOD OFF OR FALL ASLEEP WHILE SITTING INACTIVE IN A PUBLIC PLACE: HIGH CHANCE OF DOZING
HOW LIKELY ARE YOU TO NOD OFF OR FALL ASLEEP IN A CAR, WHILE STOPPED FOR A FEW MINUTES IN TRAFFIC: WOULD NEVER DOZE
HOW LIKELY ARE YOU TO NOD OFF OR FALL ASLEEP WHILE SITTING QUIETLY AFTER LUNCH WITHOUT ALCOHOL: WOULD NEVER DOZE
HOW LIKELY ARE YOU TO NOD OFF OR FALL ASLEEP WHILE SITTING AND TALKING TO SOMEONE: WOULD NEVER DOZE
HOW LIKELY ARE YOU TO NOD OFF OR FALL ASLEEP WHILE WATCHING TV: MODERATE CHANCE OF DOZING
HOW LIKELY ARE YOU TO NOD OFF OR FALL ASLEEP WHEN YOU ARE A PASSENGER IN A CAR FOR AN HOUR WITHOUT A BREAK: WOULD NEVER DOZE
HOW LIKELY ARE YOU TO NOD OFF OR FALL ASLEEP WHILE SITTING AND READING: WOULD NEVER DOZE

## 2025-01-08 NOTE — PROGRESS NOTES
Ruslan Caal         : 1945  [] [facility]     [x] [facility]      [] [facility]     [][facility]    [] [facility]  [] [facility]  [] [facility]   [] [facility] [] Other:  Diagnosis: [x] RENETTA (G47.33) [] CSA (G47.31) [] Apnea (G47.30)   Length of Need: [] 12 Months [x] 99 Months [] Other:    Machine (DENYS!):  [x] ResMed AirSense     Auto [] Other:     []  CPAP () [] Bilevel ()   Mode: [] Auto [] Spontaneous    Mode: [] Auto [] Spontaneous                     New machine 15/10 cm        Comfort Settings:       - Ramp Pressure: 5 cmH2O                                        - Ramp time: 15 min                                     -  Flex/EPR - 3 full time  If the order is for BiLevel:                                    - For ResMed Bilevel (TiMax-4 sec   TiMin- 0.2 sec)     Humidifier: [x] Heated ()        [x] Water chamber replacement ()/ 1 per 6 months        Mask:  Please always start with the mask the patient used during the titraion    [x] Full Face () /1 per 3 months    [x] Patient Choice - Size and fit mask    [] Dispense:     [x] Headgear () / 1 per 3 months    [x] Interface Replacement ()/1 per month            Tubing: [x] Heated ()/1 per 3 months    [] Standard ()/1 per 3 months [] Other:           Filters: [x] Non-disposable ()/1 per 6 months     [x] Ultra-Fine, Disposable ()/2 per month        Miscellaneous: [x] Chin Strap ()/ 1 per 6 months [] O2 bleed-in:       LPM   [] Oximetry on CPAP/Bilevel []  Other:          Start Order Date: 25    MEDICAL JUSTIFICATION:  I, the undersigned, certify that the above prescribed supplies are medically necessary for this patient’s wellbeing.  In my opinion, the supplies are both reasonable and necessary in reference to accepted standards of medicalpractice in treatment of this patient’s condition.    Zia Corley MD      NPI: [id number]       Order Signed Date:

## 2025-01-08 NOTE — PROGRESS NOTES
this disorder.  Cont any meds per PCP and other physicians.     3. Morbid obesity:  Assessment & Plan:  Chronic-not stable:    The proportionality between weight and AHI.  With 10% weight change, the AHI has a 27% proportionate change.  With 20% weight change, the AHI has a 45-50% proportionate change.   Weight loss usually helps the obstructive sleep apnea, and the Zepbound is weight loss drug.   The Zepnound is weight loss drug, The FDI approval for obstructive sleep apnea will help the weight management provider to preauthorize the drug for weight loss.    The weight loss drug alone without the entire multidisciplinary (Dietitian, behaviorist, exercise) approach will fail.     Zepbound approved by FDA as first sleep apnea medication  On Dec. 20, [employer] announced the FDA has approved Zepbound (tirzepatide) as the first and only prescription medication for adults with moderate-to-severe obstructive sleep apnea and obesity.  About Zepbound  Zepbound is an injectable prescription medication that targets GIP and GLP-1 hormones to regulate appetite and metabolism. By reducing hunger and food intake, it addresses an underlying cause of excess weight.  The medication may improve moderate-to-severe RENETTA and aid in long-term weight loss for adults with obesity or overweight with related health issues when combined with a reduced-calorie diet and increased physical activity.  The FDA approval is based on results of the [facility]MOUNT-RENETTA phase 3 clinical trials, a global, multi-center study comparing Zepbound to placebo in adults with moderate-to-severe RENETTA and obesity. Randomizing 469 participants, the trial evaluated changes in apnea-hypopnea index over 52 weeks, including both those using and not using positive airway pressure therapy. Participants received either Zepbound (10 mg or 15 mg) or placebo.  Results show that Zepbound reduced breathing disruptions by 25-29 per hour versus 5-6 with placebo and achieved sleep apnea

## 2025-01-08 NOTE — PATIENT INSTRUCTIONS
Orders Placed This Encounter   Procedures    Ambulatory Referral to Bariatric Surgery     Referral Priority:   Routine     Referral Type:   Eval and Treat     Referral Reason:   Specialty Services Required     Requested Specialty:   Bariatric Surgery     Number of Visits Requested:   1

## 2025-03-04 ENCOUNTER — TELEPHONE (OUTPATIENT)
Dept: BARIATRICS/WEIGHT MGMT | Age: 80
End: 2025-03-04

## 2025-03-05 ENCOUNTER — OFFICE VISIT (OUTPATIENT)
Dept: BARIATRICS/WEIGHT MGMT | Age: 80
End: 2025-03-05

## 2025-03-05 VITALS
HEART RATE: 80 BPM | OXYGEN SATURATION: 97 % | HEIGHT: 69 IN | SYSTOLIC BLOOD PRESSURE: 128 MMHG | WEIGHT: 277 LBS | BODY MASS INDEX: 41.03 KG/M2 | DIASTOLIC BLOOD PRESSURE: 70 MMHG | RESPIRATION RATE: 16 BRPM

## 2025-03-05 DIAGNOSIS — E66.01 MORBID OBESITY WITH BMI OF 40.0-44.9, ADULT: Primary | ICD-10-CM

## 2025-03-05 NOTE — PROGRESS NOTES
Ruslan Caal is a 79 y.o. male with a date of birth of 1945.    Vitals:    03/05/25 1232   BP: 128/70   Pulse: 80   Resp: 16   SpO2: 97%   Weight: 125.6 kg (277 lb)   Height: 1.753 m (5' 9\")    BMI: Body mass index is 40.91 kg/m². Obesity Classification: Class III    Weight History:   Wt Readings from Last 3 Encounters:   03/05/25 125.6 kg (277 lb)   01/20/25 126.1 kg (278 lb)   01/08/25 126.3 kg (278 lb 6.4 oz)       Pt attended Medical Weight Management Seminar. Patient was educated on low-carb diet protocol. Nutrition and habit guidelines were discussed and written information was provided. Bariatric Nutrition Questionnaire completed during class and scanned into media.       Goals  Weight: 225 lbs  Health Improvement: fewer meds, less back pain, and better ability to walk    Assessment  Nutritional Needs: RMR=(9.99 x 125.6) + (6.25 x 175.3) - (4.92 x 79 y.o.) +5  = 1962 kcal x 1.3 (sedentary activity factor)= 2551 kcal - 1000 (for 2 lb weight loss/week)= 1551 kcal.    Patient has participated in the following weight loss programs: Calorie Restriction.    Patient has not participated in meal replacement/liquid diets.  Patient has not participated in weight loss medications.  Patient does not have history of bariatric surgery.     Pt exercises 2-3x/week.   Pt states he is a fast eater and often feels overly full or stuffed after eating.   Pt drinks alcohol.    Plan  Plan/Recommendations: Start 1500 kcal LCMP  Optifast:  Not interested.  Diet Medications:  Pt interested.  Bariatric Surgery:  Not interested.  1:1 RD Visit:  Not interested.     PES Statement: Overweight/Obesity related to lack of exercise, sedentary lifestyle, unhealthy eating habits, and unsuccessful diet attempts as evidenced by BMI. Body mass index is 40.91 kg/m².    Handouts: protein bars/shakes    Will follow up as necessary.    Nelly Montgomery RD

## 2025-03-06 ENCOUNTER — TELEMEDICINE (OUTPATIENT)
Dept: BARIATRICS/WEIGHT MGMT | Age: 80
End: 2025-03-06

## 2025-03-06 DIAGNOSIS — E66.01 MORBID OBESITY WITH BMI OF 40.0-44.9, ADULT: Primary | ICD-10-CM

## 2025-03-06 DIAGNOSIS — Z71.3 DIETARY COUNSELING AND SURVEILLANCE: ICD-10-CM

## 2025-03-06 ASSESSMENT — ENCOUNTER SYMPTOMS
WHEEZING: 0
NAUSEA: 0
CONSTIPATION: 0
COUGH: 0
DIARRHEA: 0
SHORTNESS OF BREATH: 0
ABDOMINAL DISTENTION: 0
EYE PAIN: 0
VOMITING: 0
CHOKING: 0
ABDOMINAL PAIN: 0
CHEST TIGHTNESS: 0
BLOOD IN STOOL: 0
APNEA: 0
PHOTOPHOBIA: 0

## 2025-03-06 NOTE — PROGRESS NOTES
Patient: Ruslan Caal     Encounter Date: 3/6/2025    YOB: 1945               Age: 79 y.o.        Patient identification was verified at the start of the visit.         3/4/2025     1:46 PM   Patient-Reported Vitals   Patient-Reported Weight 265   Patient-Reported Height 5’9”         BP Readings from Last 1 Encounters:   03/05/25 128/70       BMI Readings from Last 1 Encounters:   03/05/25 40.91 kg/m²       Pulse Readings from Last 1 Encounters:   03/05/25 80                                             Wt Readings from Last 3 Encounters:   03/05/25 125.6 kg (277 lb)   01/20/25 126.1 kg (278 lb)   01/08/25 126.3 kg (278 lb 6.4 oz)        Chief Complaint   Patient presents with    Bariatric, Initial Visit     MARIELA- NP        HPI:    79 y.o. male presents to establish care via video visit. He was referred by Dr. Zia Corley for weight management. The patient's medical history is significant for class III obesity. The patient has a long-standing history of obesity which started gradually. The problem is severe.  The patient has been gaining weight.  Risk factors include annual weight gain of >2 lbs (1 kg)/ year and sedentary lifestyle. Aggravating factors include poor diet and lack of physical activity. The patient has tried various diet/exercise plans which have been ineffective in the long-run. he is motivated to start losing weight to help improve his overall health.     When did you become overweight?  [] Childhood   [] Teens   [x] Adulthood   [] Pregnancy   [] Menopause    Highest adult weight: 290 pounds at age 74    Triggers for weight gain?   [] Stress   [] Illness   [] Medications   [] Travel  []Injury     [] Nightshift work   [] Insomnia  [x] No specific triggers   [] Other    Food triggers:   [x] Stress   [x] Boredom   [] Fast food   [] Eating out   [] Seeking reward   [] Social     Have you ever taken prescription medications to help you lose weight?   [] Yes  [x] No    Have you ever

## 2025-03-12 ENCOUNTER — HOSPITAL ENCOUNTER (OUTPATIENT)
Dept: CT IMAGING | Age: 80
Discharge: HOME | End: 2025-03-12
Attending: INTERNAL MEDICINE
Payer: MEDICARE

## 2025-03-12 DIAGNOSIS — C18.2 CANCER OF ASCENDING COLON (HCC): ICD-10-CM

## 2025-03-12 LAB
PERFORMED ON: NORMAL
POC CREATININE: 1.1 MG/DL (ref 0.8–1.3)
POC SAMPLE TYPE: NORMAL

## 2025-03-12 PROCEDURE — 74177 CT ABD & PELVIS W/CONTRAST: CPT

## 2025-03-12 PROCEDURE — 82565 ASSAY OF CREATININE: CPT

## 2025-03-12 PROCEDURE — 6360000004 HC RX CONTRAST MEDICATION: Performed by: INTERNAL MEDICINE

## 2025-03-12 RX ORDER — IOPAMIDOL 612 MG/ML
50 INJECTION, SOLUTION INTRAVASCULAR
Status: COMPLETED | OUTPATIENT
Start: 2025-03-12 | End: 2025-03-12

## 2025-03-12 RX ORDER — IOPAMIDOL 755 MG/ML
75 INJECTION, SOLUTION INTRAVASCULAR
Status: COMPLETED | OUTPATIENT
Start: 2025-03-12 | End: 2025-03-12

## 2025-03-12 RX ADMIN — IOPAMIDOL 75 ML: 755 INJECTION, SOLUTION INTRAVENOUS at 09:03

## 2025-03-12 RX ADMIN — IOPAMIDOL 50 ML: 612 INJECTION, SOLUTION INTRAVENOUS at 09:03

## 2025-03-24 ASSESSMENT — SLEEP AND FATIGUE QUESTIONNAIRES
HOW LIKELY ARE YOU TO NOD OFF OR FALL ASLEEP WHILE SITTING INACTIVE IN A PUBLIC PLACE: SLIGHT CHANCE OF DOZING
HOW LIKELY ARE YOU TO NOD OFF OR FALL ASLEEP WHILE SITTING AND TALKING TO SOMEONE: SLIGHT CHANCE OF DOZING
HOW LIKELY ARE YOU TO NOD OFF OR FALL ASLEEP WHILE SITTING INACTIVE IN A PUBLIC PLACE: SLIGHT CHANCE OF DOZING
HOW LIKELY ARE YOU TO NOD OFF OR FALL ASLEEP IN A CAR, WHILE STOPPED FOR A FEW MINUTES IN TRAFFIC: WOULD NEVER DOZE
HOW LIKELY ARE YOU TO NOD OFF OR FALL ASLEEP WHILE LYING DOWN TO REST IN THE AFTERNOON WHEN CIRCUMSTANCES PERMIT: HIGH CHANCE OF DOZING
HOW LIKELY ARE YOU TO NOD OFF OR FALL ASLEEP WHILE SITTING AND READING: MODERATE CHANCE OF DOZING
HOW LIKELY ARE YOU TO NOD OFF OR FALL ASLEEP WHILE WATCHING TV: SLIGHT CHANCE OF DOZING
HOW LIKELY ARE YOU TO NOD OFF OR FALL ASLEEP WHEN YOU ARE A PASSENGER IN A CAR FOR AN HOUR WITHOUT A BREAK: MODERATE CHANCE OF DOZING
HOW LIKELY ARE YOU TO NOD OFF OR FALL ASLEEP WHILE SITTING AND READING: MODERATE CHANCE OF DOZING
HOW LIKELY ARE YOU TO NOD OFF OR FALL ASLEEP WHEN YOU ARE A PASSENGER IN A CAR FOR AN HOUR WITHOUT A BREAK: MODERATE CHANCE OF DOZING
HOW LIKELY ARE YOU TO NOD OFF OR FALL ASLEEP WHILE LYING DOWN TO REST IN THE AFTERNOON WHEN CIRCUMSTANCES PERMIT: HIGH CHANCE OF DOZING
HOW LIKELY ARE YOU TO NOD OFF OR FALL ASLEEP IN A CAR, WHILE STOPPED FOR A FEW MINUTES IN TRAFFIC: WOULD NEVER DOZE
HOW LIKELY ARE YOU TO NOD OFF OR FALL ASLEEP WHILE WATCHING TV: SLIGHT CHANCE OF DOZING
HOW LIKELY ARE YOU TO NOD OFF OR FALL ASLEEP WHILE SITTING AND TALKING TO SOMEONE: SLIGHT CHANCE OF DOZING
HOW LIKELY ARE YOU TO NOD OFF OR FALL ASLEEP WHILE SITTING QUIETLY AFTER LUNCH WITHOUT ALCOHOL: HIGH CHANCE OF DOZING
ESS TOTAL SCORE: 13
HOW LIKELY ARE YOU TO NOD OFF OR FALL ASLEEP WHILE SITTING QUIETLY AFTER LUNCH WITHOUT ALCOHOL: HIGH CHANCE OF DOZING

## 2025-03-27 ENCOUNTER — OFFICE VISIT (OUTPATIENT)
Dept: SLEEP MEDICINE | Age: 80
End: 2025-03-27
Payer: MEDICARE

## 2025-03-27 VITALS
TEMPERATURE: 97.9 F | HEIGHT: 69 IN | HEART RATE: 65 BPM | DIASTOLIC BLOOD PRESSURE: 70 MMHG | BODY MASS INDEX: 40.08 KG/M2 | RESPIRATION RATE: 18 BRPM | OXYGEN SATURATION: 98 % | WEIGHT: 270.6 LBS | SYSTOLIC BLOOD PRESSURE: 120 MMHG

## 2025-03-27 DIAGNOSIS — E66.01 MORBID OBESITY WITH BMI OF 40.0-44.9, ADULT: ICD-10-CM

## 2025-03-27 DIAGNOSIS — G47.33 OSA TREATED WITH BIPAP: ICD-10-CM

## 2025-03-27 DIAGNOSIS — Z99.89 DEPENDENCE ON OTHER ENABLING MACHINES AND DEVICES: Primary | ICD-10-CM

## 2025-03-27 DIAGNOSIS — I10 ESSENTIAL HYPERTENSION, BENIGN: ICD-10-CM

## 2025-03-27 PROCEDURE — 3074F SYST BP LT 130 MM HG: CPT | Performed by: PSYCHIATRY & NEUROLOGY

## 2025-03-27 PROCEDURE — 1159F MED LIST DOCD IN RCRD: CPT | Performed by: PSYCHIATRY & NEUROLOGY

## 2025-03-27 PROCEDURE — 1124F ACP DISCUSS-NO DSCNMKR DOCD: CPT | Performed by: PSYCHIATRY & NEUROLOGY

## 2025-03-27 PROCEDURE — 3078F DIAST BP <80 MM HG: CPT | Performed by: PSYCHIATRY & NEUROLOGY

## 2025-03-27 PROCEDURE — G2211 COMPLEX E/M VISIT ADD ON: HCPCS | Performed by: PSYCHIATRY & NEUROLOGY

## 2025-03-27 PROCEDURE — 99214 OFFICE O/P EST MOD 30 MIN: CPT | Performed by: PSYCHIATRY & NEUROLOGY

## 2025-03-27 NOTE — PROGRESS NOTES
Ruslan HINKLE Krissyjahharry         : 1945  [] [facility]     [x] [facility]      [] [facility]     [facility]    [] [facility]  [] [facility]   [] [facility])  [] [facility] [] [employer] [] [employer] [] [facility] [] [employer] [] [facility]  Diagnosis: [x] RENETTA (G47.33) [] CSA (G47.31) [] Apnea (G47.30)   Length of Need: [] 12 Months [x] 99 Months [] Other:    Machine (DENYS!):  [x] ResMed AirSense     Auto [] Other:       Humidifier: [x] Heated ()        [x] Water chamber replacement ()/ 1 per 6 months        Mask:  Please always start with the mask the patient used during the titraion   [x] Nasal () /1 per 3 months    [x] Patient choice -Size and fit mask    [x] Dispense: N30 I airtough    [x] Headgear () / 1 per 6 months    [x] Replacement Nasal Cushion ()/2 per month             Tubing: [x] Heated ()/1 per 3 months    [] Standard ()/1 per 3 months [] Other:           Filters: [x] Non-disposable ()/1 per 6 months     [x] Ultra-Fine, Disposable ()/2 per month        Miscellaneous: [x] Chin Strap ()/ 1 per 6 months [] O2 bleed-in:       LPM   [] Oximetry on CPAP/Bilevel []  Other:          Start Order Date: 25    MEDICAL JUSTIFICATION:  I, the undersigned, certify that the above prescribed supplies are medically necessary for this patient’s wellbeing.  In my opinion, the supplies are both reasonable and necessary in reference to accepted standards of medicalpractice in treatment of this patient’s condition.    Zia Corley MD      NPI: [id number]       Order Signed Date: 25    Electronically signed by Zia Corley MD on 3/27/2025 at 9:05 AM

## 2025-03-27 NOTE — PROGRESS NOTES
exposure: Past    Smokeless tobacco: Never   Vaping Use    Vaping status: Never Used   Substance and Sexual Activity    Alcohol use: Yes     Comment: occ     Drug use: No    Sexual activity: Not Currently   Other Topics Concern    Not on file   Social History Narrative    Not on file     Social Drivers of Health     Financial Resource Strain: Low Risk  (5/15/2023)    Overall Financial Resource Strain (CARDIA)     Difficulty of Paying Living Expenses: Not hard at all   Food Insecurity: No Food Insecurity (1/20/2025)    Hunger Vital Sign     Worried About Running Out of Food in the Last Year: Never true     Ran Out of Food in the Last Year: Never true   Transportation Needs: No Transportation Needs (1/20/2025)    PRAPARE - Transportation     Lack of Transportation (Medical): No     Lack of Transportation (Non-Medical): No   Physical Activity: Inactive (6/22/2024)    Exercise Vital Sign     Days of Exercise per Week: 0 days     Minutes of Exercise per Session: 10 min   Stress: Not on file   Social Connections: Not on file   Intimate Partner Violence: Unknown (1/22/2024)    [facility]    Interpersonal Safety     Feel physically or emotionally unsafe where currently live: Not on file     Harm by anyone: Not on file     Emotionally Harmed: Not on file   Housing Stability: Low Risk  (1/20/2025)    Housing Stability Vital Sign     Unable to Pay for Housing in the Last Year: No     Number of Times Moved in the Last Year: 0     Homeless in the Last Year: No       Prior to Admission medications    Medication Sig Start Date End Date Taking? Authorizing Provider   levothyroxine (SYNTHROID) 100 MCG tablet Take 1 tablet by mouth daily 3/5/25  Yes Jhonathan Larsen MD   Apoaequorin (PREVAGEN PO) Take by mouth   Yes ProviderSanya MD   allopurinol (ZYLOPRIM) 100 MG tablet TAKE 1 TABLET DAILY 10/4/24  Yes Jhonathan Larsen MD

## 2025-07-29 ENCOUNTER — TELEPHONE (OUTPATIENT)
Dept: PULMONOLOGY | Age: 80
End: 2025-07-29

## 2025-07-29 NOTE — PROGRESS NOTES
Ruslan HINKLE Krissyjahharry         : 1945  [] [facility]     [x] [facility]      [] [facility]     [][facility]    [] [facility]  [] [facility]   [] [facility]  [] [facility] [] [employer] [] [facility] [] [facility] [] [facility] [] [facility]  Diagnosis: [x] RENETTA (G47.33) [] CSA (G47.31) [] Apnea (G47.30)   Length of Need: [] 12 Months [x] 99 Months [] Other:    Machine (DENYS!):  [x] [employer] AirSense     Auto [] Other:       Humidifier: [x] Heated ()        [x] Water chamber replacement ()/ 1 per 6 months        Mask:  Please always start with the mask the patient used during the titraion   [x] Nasal () /1 per 3 months    [x] Patient choice -Size and fit mask    [x] Dispense: N30 I airtough     [x] Headgear () / 1 per 6 months    [x] Replacement Nasal Cushion ()/2 per month             Tubing: [x] Heated ()/1 per 3 months    [] Standard ()/1 per 3 months [] Other:           Filters: [x] Non-disposable ()/1 per 6 months     [x] Ultra-Fine, Disposable ()/2 per month        Miscellaneous: [x] Chin Strap ()/ 1 per 6 months [] O2 bleed-in:       LPM   [] Oximetry on CPAP/Bilevel []  Other:          Start Order Date: 25    MEDICAL JUSTIFICATION:  I, the undersigned, certify that the above prescribed supplies are medically necessary for this patient’s wellbeing.  In my opinion, the supplies are both reasonable and necessary in reference to accepted standards of medicalpractice in treatment of this patient’s condition.    Zia Corley MD      NPI: [id number]       Order Signed Date: 25    Electronically signed by Zia Corley MD on 2025 at 9:54 AM

## (undated) DEVICE — GARMENT COMPR L FOR 23IN CALF FLOTRN

## (undated) DEVICE — COVER LT HNDL BLU PLAS

## (undated) DEVICE — SURE SET-DOUBLE BASIN-LF: Brand: MEDLINE INDUSTRIES, INC.

## (undated) DEVICE — SYRINGE TB 1ML NDL 27GA L0.5IN PLAS W/ SFTY LOK PERM NDL

## (undated) DEVICE — GARMENT,MEDLINE,DVT,INT,CALF,MED, GEN2: Brand: MEDLINE

## (undated) DEVICE — SUTURE VCRL SZ 3-0 L18IN ABSRB UD L26MM SH 1/2 CIR J864D

## (undated) DEVICE — GLOVE SURG SZ 7 CRM LTX FREE POLYISOPRENE POLYMER BEAD ANTI

## (undated) DEVICE — 40583 XL ADVANCED TRENDELENBURG POSITIONING KIT: Brand: 40583 XL ADVANCED TRENDELENBURG POSITIONING KIT

## (undated) DEVICE — TRAP POLYP ETRAP

## (undated) DEVICE — PUMP SUC IRR TBNG L10FT W/ HNDPC ASSEMB STRYKEFLOW 2

## (undated) DEVICE — SOLUTION IV 1000ML 0.9% SOD CHL PH 5 INJ USP VIAFLX PLAS

## (undated) DEVICE — STAPLER INT L75MM CUT LN L73MM STPL LN L77MM BLU B FRM 8

## (undated) DEVICE — APPLICATOR SURG XL L38CM FOR ARISTA ABSRB HEMSTAT FLEXITIP

## (undated) DEVICE — DRAPE,LAP,CHOLE,W/TROUGHS,STERILE: Brand: MEDLINE

## (undated) DEVICE — 3M™ IOBAN™ 2 ANTIMICROBIAL INCISE DRAPE 6648EZ: Brand: IOBAN™ 2

## (undated) DEVICE — SEALER/DIVIDER LAP SHFT L44CM JAW APER 11.4MM 315DEG ROT

## (undated) DEVICE — TROCAR: Brand: KII SLEEVE

## (undated) DEVICE — STAPLER INT L60MM REG TISS BLU B FRM 8 FIRING 2 ROW AUTO

## (undated) DEVICE — CONTAINER,SPECIMEN,PNEU TUBE,3OZ,OR STRL: Brand: MEDLINE

## (undated) DEVICE — [HIGH FLOW INSUFFLATOR,  DO NOT USE IF PACKAGE IS DAMAGED,  KEEP DRY,  KEEP AWAY FROM SUNLIGHT,  PROTECT FROM HEAT AND RADIOACTIVE SOURCES.]: Brand: PNEUMOSURE

## (undated) DEVICE — MARYLAND BIPOLAR FORCEPS: Brand: ENDOWRIST

## (undated) DEVICE — ELECTROSURGICAL PENCIL BUTTON SWITCH NON COATED BLADE ELECTRODE 10 FT (3 M) CORD HOLSTER: Brand: MEGADYNE

## (undated) DEVICE — PLATE ES AD W 9FT CRD 2

## (undated) DEVICE — ELECTROSURGICAL PENCIL ROCKER SWITCH NON COATED BLADE ELECTRODE 10 FT (3 M) CORD HOLSTER: Brand: MEGADYNE

## (undated) DEVICE — SOLUTION INJ LR VISIV 1000ML BG

## (undated) DEVICE — BOWL MED L 32OZ PLAS W/ MOLD GRAD EZ OPN PEEL PCH

## (undated) DEVICE — GUIDEWIRE UROLOGY L150CM DIA0.035IN ANG TIP L3CM PTFE NIT

## (undated) DEVICE — SYRINGE MED 5ML STD CLR PLAS N CTRL SLIP TIP DISP

## (undated) DEVICE — STAPLER INT L340MM 45MM STD 12 FIRING B FRM PWR + GRIPPING

## (undated) DEVICE — TROCAR: Brand: KII FIOS FIRST ENTRY

## (undated) DEVICE — SOLUTION ANTIFOG VIS SYS CLEARIFY LAPSCP

## (undated) DEVICE — GLOVE SURG SZ 7 L12IN FNGR THK87MIL WHT LTX FREE

## (undated) DEVICE — SNARES COLD OVAL 10MM THIN

## (undated) DEVICE — FORCEPS BX 240CM 2.4MM L NDL RAD JAW 4 M00513334

## (undated) DEVICE — Device

## (undated) DEVICE — MINOR SET UP PK

## (undated) DEVICE — JEWISH HOSPITAL TURNOVER KIT: Brand: MEDLINE INDUSTRIES, INC.

## (undated) DEVICE — DISCONTINUED USE 399249 CS RELOAD ECHELON WHITE 45MM

## (undated) DEVICE — SURGICAL SET UP - SURE SET: Brand: MEDLINE INDUSTRIES, INC.

## (undated) DEVICE — ENDOSCOPY KIT: Brand: MEDLINE INDUSTRIES, INC.

## (undated) DEVICE — SUTURE MCRYL SZ 4-0 L27IN ABSRB UD L19MM PS-2 1/2 CIR PRIM Y426H

## (undated) DEVICE — GLOVE SURG SZ 7 L12IN FNGR THK75MIL WHT LTX POLYMER BEAD

## (undated) DEVICE — INTENDED FOR TISSUE SEPARATION, AND OTHER PROCEDURES THAT REQUIRE A SHARP SURGICAL BLADE TO PUNCTURE OR CUT.: Brand: BARD-PARKER ® CARBON RIB-BACK BLADES

## (undated) DEVICE — GAUZE,PACKING STRIP,PLAIN,1/2"X5YD,STRL: Brand: CURAD

## (undated) DEVICE — SPONGE,LAP,4"X18",XR,ST,5/PK,40PK/CS: Brand: MEDLINE INDUSTRIES, INC.

## (undated) DEVICE — SPONGE,LAP,18"X18",DLX,XR,ST,5/PK,40/PK: Brand: MEDLINE

## (undated) DEVICE — TROCAR: Brand: KII OPTICAL ACCESS SYSTEM

## (undated) DEVICE — GOWN SIRUS NONREIN XL W/TWL: Brand: MEDLINE INDUSTRIES, INC.

## (undated) DEVICE — NEEDLE HYPO 25GA L1.5IN BVL ORIENTED ECLIPSE

## (undated) DEVICE — DRAPE,T,LAPARO,TRANS,STERILE: Brand: MEDLINE

## (undated) DEVICE — Z DUP USE 2257490 ADHESIVE SKIN CLSRE 036ML TPCL 2CTL CNCRLTE HIGH VSCSTY DRMB

## (undated) DEVICE — MEDICINE CUP, GRADUATED, STER: Brand: MEDLINE

## (undated) DEVICE — MERCY HEALTH WEST TURNOVER: Brand: MEDLINE INDUSTRIES, INC.

## (undated) DEVICE — GLOVE SURG SZ 7 L12IN FNGR THK87MIL DK GRN LTX POLYMER W

## (undated) DEVICE — CHLORAPREP 26ML ORANGE

## (undated) DEVICE — DRAPE,UTILITY,TAPE,15X26,STERILE: Brand: MEDLINE

## (undated) DEVICE — TOWEL,STOP FLAG GOLD N-W: Brand: MEDLINE

## (undated) DEVICE — CADIERE FORCEPS: Brand: ENDOWRIST

## (undated) DEVICE — APPLICATOR MEDICATED 26 CC SOLUTION HI LT ORNG CHLORAPREP

## (undated) DEVICE — LAPAROSCOPIC ACCESS SYSTEM: Brand: ALEXIS LAPAROSCOPIC SYSTEM WITH KII FIOS FIRST ENTRY

## (undated) DEVICE — FORMALIN CLEAR VIAL 20 ML 10%

## (undated) DEVICE — ADHESIVE SKIN CLSR 0.7ML TOP DERMBND ADV

## (undated) DEVICE — GOWN,SIRUS,POLYRNF,BRTHSLV,LG,30/CS: Brand: MEDLINE

## (undated) DEVICE — DRAPE C ARM UNIV W41XL74IN CLR PLAS XR VELC CLSR POLY STRP

## (undated) DEVICE — SUTURE BOOT: Brand: DEROYAL

## (undated) DEVICE — SUTURE VCRL SZ 0 L27IN ABSRB UD L36MM CT-1 1/2 CIR J260H

## (undated) DEVICE — STANDARD HYPODERMIC NEEDLE,POLYPROPYLENE HUB: Brand: MONOJECT

## (undated) DEVICE — RELOAD STPL L75MM OPN H3.8MM CLS 1.5MM WIRE DIA0.2MM REG

## (undated) DEVICE — SYSTEM SMK EVAC LAP TBNG FILTER HSNG BENT STYL PNK SEE CLR

## (undated) DEVICE — DECANTER BAG 9": Brand: MEDLINE INDUSTRIES, INC.

## (undated) DEVICE — SYRINGE MED 10ML LUERLOCK TIP W/O SFTY DISP

## (undated) DEVICE — ELECTRODE PT RET AD L9FT HI MOIST COND ADH HYDRGEL CORDED

## (undated) DEVICE — TRAY CATHETER 16FR F INCLUDE BARDX IC COMPLT CARE DRNGE BG

## (undated) DEVICE — AGENT HEMSTAT 3GM PURIFIED PLNT STARCH PWD ABSRB ARISTA AH

## (undated) DEVICE — E-Z CLEAN, NON-STICK, PTFE COATED, ELECTROSURGICAL BLADE ELECTRODE, MODIFIED EXTENDED INSULATION, 2.5 INCH (6.35 CM): Brand: MEGADYNE

## (undated) DEVICE — INTENDED FOR TISSUE SEPARATION, AND OTHER PROCEDURES THAT REQUIRE A SHARP SURGICAL BLADE TO PUNCTURE OR CUT.: Brand: BARD-PARKER ® STAINLESS STEEL BLADES

## (undated) DEVICE — 3M™ STERI-DRAPE™ INCISE DRAPE 1050 (60CM X 45CM): Brand: STERI-DRAPE™